# Patient Record
Sex: FEMALE | Race: WHITE | NOT HISPANIC OR LATINO | Employment: OTHER | URBAN - METROPOLITAN AREA
[De-identification: names, ages, dates, MRNs, and addresses within clinical notes are randomized per-mention and may not be internally consistent; named-entity substitution may affect disease eponyms.]

---

## 2017-07-20 ENCOUNTER — APPOINTMENT (OUTPATIENT)
Dept: GENERAL RADIOLOGY | Facility: HOSPITAL | Age: 82
End: 2017-07-20

## 2017-07-20 ENCOUNTER — HOSPITAL ENCOUNTER (EMERGENCY)
Facility: HOSPITAL | Age: 82
Discharge: HOME | End: 2017-07-20
Attending: EMERGENCY MEDICINE | Admitting: EMERGENCY MEDICINE

## 2017-07-20 ENCOUNTER — APPOINTMENT (OUTPATIENT)
Dept: CT IMAGING | Facility: HOSPITAL | Age: 82
End: 2017-07-20

## 2017-07-20 VITALS
SYSTOLIC BLOOD PRESSURE: 152 MMHG | HEIGHT: 65 IN | HEART RATE: 84 BPM | RESPIRATION RATE: 16 BRPM | BODY MASS INDEX: 16.33 KG/M2 | DIASTOLIC BLOOD PRESSURE: 98 MMHG | TEMPERATURE: 98.1 F | OXYGEN SATURATION: 94 % | WEIGHT: 98 LBS

## 2017-07-20 DIAGNOSIS — R07.9 CHEST PAIN, UNSPECIFIED TYPE: Primary | ICD-10-CM

## 2017-07-20 LAB
ALBUMIN SERPL-MCNC: 4.4 G/DL (ref 3.2–4.8)
ALBUMIN/GLOB SERPL: 1.4 G/DL (ref 1.5–2.5)
ALP SERPL-CCNC: 93 U/L (ref 25–100)
ALT SERPL W P-5'-P-CCNC: 10 U/L (ref 7–40)
ANION GAP SERPL CALCULATED.3IONS-SCNC: 7 MMOL/L (ref 3–11)
AST SERPL-CCNC: 19 U/L (ref 0–33)
BASOPHILS # BLD AUTO: 0.02 10*3/MM3 (ref 0–0.2)
BASOPHILS NFR BLD AUTO: 0.3 % (ref 0–1)
BILIRUB SERPL-MCNC: 0.8 MG/DL (ref 0.3–1.2)
BILIRUB UR QL STRIP: NEGATIVE
BUN BLD-MCNC: 16 MG/DL (ref 9–23)
BUN/CREAT SERPL: 22.9 (ref 7–25)
CALCIUM SPEC-SCNC: 10 MG/DL (ref 8.7–10.4)
CHLORIDE SERPL-SCNC: 101 MMOL/L (ref 99–109)
CLARITY UR: CLEAR
CO2 SERPL-SCNC: 29 MMOL/L (ref 20–31)
COLOR UR: YELLOW
CREAT BLD-MCNC: 0.7 MG/DL (ref 0.6–1.3)
D DIMER PPP FEU-MCNC: 1.1 MG/L (FEU) (ref 0–0.5)
DEPRECATED RDW RBC AUTO: 47.7 FL (ref 37–54)
EOSINOPHIL # BLD AUTO: 0.07 10*3/MM3 (ref 0–0.3)
EOSINOPHIL NFR BLD AUTO: 0.9 % (ref 0–3)
ERYTHROCYTE [DISTWIDTH] IN BLOOD BY AUTOMATED COUNT: 13.4 % (ref 11.3–14.5)
GFR SERPL CREATININE-BSD FRML MDRD: 79 ML/MIN/1.73
GLOBULIN UR ELPH-MCNC: 3.1 GM/DL
GLUCOSE BLD-MCNC: 106 MG/DL (ref 70–100)
GLUCOSE UR STRIP-MCNC: NEGATIVE MG/DL
HCT VFR BLD AUTO: 44.4 % (ref 34.5–44)
HGB BLD-MCNC: 14.5 G/DL (ref 11.5–15.5)
HGB UR QL STRIP.AUTO: NEGATIVE
HOLD SPECIMEN: NORMAL
HOLD SPECIMEN: NORMAL
IMM GRANULOCYTES # BLD: 0.02 10*3/MM3 (ref 0–0.03)
IMM GRANULOCYTES NFR BLD: 0.3 % (ref 0–0.6)
INR PPP: 0.96
KETONES UR QL STRIP: ABNORMAL
LEUKOCYTE ESTERASE UR QL STRIP.AUTO: NEGATIVE
LYMPHOCYTES # BLD AUTO: 1.04 10*3/MM3 (ref 0.6–4.8)
LYMPHOCYTES NFR BLD AUTO: 13.3 % (ref 24–44)
MCH RBC QN AUTO: 31.7 PG (ref 27–31)
MCHC RBC AUTO-ENTMCNC: 32.7 G/DL (ref 32–36)
MCV RBC AUTO: 96.9 FL (ref 80–99)
MONOCYTES # BLD AUTO: 0.37 10*3/MM3 (ref 0–1)
MONOCYTES NFR BLD AUTO: 4.7 % (ref 0–12)
NEUTROPHILS # BLD AUTO: 6.29 10*3/MM3 (ref 1.5–8.3)
NEUTROPHILS NFR BLD AUTO: 80.5 % (ref 41–71)
NITRITE UR QL STRIP: NEGATIVE
PH UR STRIP.AUTO: 7 [PH] (ref 5–8)
PLATELET # BLD AUTO: 208 10*3/MM3 (ref 150–450)
PMV BLD AUTO: 9.3 FL (ref 6–12)
POTASSIUM BLD-SCNC: 3.9 MMOL/L (ref 3.5–5.5)
PROT SERPL-MCNC: 7.5 G/DL (ref 5.7–8.2)
PROT UR QL STRIP: ABNORMAL
PROTHROMBIN TIME: 10.4 SECONDS (ref 9.6–11.5)
RBC # BLD AUTO: 4.58 10*6/MM3 (ref 3.89–5.14)
SODIUM BLD-SCNC: 137 MMOL/L (ref 132–146)
SP GR UR STRIP: 1.01 (ref 1–1.03)
TROPONIN I SERPL-MCNC: 0 NG/ML (ref 0–0.07)
TROPONIN I SERPL-MCNC: 0 NG/ML (ref 0–0.07)
UROBILINOGEN UR QL STRIP: ABNORMAL
WBC NRBC COR # BLD: 7.81 10*3/MM3 (ref 3.5–10.8)
WHOLE BLOOD HOLD SPECIMEN: NORMAL
WHOLE BLOOD HOLD SPECIMEN: NORMAL

## 2017-07-20 PROCEDURE — 0 IOPAMIDOL PER 1 ML: Performed by: EMERGENCY MEDICINE

## 2017-07-20 PROCEDURE — 80053 COMPREHEN METABOLIC PANEL: CPT | Performed by: EMERGENCY MEDICINE

## 2017-07-20 PROCEDURE — 81003 URINALYSIS AUTO W/O SCOPE: CPT | Performed by: EMERGENCY MEDICINE

## 2017-07-20 PROCEDURE — 93005 ELECTROCARDIOGRAM TRACING: CPT | Performed by: EMERGENCY MEDICINE

## 2017-07-20 PROCEDURE — 71010 HC CHEST PA OR AP: CPT

## 2017-07-20 PROCEDURE — 25010000002 HYDROMORPHONE PER 4 MG: Performed by: EMERGENCY MEDICINE

## 2017-07-20 PROCEDURE — 96375 TX/PRO/DX INJ NEW DRUG ADDON: CPT

## 2017-07-20 PROCEDURE — 25010000002 ONDANSETRON PER 1 MG: Performed by: EMERGENCY MEDICINE

## 2017-07-20 PROCEDURE — 25010000002 HYDROMORPHONE PER 4 MG

## 2017-07-20 PROCEDURE — 85379 FIBRIN DEGRADATION QUANT: CPT | Performed by: EMERGENCY MEDICINE

## 2017-07-20 PROCEDURE — 85610 PROTHROMBIN TIME: CPT | Performed by: EMERGENCY MEDICINE

## 2017-07-20 PROCEDURE — 96376 TX/PRO/DX INJ SAME DRUG ADON: CPT

## 2017-07-20 PROCEDURE — 71275 CT ANGIOGRAPHY CHEST: CPT

## 2017-07-20 PROCEDURE — 74176 CT ABD & PELVIS W/O CONTRAST: CPT

## 2017-07-20 PROCEDURE — 99285 EMERGENCY DEPT VISIT HI MDM: CPT

## 2017-07-20 PROCEDURE — 96374 THER/PROPH/DIAG INJ IV PUSH: CPT

## 2017-07-20 PROCEDURE — 84484 ASSAY OF TROPONIN QUANT: CPT

## 2017-07-20 PROCEDURE — 85025 COMPLETE CBC W/AUTO DIFF WBC: CPT | Performed by: EMERGENCY MEDICINE

## 2017-07-20 RX ORDER — TRAMADOL HYDROCHLORIDE 50 MG/1
50 TABLET ORAL 2 TIMES DAILY
Status: ON HOLD | COMMUNITY
End: 2019-08-27 | Stop reason: SDUPTHER

## 2017-07-20 RX ORDER — ONDANSETRON 2 MG/ML
4 INJECTION INTRAMUSCULAR; INTRAVENOUS ONCE
Status: DISCONTINUED | OUTPATIENT
Start: 2017-07-20 | End: 2017-07-20

## 2017-07-20 RX ORDER — TEMAZEPAM 15 MG/1
15 CAPSULE ORAL NIGHTLY PRN
Status: ON HOLD | COMMUNITY
End: 2019-08-27 | Stop reason: SDUPTHER

## 2017-07-20 RX ORDER — HYDROMORPHONE HYDROCHLORIDE 1 MG/ML
0.25 INJECTION, SOLUTION INTRAMUSCULAR; INTRAVENOUS; SUBCUTANEOUS ONCE
Status: COMPLETED | OUTPATIENT
Start: 2017-07-20 | End: 2017-07-20

## 2017-07-20 RX ORDER — ONDANSETRON 2 MG/ML
4 INJECTION INTRAMUSCULAR; INTRAVENOUS ONCE
Status: COMPLETED | OUTPATIENT
Start: 2017-07-20 | End: 2017-07-20

## 2017-07-20 RX ORDER — SODIUM CHLORIDE 0.9 % (FLUSH) 0.9 %
10 SYRINGE (ML) INJECTION AS NEEDED
Status: DISCONTINUED | OUTPATIENT
Start: 2017-07-20 | End: 2017-07-20 | Stop reason: HOSPADM

## 2017-07-20 RX ORDER — CYCLOBENZAPRINE HCL 10 MG
10 TABLET ORAL 3 TIMES DAILY PRN
Qty: 15 TABLET | Refills: 0 | Status: SHIPPED | OUTPATIENT
Start: 2017-07-20 | End: 2019-08-23

## 2017-07-20 RX ORDER — ASPIRIN 81 MG/1
324 TABLET, CHEWABLE ORAL ONCE
Status: COMPLETED | OUTPATIENT
Start: 2017-07-20 | End: 2017-07-20

## 2017-07-20 RX ORDER — MELOXICAM 7.5 MG/1
7.5 TABLET ORAL DAILY
COMMUNITY
End: 2019-08-23

## 2017-07-20 RX ORDER — LEVOTHYROXINE SODIUM 0.03 MG/1
25 TABLET ORAL DAILY
COMMUNITY
End: 2019-08-27 | Stop reason: HOSPADM

## 2017-07-20 RX ADMIN — HYDROMORPHONE HYDROCHLORIDE 0.25 MG: 1 INJECTION, SOLUTION INTRAMUSCULAR; INTRAVENOUS; SUBCUTANEOUS at 15:18

## 2017-07-20 RX ADMIN — ONDANSETRON 4 MG: 2 INJECTION INTRAMUSCULAR; INTRAVENOUS at 15:17

## 2017-07-20 RX ADMIN — ONDANSETRON 4 MG: 2 INJECTION INTRAMUSCULAR; INTRAVENOUS at 12:00

## 2017-07-20 RX ADMIN — IOPAMIDOL 65 ML: 755 INJECTION, SOLUTION INTRAVENOUS at 10:28

## 2017-07-20 RX ADMIN — HYDROMORPHONE HYDROCHLORIDE 0.25 MG: 1 INJECTION, SOLUTION INTRAMUSCULAR; INTRAVENOUS; SUBCUTANEOUS at 12:01

## 2017-07-20 RX ADMIN — ASPIRIN 81 MG CHEWABLE TABLET 324 MG: 81 TABLET CHEWABLE at 09:01

## 2017-07-20 NOTE — DISCHARGE INSTRUCTIONS
You may use 500 mg of Tylenol every 8 hours with your Flexeril as needed.  Do not use her temazepam at night for sleep if your drowsy.  Return if shortness of air, pain worsens, or problems sooner.

## 2017-07-20 NOTE — ED PROVIDER NOTES
Subjective   HPI Comments: 87-year-old white female complaining of left lower rib pain for the past 24 hours.  Patient states that it hurts if she takes a deep breath then and to some extent when she moves.  She denies any trauma, shortness of breath, vomiting, abdominal pain, or other complaints.    Patient is a 87 y.o. female presenting with chest pain.   History provided by:  Patient  Chest Pain   Pain location:  L chest  Pain quality: dull    Pain radiates to:  Does not radiate  Onset quality:  Gradual  Duration:  1 day  Timing:  Constant  Chronicity:  New  Context: breathing    Relieved by:  Nothing  Worsened by:  Movement and deep breathing  Ineffective treatments:  None tried  Associated symptoms: no abdominal pain, no back pain, no fever, no numbness and no palpitations        Review of Systems   Constitutional: Negative for fever.   Cardiovascular: Positive for chest pain. Negative for palpitations.   Gastrointestinal: Negative for abdominal pain.   Musculoskeletal: Negative for back pain.   Neurological: Negative for numbness.   All other systems reviewed and are negative.      Past Medical History:   Diagnosis Date   • Disease of thyroid gland    • TIA (transient ischemic attack)        Allergies   Allergen Reactions   • Sulfa Antibiotics Rash       Past Surgical History:   Procedure Laterality Date   • ANKLE SURGERY     • FRACTURE SURGERY     • HERNIA REPAIR         History reviewed. No pertinent family history.    Social History     Social History   • Marital status:      Spouse name: N/A   • Number of children: N/A   • Years of education: N/A     Social History Main Topics   • Smoking status: Never Smoker   • Smokeless tobacco: None   • Alcohol use No   • Drug use: No   • Sexual activity: Defer     Other Topics Concern   • None     Social History Narrative   • None           Objective   Physical Exam   Constitutional: She appears well-developed and well-nourished.   HENT:   Head: Normocephalic  and atraumatic.   Eyes: Conjunctivae are normal. Pupils are equal, round, and reactive to light.   Cardiovascular: Normal rate, regular rhythm and normal heart sounds.    No murmur heard.  Pulmonary/Chest: Effort normal and breath sounds normal. No respiratory distress. She has no wheezes.   Abdominal: Soft. Bowel sounds are normal. She exhibits no distension. There is no tenderness. There is no rebound and no guarding.   Musculoskeletal: Normal range of motion. She exhibits no edema.   Neurological: She is alert.   Skin: Skin is warm and dry. No rash noted.   Psychiatric: Her behavior is normal. Judgment and thought content normal.   Nursing note and vitals reviewed.      Procedures         ED Course  ED Course   Comment By Time   She with no other complaints concerns. JUDIE Levin 07/20 1422          Recent Results (from the past 24 hour(s))   Comprehensive Metabolic Panel    Collection Time: 07/20/17  9:02 AM   Result Value Ref Range    Glucose 106 (H) 70 - 100 mg/dL    BUN 16 9 - 23 mg/dL    Creatinine 0.70 0.60 - 1.30 mg/dL    Sodium 137 132 - 146 mmol/L    Potassium 3.9 3.5 - 5.5 mmol/L    Chloride 101 99 - 109 mmol/L    CO2 29.0 20.0 - 31.0 mmol/L    Calcium 10.0 8.7 - 10.4 mg/dL    Total Protein 7.5 5.7 - 8.2 g/dL    Albumin 4.40 3.20 - 4.80 g/dL    ALT (SGPT) 10 7 - 40 U/L    AST (SGOT) 19 0 - 33 U/L    Alkaline Phosphatase 93 25 - 100 U/L    Total Bilirubin 0.8 0.3 - 1.2 mg/dL    eGFR Non African Amer 79 >60 mL/min/1.73    Globulin 3.1 gm/dL    A/G Ratio 1.4 (L) 1.5 - 2.5 g/dL    BUN/Creatinine Ratio 22.9 7.0 - 25.0    Anion Gap 7.0 3.0 - 11.0 mmol/L   Protime-INR    Collection Time: 07/20/17  9:02 AM   Result Value Ref Range    Protime 10.4 9.6 - 11.5 Seconds    INR 0.96    D-dimer, Quantitative    Collection Time: 07/20/17  9:02 AM   Result Value Ref Range    D-Dimer, Quantitative 1.10 (H) 0.00 - 0.50 mg/L (FEU)   Light Blue Top    Collection Time: 07/20/17  9:02 AM   Result Value Ref Range    Extra  Tube hold for add-on    Green Top (Gel)    Collection Time: 07/20/17  9:02 AM   Result Value Ref Range    Extra Tube Hold for add-ons.    Lavender Top    Collection Time: 07/20/17  9:02 AM   Result Value Ref Range    Extra Tube hold for add-on    Gold Top - SST    Collection Time: 07/20/17  9:02 AM   Result Value Ref Range    Extra Tube Hold for add-ons.    CBC Auto Differential    Collection Time: 07/20/17  9:02 AM   Result Value Ref Range    WBC 7.81 3.50 - 10.80 10*3/mm3    RBC 4.58 3.89 - 5.14 10*6/mm3    Hemoglobin 14.5 11.5 - 15.5 g/dL    Hematocrit 44.4 (H) 34.5 - 44.0 %    MCV 96.9 80.0 - 99.0 fL    MCH 31.7 (H) 27.0 - 31.0 pg    MCHC 32.7 32.0 - 36.0 g/dL    RDW 13.4 11.3 - 14.5 %    RDW-SD 47.7 37.0 - 54.0 fl    MPV 9.3 6.0 - 12.0 fL    Platelets 208 150 - 450 10*3/mm3    Neutrophil % 80.5 (H) 41.0 - 71.0 %    Lymphocyte % 13.3 (L) 24.0 - 44.0 %    Monocyte % 4.7 0.0 - 12.0 %    Eosinophil % 0.9 0.0 - 3.0 %    Basophil % 0.3 0.0 - 1.0 %    Immature Grans % 0.3 0.0 - 0.6 %    Neutrophils, Absolute 6.29 1.50 - 8.30 10*3/mm3    Lymphocytes, Absolute 1.04 0.60 - 4.80 10*3/mm3    Monocytes, Absolute 0.37 0.00 - 1.00 10*3/mm3    Eosinophils, Absolute 0.07 0.00 - 0.30 10*3/mm3    Basophils, Absolute 0.02 0.00 - 0.20 10*3/mm3    Immature Grans, Absolute 0.02 0.00 - 0.03 10*3/mm3   POC Troponin, Rapid    Collection Time: 07/20/17  9:04 AM   Result Value Ref Range    Troponin I 0.00 0.00 - 0.07 ng/mL   Urinalysis With / Culture If Indicated    Collection Time: 07/20/17  9:41 AM   Result Value Ref Range    Color, UA Yellow Yellow, Straw    Appearance, UA Clear Clear    pH, UA 7.0 5.0 - 8.0    Specific Gravity, UA 1.014 1.001 - 1.030    Glucose, UA Negative Negative    Ketones, UA 15 mg/dL (1+) (A) Negative    Bilirubin, UA Negative Negative    Blood, UA Negative Negative    Protein, UA Trace (A) Negative    Leuk Esterase, UA Negative Negative    Nitrite, UA Negative Negative    Urobilinogen, UA 0.2 E.U./dL 0.2 - 1.0  E.U./dL   POC Troponin, Rapid    Collection Time: 07/20/17 12:17 PM   Result Value Ref Range    Troponin I 0.00 0.00 - 0.07 ng/mL     Note: In addition to lab results from this visit, the labs listed above may include labs taken at another facility or during a different encounter within the last 24 hours. Please correlate lab times with ED admission and discharge times for further clarification of the services performed during this visit.    CT Abdomen Pelvis Without Contrast   Preliminary Result   There are no acute findings.       D:  07/20/2017   E:  07/20/2017              XR Chest 1 View   Final Result   Chronic change; no acute disease.       D:  07/20/2017   E:  07/20/2017       This report was finalized on 7/20/2017 11:45 AM by Dr. Lester Patterson MD.          CT Angiogram Chest With & Without Contrast   Final Result   Negative CT angiogram of the chest.       D:  07/20/2017   E:  07/20/2017           This report was finalized on 7/20/2017 11:44 AM by Dr. Lester Patterson MD.            Vitals:    07/20/17 1200 07/20/17 1300 07/20/17 1307 07/20/17 1423   BP: (!) 169/109 165/100  152/98   BP Location:    Right arm   Patient Position:    Lying   Pulse: 103  82 84   Resp:       Temp:       SpO2: 96% 94% 95% 94%   Weight:       Height:         Medications   sodium chloride 0.9 % flush 10 mL (not administered)   aspirin chewable tablet 324 mg (324 mg Oral Given 7/20/17 0901)   iopamidol (ISOVUE-370) 76 % injection 100 mL (65 mL Intravenous Given 7/20/17 1028)   HYDROmorphone (DILAUDID) injection 0.25 mg (0.25 mg Intravenous Given 7/20/17 1201)   ondansetron (ZOFRAN) injection 4 mg (4 mg Intravenous Given 7/20/17 1200)     ECG/EMG Results (last 24 hours)     Procedure Component Value Units Date/Time    ECG 12 Lead [[id number]] Collected:  07/20/17 0839     Updated:  07/20/17 1241    Narrative:       Test Reason : upper abd pain  Blood Pressure : **/** mmHG  Vent. 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

## 2019-08-23 ENCOUNTER — HOSPITAL ENCOUNTER (INPATIENT)
Facility: HOSPITAL | Age: 84
LOS: 4 days | Discharge: NURSING, REHAB OR LONG-TERM CARE | End: 2019-08-27
Attending: EMERGENCY MEDICINE | Admitting: FAMILY MEDICINE

## 2019-08-23 ENCOUNTER — APPOINTMENT (OUTPATIENT)
Dept: GENERAL RADIOLOGY | Facility: HOSPITAL | Age: 84
End: 2019-08-23

## 2019-08-23 ENCOUNTER — APPOINTMENT (OUTPATIENT)
Dept: CARDIOLOGY | Facility: HOSPITAL | Age: 84
End: 2019-08-23

## 2019-08-23 ENCOUNTER — APPOINTMENT (OUTPATIENT)
Dept: CT IMAGING | Facility: HOSPITAL | Age: 84
End: 2019-08-23

## 2019-08-23 DIAGNOSIS — N39.0 URINARY TRACT INFECTION WITHOUT HEMATURIA, SITE UNSPECIFIED: ICD-10-CM

## 2019-08-23 DIAGNOSIS — Z78.9 IMPAIRED MOBILITY AND ADLS: ICD-10-CM

## 2019-08-23 DIAGNOSIS — I48.92 NEW ONSET ATRIAL FLUTTER (HCC): ICD-10-CM

## 2019-08-23 DIAGNOSIS — Z74.09 IMPAIRED MOBILITY AND ADLS: ICD-10-CM
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
## 2019-08-23 DIAGNOSIS — D72.829 LEUKOCYTOSIS, UNSPECIFIED TYPE: ICD-10-CM
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
## 2019-08-23 PROBLEM — M62.82 RHABDOMYOLYSIS: Status: ACTIVE | Noted: 2019-08-23

## 2019-08-23 PROBLEM — E03.9 HYPOTHYROIDISM (ACQUIRED): Status: ACTIVE | Noted: 2019-08-23

## 2019-08-23 LAB
ALBUMIN SERPL-MCNC: 4.7 G/DL (ref 3.5–5.2)
ALBUMIN/GLOB SERPL: 1.6 G/DL
ALP SERPL-CCNC: 82 U/L (ref 39–117)
ALT SERPL W P-5'-P-CCNC: 10 U/L (ref 1–33)
ANION GAP SERPL CALCULATED.3IONS-SCNC: 10 MMOL/L (ref 5–15)
AST SERPL-CCNC: 26 U/L (ref 1–32)
BACTERIA UR QL AUTO: ABNORMAL /HPF
BASOPHILS # BLD AUTO: 0.04 10*3/MM3 (ref 0–0.2)
BASOPHILS NFR BLD AUTO: 0.4 % (ref 0–1.5)
BH CV ECHO MEAS - AO ROOT AREA (BSA CORRECTED): 2.1
BH CV ECHO MEAS - AO ROOT AREA: 8 CM^2
BH CV ECHO MEAS - AO ROOT DIAM: 3.2 CM
BH CV ECHO MEAS - BSA(HAYCOCK): 1.5 M^2
BH CV ECHO MEAS - BSA: 1.5 M^2
BH CV ECHO MEAS - BZI_BMI: 20 KILOGRAMS/M^2
BH CV ECHO MEAS - BZI_METRIC_HEIGHT: 160 CM
BH CV ECHO MEAS - BZI_METRIC_WEIGHT: 51.3 KG
BH CV ECHO MEAS - EDV(CUBED): 38.3 ML
BH CV ECHO MEAS - EDV(TEICH): 46.5 ML
BH CV ECHO MEAS - EF(CUBED): 77.9 %
BH CV ECHO MEAS - EF(TEICH): 71.3 %
BH CV ECHO MEAS - ESV(CUBED): 8.5 ML
BH CV ECHO MEAS - ESV(TEICH): 13.3 ML
BH CV ECHO MEAS - FS: 39.6 %
BH CV ECHO MEAS - IVS/LVPW: 1
BH CV ECHO MEAS - IVSD: 1.1 CM
BH CV ECHO MEAS - LA DIMENSION: 2.6 CM
BH CV ECHO MEAS - LA/AO: 0.83
BH CV ECHO MEAS - LAD MAJOR: 5.2 CM
BH CV ECHO MEAS - LAT PEAK E' VEL: 5.8 CM/SEC
BH CV ECHO MEAS - LATERAL E/E' RATIO: 7.8
BH CV ECHO MEAS - LV MASS(C)D: 109.2 GRAMS
BH CV ECHO MEAS - LV MASS(C)DI: 72 GRAMS/M^2
BH CV ECHO MEAS - LVIDD: 3.4 CM
BH CV ECHO MEAS - LVIDS: 2 CM
BH CV ECHO MEAS - LVPWD: 1.1 CM
BH CV ECHO MEAS - MED PEAK E' VEL: 6.1 CM/SEC
BH CV ECHO MEAS - MEDIAL E/E' RATIO: 7.3
BH CV ECHO MEAS - MV A MAX VEL: 85.4 CM/SEC
BH CV ECHO MEAS - MV DEC SLOPE: 140.6 CM/SEC^2
BH CV ECHO MEAS - MV DEC TIME: 0.37 SEC
BH CV ECHO MEAS - MV E MAX VEL: 46.4 CM/SEC
BH CV ECHO MEAS - MV E/A: 0.54
BH CV ECHO MEAS - MV P1/2T MAX VEL: 57 CM/SEC
BH CV ECHO MEAS - MV P1/2T: 118.8 MSEC
BH CV ECHO MEAS - MVA P1/2T LCG: 3.9 CM^2
BH CV ECHO MEAS - MVA(P1/2T): 1.9 CM^2
BH CV ECHO MEAS - PA ACC SLOPE: 266.1 CM/SEC^2
BH CV ECHO MEAS - PA ACC TIME: 0.17 SEC
BH CV ECHO MEAS - PA PR(ACCEL): 1.4 MMHG
BH CV ECHO MEAS - RAP SYSTOLE: 3 MMHG
BH CV ECHO MEAS - RV MAX PG: 0.26 MMHG
BH CV ECHO MEAS - RV V1 MAX: 25.7 CM/SEC
BH CV ECHO MEAS - RVSP: 15 MMHG
BH CV ECHO MEAS - SI(CUBED): 19.7 ML/M^2
BH CV ECHO MEAS - SI(TEICH): 21.8 ML/M^2
BH CV ECHO MEAS - SV(CUBED): 29.9 ML
BH CV ECHO MEAS - SV(TEICH): 33.1 ML
BH CV ECHO MEAS - TR MAX PG: 12 MMHG
BH CV ECHO MEAS - TR MAX VEL: 175.1 CM/SEC
BH CV ECHO MEASUREMENTS AVERAGE E/E' RATIO: 7.8
BILIRUB SERPL-MCNC: 0.8 MG/DL (ref 0.2–1.2)
BILIRUB UR QL STRIP: NEGATIVE
BUN BLD-MCNC: 17 MG/DL (ref 8–23)
BUN/CREAT SERPL: 22.4 (ref 7–25)
CALCIUM SPEC-SCNC: 9.6 MG/DL (ref 8.6–10.5)
CHLORIDE SERPL-SCNC: 102 MMOL/L (ref 98–107)
CK SERPL-CCNC: 251 U/L (ref 20–180)
CLARITY UR: ABNORMAL
CO2 SERPL-SCNC: 28 MMOL/L (ref 22–29)
COLOR UR: YELLOW
CREAT BLD-MCNC: 0.76 MG/DL (ref 0.57–1)
DEPRECATED RDW RBC AUTO: 47.1 FL (ref 37–54)
EOSINOPHIL # BLD AUTO: 0 10*3/MM3 (ref 0–0.4)
EOSINOPHIL NFR BLD AUTO: 0 % (ref 0.3–6.2)
ERYTHROCYTE [DISTWIDTH] IN BLOOD BY AUTOMATED COUNT: 13.8 % (ref 12.3–15.4)
GFR SERPL CREATININE-BSD FRML MDRD: 72 ML/MIN/1.73
GLOBULIN UR ELPH-MCNC: 2.9 GM/DL
GLUCOSE BLD-MCNC: 122 MG/DL (ref 65–99)
GLUCOSE UR STRIP-MCNC: NEGATIVE MG/DL
HCT VFR BLD AUTO: 46.1 % (ref 34–46.6)
HGB BLD-MCNC: 14.7 G/DL (ref 12–15.9)
HGB UR QL STRIP.AUTO: ABNORMAL
HOLD SPECIMEN: NORMAL
HOLD SPECIMEN: NORMAL
HYALINE CASTS UR QL AUTO: ABNORMAL /LPF
IMM GRANULOCYTES # BLD AUTO: 0.06 10*3/MM3 (ref 0–0.05)
IMM GRANULOCYTES NFR BLD AUTO: 0.5 % (ref 0–0.5)
KETONES UR QL STRIP: NEGATIVE
LEFT ATRIUM VOLUME INDEX: 17.1 ML/M^2
LEFT ATRIUM VOLUME: 26 ML
LEUKOCYTE ESTERASE UR QL STRIP.AUTO: ABNORMAL
LYMPHOCYTES # BLD AUTO: 0.87 10*3/MM3 (ref 0.7–3.1)
LYMPHOCYTES NFR BLD AUTO: 7.9 % (ref 19.6–45.3)
MAGNESIUM SERPL-MCNC: 2.2 MG/DL (ref 1.6–2.4)
MAXIMAL PREDICTED HEART RATE: 131 BPM
MCH RBC QN AUTO: 29.4 PG (ref 26.6–33)
MCHC RBC AUTO-ENTMCNC: 31.9 G/DL (ref 31.5–35.7)
MCV RBC AUTO: 92.2 FL (ref 79–97)
MONOCYTES # BLD AUTO: 0.39 10*3/MM3 (ref 0.1–0.9)
MONOCYTES NFR BLD AUTO: 3.5 % (ref 5–12)
NEUTROPHILS # BLD AUTO: 9.72 10*3/MM3 (ref 1.7–7)
NEUTROPHILS NFR BLD AUTO: 87.7 % (ref 42.7–76)
NITRITE UR QL STRIP: NEGATIVE
NRBC BLD AUTO-RTO: 0 /100 WBC (ref 0–0.2)
NT-PROBNP SERPL-MCNC: 378.7 PG/ML (ref 5–1800)
PH UR STRIP.AUTO: 7.5 [PH] (ref 5–8)
PLATELET # BLD AUTO: 215 10*3/MM3 (ref 140–450)
PMV BLD AUTO: 9 FL (ref 6–12)
POTASSIUM BLD-SCNC: 4.2 MMOL/L (ref 3.5–5.2)
PROT SERPL-MCNC: 7.6 G/DL (ref 6–8.5)
PROT UR QL STRIP: NEGATIVE
RBC # BLD AUTO: 5 10*6/MM3 (ref 3.77–5.28)
RBC # UR: ABNORMAL /HPF
REF LAB TEST METHOD: ABNORMAL
SODIUM BLD-SCNC: 140 MMOL/L (ref 136–145)
SP GR UR STRIP: 1.01 (ref 1–1.03)
SQUAMOUS #/AREA URNS HPF: ABNORMAL /HPF
STRESS TARGET HR: 111 BPM
TROPONIN T SERPL-MCNC: <0.01 NG/ML (ref 0–0.03)
TSH SERPL DL<=0.05 MIU/L-ACNC: 5.11 MIU/ML (ref 0.27–4.2)
UROBILINOGEN UR QL STRIP: ABNORMAL
WBC NRBC COR # BLD: 11.08 10*3/MM3 (ref 3.4–10.8)
WBC UR QL AUTO: ABNORMAL /HPF
WHOLE BLOOD HOLD SPECIMEN: NORMAL
WHOLE BLOOD HOLD SPECIMEN: NORMAL

## 2019-08-23 PROCEDURE — 87077 CULTURE AEROBIC IDENTIFY: CPT | Performed by: EMERGENCY MEDICINE

## 2019-08-23 PROCEDURE — 87186 SC STD MICRODIL/AGAR DIL: CPT | Performed by: EMERGENCY MEDICINE

## 2019-08-23 PROCEDURE — 93306 TTE W/DOPPLER COMPLETE: CPT | Performed by: INTERNAL MEDICINE

## 2019-08-23 PROCEDURE — 70450 CT HEAD/BRAIN W/O DYE: CPT

## 2019-08-23 PROCEDURE — 73522 X-RAY EXAM HIPS BI 3-4 VIEWS: CPT

## 2019-08-23 PROCEDURE — 99285 EMERGENCY DEPT VISIT HI MDM: CPT

## 2019-08-23 PROCEDURE — 81001 URINALYSIS AUTO W/SCOPE: CPT | Performed by: EMERGENCY MEDICINE

## 2019-08-23 PROCEDURE — 87086 URINE CULTURE/COLONY COUNT: CPT | Performed by: EMERGENCY MEDICINE

## 2019-08-23 PROCEDURE — 25010000002 CEFTRIAXONE PER 250 MG: Performed by: EMERGENCY MEDICINE

## 2019-08-23 PROCEDURE — 74176 CT ABD & PELVIS W/O CONTRAST: CPT

## 2019-08-23 PROCEDURE — 72125 CT NECK SPINE W/O DYE: CPT

## 2019-08-23 PROCEDURE — 84443 ASSAY THYROID STIM HORMONE: CPT

## 2019-08-23 PROCEDURE — 99223 1ST HOSP IP/OBS HIGH 75: CPT | Performed by: HOSPITALIST

## 2019-08-23 PROCEDURE — 80053 COMPREHEN METABOLIC PANEL: CPT

## 2019-08-23 PROCEDURE — 93306 TTE W/DOPPLER COMPLETE: CPT

## 2019-08-23 PROCEDURE — 85025 COMPLETE CBC W/AUTO DIFF WBC: CPT

## 2019-08-23 PROCEDURE — 82550 ASSAY OF CK (CPK): CPT | Performed by: EMERGENCY MEDICINE

## 2019-08-23 PROCEDURE — 83735 ASSAY OF MAGNESIUM: CPT

## 2019-08-23 PROCEDURE — 83880 ASSAY OF NATRIURETIC PEPTIDE: CPT

## 2019-08-23 PROCEDURE — 70486 CT MAXILLOFACIAL W/O DYE: CPT

## 2019-08-23 PROCEDURE — 71045 X-RAY EXAM CHEST 1 VIEW: CPT

## 2019-08-23 PROCEDURE — 93005 ELECTROCARDIOGRAM TRACING: CPT | Performed by: EMERGENCY MEDICINE

## 2019-08-23 PROCEDURE — 84484 ASSAY OF TROPONIN QUANT: CPT

## 2019-08-23 RX ORDER — SODIUM CHLORIDE 0.9 % (FLUSH) 0.9 %
10 SYRINGE (ML) INJECTION AS NEEDED
Status: DISCONTINUED | OUTPATIENT
Start: 2019-08-23 | End: 2019-08-27 | Stop reason: HOSPADM

## 2019-08-23 RX ORDER — METOPROLOL TARTRATE 5 MG/5ML
2.5 INJECTION INTRAVENOUS
Status: DISCONTINUED | OUTPATIENT
Start: 2019-08-23 | End: 2019-08-23

## 2019-08-23 RX ORDER — ONDANSETRON 4 MG/1
4 TABLET, FILM COATED ORAL EVERY 6 HOURS PRN
Status: DISCONTINUED | OUTPATIENT
Start: 2019-08-23 | End: 2019-08-27 | Stop reason: HOSPADM

## 2019-08-23 RX ORDER — HYDROMORPHONE HYDROCHLORIDE 1 MG/ML
0.5 INJECTION, SOLUTION INTRAMUSCULAR; INTRAVENOUS; SUBCUTANEOUS
Status: DISCONTINUED | OUTPATIENT
Start: 2019-08-23 | End: 2019-08-27 | Stop reason: HOSPADM

## 2019-08-23 RX ORDER — SODIUM CHLORIDE 9 MG/ML
125 INJECTION, SOLUTION INTRAVENOUS CONTINUOUS
Status: DISCONTINUED | OUTPATIENT
Start: 2019-08-23 | End: 2019-08-23 | Stop reason: SDUPTHER

## 2019-08-23 RX ORDER — METOPROLOL TARTRATE 5 MG/5ML
5 INJECTION INTRAVENOUS EVERY 4 HOURS PRN
Status: DISCONTINUED | OUTPATIENT
Start: 2019-08-23 | End: 2019-08-27 | Stop reason: HOSPADM

## 2019-08-23 RX ORDER — SODIUM CHLORIDE, SODIUM LACTATE, POTASSIUM CHLORIDE, CALCIUM CHLORIDE 600; 310; 30; 20 MG/100ML; MG/100ML; MG/100ML; MG/100ML
100 INJECTION, SOLUTION INTRAVENOUS CONTINUOUS
Status: DISCONTINUED | OUTPATIENT
Start: 2019-08-23 | End: 2019-08-24

## 2019-08-23 RX ORDER — SACCHAROMYCES BOULARDII 250 MG
250 CAPSULE ORAL 2 TIMES DAILY
Status: DISCONTINUED | OUTPATIENT
Start: 2019-08-23 | End: 2019-08-27 | Stop reason: HOSPADM

## 2019-08-23 RX ORDER — TRAMADOL HYDROCHLORIDE 50 MG/1
50 TABLET ORAL EVERY 12 HOURS PRN
Status: DISCONTINUED | OUTPATIENT
Start: 2019-08-23 | End: 2019-08-27 | Stop reason: HOSPADM

## 2019-08-23 RX ORDER — LEVOTHYROXINE SODIUM 0.07 MG/1
37 TABLET ORAL
Status: DISCONTINUED | OUTPATIENT
Start: 2019-08-23 | End: 2019-08-27 | Stop reason: HOSPADM

## 2019-08-23 RX ORDER — ONDANSETRON 2 MG/ML
4 INJECTION INTRAMUSCULAR; INTRAVENOUS EVERY 6 HOURS PRN
Status: DISCONTINUED | OUTPATIENT
Start: 2019-08-23 | End: 2019-08-27 | Stop reason: HOSPADM

## 2019-08-23 RX ORDER — NALOXONE HCL 0.4 MG/ML
0.4 VIAL (ML) INJECTION
Status: DISCONTINUED | OUTPATIENT
Start: 2019-08-23 | End: 2019-08-27 | Stop reason: HOSPADM

## 2019-08-23 RX ORDER — SODIUM CHLORIDE 0.9 % (FLUSH) 0.9 %
3 SYRINGE (ML) INJECTION EVERY 12 HOURS SCHEDULED
Status: DISCONTINUED | OUTPATIENT
Start: 2019-08-23 | End: 2019-08-27 | Stop reason: HOSPADM

## 2019-08-23 RX ORDER — HYDROCODONE BITARTRATE AND ACETAMINOPHEN 5; 325 MG/1; MG/1
1 TABLET ORAL EVERY 4 HOURS PRN
Status: DISCONTINUED | OUTPATIENT
Start: 2019-08-23 | End: 2019-08-27 | Stop reason: HOSPADM

## 2019-08-23 RX ORDER — SODIUM CHLORIDE 0.9 % (FLUSH) 0.9 %
3-10 SYRINGE (ML) INJECTION AS NEEDED
Status: DISCONTINUED | OUTPATIENT
Start: 2019-08-23 | End: 2019-08-27 | Stop reason: HOSPADM

## 2019-08-23 RX ORDER — TEMAZEPAM 15 MG/1
15 CAPSULE ORAL NIGHTLY PRN
Status: DISCONTINUED | OUTPATIENT
Start: 2019-08-23 | End: 2019-08-27 | Stop reason: HOSPADM

## 2019-08-23 RX ADMIN — SODIUM CHLORIDE 125 ML/HR: 9 INJECTION, SOLUTION INTRAVENOUS at 09:49

## 2019-08-23 RX ADMIN — SODIUM CHLORIDE 1 G: 900 INJECTION INTRAVENOUS at 09:48

## 2019-08-23 RX ADMIN — SODIUM CHLORIDE 1000 ML: 9 INJECTION, SOLUTION INTRAVENOUS at 12:30

## 2019-08-23 RX ADMIN — LEVOTHYROXINE SODIUM 37.5 MCG: 75 TABLET ORAL at 14:54

## 2019-08-23 RX ADMIN — SODIUM CHLORIDE 500 ML: 9 INJECTION, SOLUTION INTRAVENOUS at 09:14

## 2019-08-23 RX ADMIN — SODIUM CHLORIDE, PRESERVATIVE FREE 3 ML: 5 INJECTION INTRAVENOUS at 21:09

## 2019-08-23 RX ADMIN — Medication 250 MG: at 21:08

## 2019-08-23 RX ADMIN — METOPROLOL TARTRATE 2.5 MG: 5 INJECTION INTRAVENOUS at 09:12

## 2019-08-23 RX ADMIN — HYDROCODONE BITARTRATE AND ACETAMINOPHEN 1 TABLET: 5; 325 TABLET ORAL at 15:00

## 2019-08-23 RX ADMIN — SODIUM CHLORIDE, POTASSIUM CHLORIDE, SODIUM LACTATE AND CALCIUM CHLORIDE 100 ML/HR: 600; 310; 30; 20 INJECTION, SOLUTION INTRAVENOUS at 17:29

## 2019-08-24 LAB
ALBUMIN SERPL-MCNC: 3.3 G/DL (ref 3.5–5.2)
ALBUMIN/GLOB SERPL: 1.3 G/DL
ALP SERPL-CCNC: 63 U/L (ref 39–117)
ALT SERPL W P-5'-P-CCNC: 8 U/L (ref 1–33)
ANION GAP SERPL CALCULATED.3IONS-SCNC: 9 MMOL/L (ref 5–15)
AST SERPL-CCNC: 20 U/L (ref 1–32)
BILIRUB SERPL-MCNC: 0.8 MG/DL (ref 0.2–1.2)
BUN BLD-MCNC: 13 MG/DL (ref 8–23)
BUN/CREAT SERPL: 19.1 (ref 7–25)
CALCIUM SPEC-SCNC: 8.4 MG/DL (ref 8.6–10.5)
CHLORIDE SERPL-SCNC: 109 MMOL/L (ref 98–107)
CK SERPL-CCNC: 177 U/L (ref 20–180)
CO2 SERPL-SCNC: 22 MMOL/L (ref 22–29)
CREAT BLD-MCNC: 0.68 MG/DL (ref 0.57–1)
DEPRECATED RDW RBC AUTO: 48.8 FL (ref 37–54)
ERYTHROCYTE [DISTWIDTH] IN BLOOD BY AUTOMATED COUNT: 14.3 % (ref 12.3–15.4)
GFR SERPL CREATININE-BSD FRML MDRD: 81 ML/MIN/1.73
GLOBULIN UR ELPH-MCNC: 2.5 GM/DL
GLUCOSE BLD-MCNC: 97 MG/DL (ref 65–99)
HBA1C MFR BLD: 5.2 % (ref 4.8–5.6)
HCT VFR BLD AUTO: 39.1 % (ref 34–46.6)
HGB BLD-MCNC: 12.4 G/DL (ref 12–15.9)
MCH RBC QN AUTO: 29.7 PG (ref 26.6–33)
MCHC RBC AUTO-ENTMCNC: 31.7 G/DL (ref 31.5–35.7)
MCV RBC AUTO: 93.5 FL (ref 79–97)
PLATELET # BLD AUTO: 193 10*3/MM3 (ref 140–450)
PMV BLD AUTO: 9.2 FL (ref 6–12)
POTASSIUM BLD-SCNC: 3.8 MMOL/L (ref 3.5–5.2)
PROT SERPL-MCNC: 5.8 G/DL (ref 6–8.5)
RBC # BLD AUTO: 4.18 10*6/MM3 (ref 3.77–5.28)
SODIUM BLD-SCNC: 140 MMOL/L (ref 136–145)
WBC NRBC COR # BLD: 7.55 10*3/MM3 (ref 3.4–10.8)

## 2019-08-24 PROCEDURE — 25010000002 HEPARIN (PORCINE) PER 1000 UNITS: Performed by: HOSPITALIST

## 2019-08-24 PROCEDURE — 83036 HEMOGLOBIN GLYCOSYLATED A1C: CPT | Performed by: HOSPITALIST

## 2019-08-24 PROCEDURE — 97166 OT EVAL MOD COMPLEX 45 MIN: CPT

## 2019-08-24 PROCEDURE — 82550 ASSAY OF CK (CPK): CPT | Performed by: HOSPITALIST

## 2019-08-24 PROCEDURE — 99232 SBSQ HOSP IP/OBS MODERATE 35: CPT | Performed by: HOSPITALIST

## 2019-08-24 PROCEDURE — 80053 COMPREHEN METABOLIC PANEL: CPT | Performed by: HOSPITALIST

## 2019-08-24 PROCEDURE — 85027 COMPLETE CBC AUTOMATED: CPT | Performed by: HOSPITALIST

## 2019-08-24 PROCEDURE — 25010000002 CEFTRIAXONE PER 250 MG: Performed by: HOSPITALIST

## 2019-08-24 PROCEDURE — 97161 PT EVAL LOW COMPLEX 20 MIN: CPT

## 2019-08-24 RX ORDER — HEPARIN SODIUM 5000 [USP'U]/ML
5000 INJECTION, SOLUTION INTRAVENOUS; SUBCUTANEOUS EVERY 8 HOURS SCHEDULED
Status: DISCONTINUED | OUTPATIENT
Start: 2019-08-24 | End: 2019-08-27 | Stop reason: HOSPADM

## 2019-08-24 RX ORDER — DOCUSATE SODIUM 100 MG/1
100 CAPSULE, LIQUID FILLED ORAL 2 TIMES DAILY
Status: DISCONTINUED | OUTPATIENT
Start: 2019-08-24 | End: 2019-08-27 | Stop reason: HOSPADM

## 2019-08-24 RX ORDER — AMLODIPINE BESYLATE 5 MG/1
5 TABLET ORAL
Status: DISCONTINUED | OUTPATIENT
Start: 2019-08-24 | End: 2019-08-25

## 2019-08-24 RX ADMIN — Medication 250 MG: at 21:37

## 2019-08-24 RX ADMIN — SODIUM CHLORIDE, PRESERVATIVE FREE 3 ML: 5 INJECTION INTRAVENOUS at 08:08

## 2019-08-24 RX ADMIN — SODIUM CHLORIDE, POTASSIUM CHLORIDE, SODIUM LACTATE AND CALCIUM CHLORIDE 100 ML/HR: 600; 310; 30; 20 INJECTION, SOLUTION INTRAVENOUS at 12:58

## 2019-08-24 RX ADMIN — HEPARIN SODIUM 5000 UNITS: 5000 INJECTION INTRAVENOUS; SUBCUTANEOUS at 21:37

## 2019-08-24 RX ADMIN — POLYETHYLENE GLYCOL 3350 17 G: 17 POWDER, FOR SOLUTION ORAL at 21:37

## 2019-08-24 RX ADMIN — DOCUSATE SODIUM 100 MG: 100 CAPSULE, LIQUID FILLED ORAL at 21:37

## 2019-08-24 RX ADMIN — LEVOTHYROXINE SODIUM 37.5 MCG: 75 TABLET ORAL at 05:26

## 2019-08-24 RX ADMIN — HYDROCODONE BITARTRATE AND ACETAMINOPHEN 1 TABLET: 5; 325 TABLET ORAL at 03:20

## 2019-08-24 RX ADMIN — Medication 250 MG: at 08:08

## 2019-08-24 RX ADMIN — HYDROCODONE BITARTRATE AND ACETAMINOPHEN 1 TABLET: 5; 325 TABLET ORAL at 21:37

## 2019-08-24 RX ADMIN — SODIUM CHLORIDE, POTASSIUM CHLORIDE, SODIUM LACTATE AND CALCIUM CHLORIDE 100 ML/HR: 600; 310; 30; 20 INJECTION, SOLUTION INTRAVENOUS at 03:01

## 2019-08-24 RX ADMIN — AMLODIPINE BESYLATE 5 MG: 5 TABLET ORAL at 16:53

## 2019-08-24 RX ADMIN — HYDROCODONE BITARTRATE AND ACETAMINOPHEN 1 TABLET: 5; 325 TABLET ORAL at 09:13

## 2019-08-24 RX ADMIN — CEFTRIAXONE 1 G: 1 INJECTION, POWDER, FOR SOLUTION INTRAMUSCULAR; INTRAVENOUS at 09:13

## 2019-08-25 ENCOUNTER — APPOINTMENT (OUTPATIENT)
Dept: GENERAL RADIOLOGY | Facility: HOSPITAL | Age: 84
End: 2019-08-25

## 2019-08-25 LAB — BACTERIA SPEC AEROBE CULT: ABNORMAL

## 2019-08-25 PROCEDURE — 99233 SBSQ HOSP IP/OBS HIGH 50: CPT | Performed by: FAMILY MEDICINE

## 2019-08-25 PROCEDURE — 25010000002 CEFTRIAXONE PER 250 MG: Performed by: HOSPITALIST

## 2019-08-25 PROCEDURE — 74018 RADEX ABDOMEN 1 VIEW: CPT

## 2019-08-25 PROCEDURE — 25010000002 HEPARIN (PORCINE) PER 1000 UNITS: Performed by: HOSPITALIST

## 2019-08-25 RX ORDER — SIMETHICONE 80 MG
80 TABLET,CHEWABLE ORAL 4 TIMES DAILY PRN
Status: DISCONTINUED | OUTPATIENT
Start: 2019-08-25 | End: 2019-08-27 | Stop reason: HOSPADM

## 2019-08-25 RX ORDER — BISACODYL 10 MG
10 SUPPOSITORY, RECTAL RECTAL DAILY
Status: DISCONTINUED | OUTPATIENT
Start: 2019-08-25 | End: 2019-08-27 | Stop reason: HOSPADM

## 2019-08-25 RX ORDER — AMLODIPINE BESYLATE 10 MG/1
10 TABLET ORAL
Status: DISCONTINUED | OUTPATIENT
Start: 2019-08-26 | End: 2019-08-27 | Stop reason: HOSPADM

## 2019-08-25 RX ORDER — AMLODIPINE BESYLATE 5 MG/1
5 TABLET ORAL ONCE
Status: COMPLETED | OUTPATIENT
Start: 2019-08-25 | End: 2019-08-25

## 2019-08-25 RX ADMIN — Medication 10 MG: at 12:08

## 2019-08-25 RX ADMIN — HEPARIN SODIUM 5000 UNITS: 5000 INJECTION INTRAVENOUS; SUBCUTANEOUS at 13:59

## 2019-08-25 RX ADMIN — SODIUM CHLORIDE, PRESERVATIVE FREE 3 ML: 5 INJECTION INTRAVENOUS at 21:46

## 2019-08-25 RX ADMIN — SODIUM CHLORIDE, PRESERVATIVE FREE 3 ML: 5 INJECTION INTRAVENOUS at 08:31

## 2019-08-25 RX ADMIN — DOCUSATE SODIUM 100 MG: 100 CAPSULE, LIQUID FILLED ORAL at 08:30

## 2019-08-25 RX ADMIN — LEVOTHYROXINE SODIUM 37.5 MCG: 75 TABLET ORAL at 06:23

## 2019-08-25 RX ADMIN — AMLODIPINE BESYLATE 5 MG: 5 TABLET ORAL at 08:30

## 2019-08-25 RX ADMIN — Medication 250 MG: at 21:45

## 2019-08-25 RX ADMIN — POLYETHYLENE GLYCOL 3350 17 G: 17 POWDER, FOR SOLUTION ORAL at 08:30

## 2019-08-25 RX ADMIN — HEPARIN SODIUM 5000 UNITS: 5000 INJECTION INTRAVENOUS; SUBCUTANEOUS at 21:45

## 2019-08-25 RX ADMIN — POLYETHYLENE GLYCOL 3350 17 G: 17 POWDER, FOR SOLUTION ORAL at 21:45

## 2019-08-25 RX ADMIN — DOCUSATE SODIUM 100 MG: 100 CAPSULE, LIQUID FILLED ORAL at 21:45

## 2019-08-25 RX ADMIN — CEFTRIAXONE 1 G: 1 INJECTION, POWDER, FOR SOLUTION INTRAMUSCULAR; INTRAVENOUS at 09:34

## 2019-08-25 RX ADMIN — SIMETHICONE CHEW TAB 80 MG 80 MG: 80 TABLET ORAL at 13:58

## 2019-08-25 RX ADMIN — HYDROCODONE BITARTRATE AND ACETAMINOPHEN 1 TABLET: 5; 325 TABLET ORAL at 14:12

## 2019-08-25 RX ADMIN — HYDROCODONE BITARTRATE AND ACETAMINOPHEN 1 TABLET: 5; 325 TABLET ORAL at 09:34

## 2019-08-25 RX ADMIN — HEPARIN SODIUM 5000 UNITS: 5000 INJECTION INTRAVENOUS; SUBCUTANEOUS at 06:23

## 2019-08-25 RX ADMIN — AMLODIPINE BESYLATE 5 MG: 5 TABLET ORAL at 12:08

## 2019-08-25 RX ADMIN — Medication 250 MG: at 08:30

## 2019-08-26 LAB
ANION GAP SERPL CALCULATED.3IONS-SCNC: 10 MMOL/L (ref 5–15)
BUN BLD-MCNC: 18 MG/DL (ref 8–23)
BUN/CREAT SERPL: 23.4 (ref 7–25)
CALCIUM SPEC-SCNC: 8.8 MG/DL (ref 8.6–10.5)
CHLORIDE SERPL-SCNC: 105 MMOL/L (ref 98–107)
CO2 SERPL-SCNC: 24 MMOL/L (ref 22–29)
CREAT BLD-MCNC: 0.77 MG/DL (ref 0.57–1)
DEPRECATED RDW RBC AUTO: 46.7 FL (ref 37–54)
ERYTHROCYTE [DISTWIDTH] IN BLOOD BY AUTOMATED COUNT: 13.8 % (ref 12.3–15.4)
GFR SERPL CREATININE-BSD FRML MDRD: 71 ML/MIN/1.73
GLUCOSE BLD-MCNC: 103 MG/DL (ref 65–99)
HCT VFR BLD AUTO: 46.7 % (ref 34–46.6)
HGB BLD-MCNC: 14.8 G/DL (ref 12–15.9)
MCH RBC QN AUTO: 29.5 PG (ref 26.6–33)
MCHC RBC AUTO-ENTMCNC: 31.7 G/DL (ref 31.5–35.7)
MCV RBC AUTO: 93 FL (ref 79–97)
PLATELET # BLD AUTO: 213 10*3/MM3 (ref 140–450)
PMV BLD AUTO: 9.3 FL (ref 6–12)
POTASSIUM BLD-SCNC: 3.8 MMOL/L (ref 3.5–5.2)
RBC # BLD AUTO: 5.02 10*6/MM3 (ref 3.77–5.28)
SODIUM BLD-SCNC: 139 MMOL/L (ref 136–145)
WBC NRBC COR # BLD: 7.54 10*3/MM3 (ref 3.4–10.8)

## 2019-08-26 PROCEDURE — 25010000002 HEPARIN (PORCINE) PER 1000 UNITS: Performed by: HOSPITALIST

## 2019-08-26 PROCEDURE — 97116 GAIT TRAINING THERAPY: CPT

## 2019-08-26 PROCEDURE — 25010000002 CEFTRIAXONE PER 250 MG: Performed by: HOSPITALIST

## 2019-08-26 PROCEDURE — 97535 SELF CARE MNGMENT TRAINING: CPT

## 2019-08-26 PROCEDURE — 85027 COMPLETE CBC AUTOMATED: CPT | Performed by: FAMILY MEDICINE

## 2019-08-26 PROCEDURE — 80048 BASIC METABOLIC PNL TOTAL CA: CPT | Performed by: FAMILY MEDICINE

## 2019-08-26 PROCEDURE — 97110 THERAPEUTIC EXERCISES: CPT

## 2019-08-26 PROCEDURE — 99231 SBSQ HOSP IP/OBS SF/LOW 25: CPT | Performed by: FAMILY MEDICINE

## 2019-08-26 RX ORDER — LISINOPRIL 10 MG/1
10 TABLET ORAL
Status: DISCONTINUED | OUTPATIENT
Start: 2019-08-26 | End: 2019-08-27 | Stop reason: HOSPADM

## 2019-08-26 RX ADMIN — LISINOPRIL 10 MG: 10 TABLET ORAL at 14:36

## 2019-08-26 RX ADMIN — HYDROCODONE BITARTRATE AND ACETAMINOPHEN 1 TABLET: 5; 325 TABLET ORAL at 18:16

## 2019-08-26 RX ADMIN — Medication 250 MG: at 21:59

## 2019-08-26 RX ADMIN — HEPARIN SODIUM 5000 UNITS: 5000 INJECTION INTRAVENOUS; SUBCUTANEOUS at 21:59

## 2019-08-26 RX ADMIN — AMLODIPINE BESYLATE 10 MG: 10 TABLET ORAL at 09:11

## 2019-08-26 RX ADMIN — HEPARIN SODIUM 5000 UNITS: 5000 INJECTION INTRAVENOUS; SUBCUTANEOUS at 14:36

## 2019-08-26 RX ADMIN — HYDROCODONE BITARTRATE AND ACETAMINOPHEN 1 TABLET: 5; 325 TABLET ORAL at 21:59

## 2019-08-26 RX ADMIN — POLYETHYLENE GLYCOL 3350 17 G: 17 POWDER, FOR SOLUTION ORAL at 09:11

## 2019-08-26 RX ADMIN — SODIUM CHLORIDE, PRESERVATIVE FREE 3 ML: 5 INJECTION INTRAVENOUS at 09:12

## 2019-08-26 RX ADMIN — Medication 10 MG: at 09:11

## 2019-08-26 RX ADMIN — HEPARIN SODIUM 5000 UNITS: 5000 INJECTION INTRAVENOUS; SUBCUTANEOUS at 04:01

## 2019-08-26 RX ADMIN — LEVOTHYROXINE SODIUM 37.5 MCG: 75 TABLET ORAL at 03:59

## 2019-08-26 RX ADMIN — Medication 250 MG: at 09:10

## 2019-08-26 RX ADMIN — HYDROCODONE BITARTRATE AND ACETAMINOPHEN 1 TABLET: 5; 325 TABLET ORAL at 03:59

## 2019-08-26 RX ADMIN — DOCUSATE SODIUM 100 MG: 100 CAPSULE, LIQUID FILLED ORAL at 21:59

## 2019-08-26 RX ADMIN — DOCUSATE SODIUM 100 MG: 100 CAPSULE, LIQUID FILLED ORAL at 09:10

## 2019-08-26 RX ADMIN — HYDROCODONE BITARTRATE AND ACETAMINOPHEN 1 TABLET: 5; 325 TABLET ORAL at 09:11

## 2019-08-26 RX ADMIN — CEFTRIAXONE 1 G: 1 INJECTION, POWDER, FOR SOLUTION INTRAMUSCULAR; INTRAVENOUS at 09:11

## 2019-08-26 RX ADMIN — POLYETHYLENE GLYCOL 3350 17 G: 17 POWDER, FOR SOLUTION ORAL at 21:59

## 2019-08-27 VITALS
RESPIRATION RATE: 16 BRPM | HEIGHT: 63 IN | BODY MASS INDEX: 20.02 KG/M2 | TEMPERATURE: 97.6 F | SYSTOLIC BLOOD PRESSURE: 137 MMHG | HEART RATE: 77 BPM | WEIGHT: 113 LBS | DIASTOLIC BLOOD PRESSURE: 85 MMHG | OXYGEN SATURATION: 90 %

## 2019-08-27 PROCEDURE — 25010000002 HEPARIN (PORCINE) PER 1000 UNITS: Performed by: HOSPITALIST

## 2019-08-27 PROCEDURE — 25010000002 CEFTRIAXONE PER 250 MG: Performed by: HOSPITALIST

## 2019-08-27 PROCEDURE — 97535 SELF CARE MNGMENT TRAINING: CPT | Performed by: OCCUPATIONAL THERAPIST

## 2019-08-27 PROCEDURE — 99239 HOSP IP/OBS DSCHRG MGMT >30: CPT | Performed by: NURSE PRACTITIONER

## 2019-08-27 RX ORDER — TRAMADOL HYDROCHLORIDE 50 MG/1
50 TABLET ORAL EVERY 8 HOURS PRN
Qty: 9 TABLET | Refills: 0 | Status: SHIPPED | OUTPATIENT
Start: 2019-08-27 | End: 2020-02-11 | Stop reason: HOSPADM

## 2019-08-27 RX ORDER — PSEUDOEPHEDRINE HCL 30 MG
100 TABLET ORAL 2 TIMES DAILY
Qty: 60 EACH | Refills: 0
Start: 2019-08-27 | End: 2020-02-05

## 2019-08-27 RX ORDER — HYDROCODONE BITARTRATE AND ACETAMINOPHEN 5; 325 MG/1; MG/1
1 TABLET ORAL EVERY 4 HOURS PRN
Qty: 18 TABLET | Refills: 0 | Status: SHIPPED | OUTPATIENT
Start: 2019-08-27 | End: 2019-09-02

## 2019-08-27 RX ORDER — CEFUROXIME AXETIL 500 MG/1
500 TABLET ORAL 2 TIMES DAILY
Qty: 4 TABLET | Refills: 0 | Status: SHIPPED | OUTPATIENT
Start: 2019-08-27 | End: 2019-08-29

## 2019-08-27 RX ORDER — LISINOPRIL 10 MG/1
10 TABLET ORAL
Qty: 30 TABLET | Refills: 0 | Status: SHIPPED | OUTPATIENT
Start: 2019-08-27 | End: 2020-02-05

## 2019-08-27 RX ORDER — AMLODIPINE BESYLATE 10 MG/1
10 TABLET ORAL
Qty: 30 TABLET | Refills: 0
Start: 2019-08-27 | End: 2020-02-05

## 2019-08-27 RX ORDER — SIMETHICONE 80 MG
80 TABLET,CHEWABLE ORAL 4 TIMES DAILY PRN
Qty: 120 TABLET | Refills: 0
Start: 2019-08-27 | End: 2020-02-05

## 2019-08-27 RX ORDER — LEVOTHYROXINE SODIUM 0.07 MG/1
37 TABLET ORAL DAILY
Qty: 30 TABLET | Refills: 0
Start: 2019-08-27 | End: 2020-02-11 | Stop reason: HOSPADM

## 2019-08-27 RX ORDER — SACCHAROMYCES BOULARDII 250 MG
250 CAPSULE ORAL 2 TIMES DAILY
Qty: 42 CAPSULE | Refills: 0
Start: 2019-08-27 | End: 2019-09-06

## 2019-08-27 RX ORDER — TEMAZEPAM 15 MG/1
15 CAPSULE ORAL NIGHTLY PRN
Qty: 3 CAPSULE | Refills: 0 | Status: SHIPPED | OUTPATIENT
Start: 2019-08-27 | End: 2020-02-11 | Stop reason: HOSPADM

## 2019-08-27 RX ORDER — BISACODYL 10 MG
10 SUPPOSITORY, RECTAL RECTAL DAILY
Qty: 30 SUPPOSITORY | Refills: 0
Start: 2019-08-27 | End: 2020-02-05

## 2019-08-27 RX ADMIN — LEVOTHYROXINE SODIUM 37.5 MCG: 75 TABLET ORAL at 04:37

## 2019-08-27 RX ADMIN — LISINOPRIL 10 MG: 10 TABLET ORAL at 10:36

## 2019-08-27 RX ADMIN — AMLODIPINE BESYLATE 10 MG: 10 TABLET ORAL at 10:35

## 2019-08-27 RX ADMIN — CEFTRIAXONE 1 G: 1 INJECTION, POWDER, FOR SOLUTION INTRAMUSCULAR; INTRAVENOUS at 10:35

## 2019-08-27 RX ADMIN — POLYETHYLENE GLYCOL 3350 17 G: 17 POWDER, FOR SOLUTION ORAL at 10:35

## 2019-08-27 RX ADMIN — HYDROCODONE BITARTRATE AND ACETAMINOPHEN 1 TABLET: 5; 325 TABLET ORAL at 10:34

## 2019-08-27 RX ADMIN — DOCUSATE SODIUM 100 MG: 100 CAPSULE, LIQUID FILLED ORAL at 10:36

## 2019-08-27 RX ADMIN — Medication 250 MG: at 10:35

## 2019-08-27 RX ADMIN — HEPARIN SODIUM 5000 UNITS: 5000 INJECTION INTRAVENOUS; SUBCUTANEOUS at 04:38

## 2019-08-27 RX ADMIN — TEMAZEPAM 15 MG: 15 CAPSULE ORAL at 00:23

## 2019-08-28 ENCOUNTER — READMISSION MANAGEMENT (OUTPATIENT)
Dept: CALL CENTER | Facility: HOSPITAL | Age: 84
End: 2019-08-28

## 2019-08-28 NOTE — OUTREACH NOTE
Prep Survey      Responses   Facility patient discharged from?  [facility]   Is patient eligible?  No   What are the reasons patient is not eligible?  [facility]   Does the patient have one of the following disease processes/diagnoses(primary or secondary)?  Other   Prep survey completed?  Yes          Cami Heller RN

## 2020-02-05 ENCOUNTER — APPOINTMENT (OUTPATIENT)
Dept: GENERAL RADIOLOGY | Facility: HOSPITAL | Age: 85
End: 2020-02-05

## 2020-02-05 ENCOUNTER — APPOINTMENT (OUTPATIENT)
Dept: CT IMAGING | Facility: HOSPITAL | Age: 85
End: 2020-02-05

## 2020-02-05 ENCOUNTER — HOSPITAL ENCOUNTER (INPATIENT)
Facility: HOSPITAL | Age: 85
LOS: 5 days | Discharge: NURSING, REHAB OR LONG-TERM CARE | End: 2020-02-11
Attending: EMERGENCY MEDICINE | Admitting: INTERNAL MEDICINE

## 2020-02-05 ENCOUNTER — APPOINTMENT (OUTPATIENT)
Dept: MRI IMAGING | Facility: HOSPITAL | Age: 85
End: 2020-02-05

## 2020-02-05 DIAGNOSIS — R53.1 GENERALIZED WEAKNESS: Primary | ICD-10-CM

## 2020-02-05 DIAGNOSIS — Z91.81 AT RISK FOR FALLS: ICD-10-CM

## 2020-02-05 DIAGNOSIS — R41.841 COGNITIVE COMMUNICATION DEFICIT: ICD-10-CM

## 2020-02-05 DIAGNOSIS — Z74.09 IMPAIRED MOBILITY AND ADLS: ICD-10-CM

## 2020-02-05 DIAGNOSIS — Z78.9 IMPAIRED MOBILITY AND ADLS: ICD-10-CM
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
## 2020-02-05 PROBLEM — N39.0 ACUTE UTI (URINARY TRACT INFECTION): Status: RESOLVED | Noted: 2019-08-23 | Resolved: 2020-02-05

## 2020-02-05 PROBLEM — M62.82 RHABDOMYOLYSIS: Status: RESOLVED | Noted: 2019-08-23 | Resolved: 2020-02-05
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
## 2020-02-05 PROBLEM — I63.9 CVA (CEREBRAL VASCULAR ACCIDENT) (HCC): Status: ACTIVE | Noted: 2020-02-05

## 2020-02-05 LAB
ALBUMIN SERPL-MCNC: 3.7 G/DL (ref 3.5–5.2)
ALBUMIN/GLOB SERPL: 1.2 G/DL
ALP SERPL-CCNC: 72 U/L (ref 39–117)
ALT SERPL W P-5'-P-CCNC: 8 U/L (ref 1–33)
ANION GAP SERPL CALCULATED.3IONS-SCNC: 10 MMOL/L (ref 5–15)
AST SERPL-CCNC: 17 U/L (ref 1–32)
BASOPHILS # BLD AUTO: 0.03 10*3/MM3 (ref 0–0.2)
BASOPHILS NFR BLD AUTO: 0.3 % (ref 0–1.5)
BILIRUB SERPL-MCNC: 0.6 MG/DL (ref 0.2–1.2)
BILIRUB UR QL STRIP: NEGATIVE
BUN BLD-MCNC: 17 MG/DL (ref 8–23)
BUN/CREAT SERPL: 20 (ref 7–25)
CALCIUM SPEC-SCNC: 9 MG/DL (ref 8.6–10.5)
CHLORIDE SERPL-SCNC: 104 MMOL/L (ref 98–107)
CLARITY UR: CLEAR
CO2 SERPL-SCNC: 26 MMOL/L (ref 22–29)
COLOR UR: YELLOW
CREAT BLD-MCNC: 0.85 MG/DL (ref 0.57–1)
D-LACTATE SERPL-SCNC: 0.9 MMOL/L (ref 0.5–2)
DEPRECATED RDW RBC AUTO: 44.9 FL (ref 37–54)
EOSINOPHIL # BLD AUTO: 0.23 10*3/MM3 (ref 0–0.4)
EOSINOPHIL NFR BLD AUTO: 2.5 % (ref 0.3–6.2)
ERYTHROCYTE [DISTWIDTH] IN BLOOD BY AUTOMATED COUNT: 12.9 % (ref 12.3–15.4)
GFR SERPL CREATININE-BSD FRML MDRD: 63 ML/MIN/1.73
GLOBULIN UR ELPH-MCNC: 3.2 GM/DL
GLUCOSE BLD-MCNC: 90 MG/DL (ref 65–99)
GLUCOSE BLDC GLUCOMTR-MCNC: 81 MG/DL (ref 70–130)
GLUCOSE UR STRIP-MCNC: NEGATIVE MG/DL
HCT VFR BLD AUTO: 42.6 % (ref 34–46.6)
HGB BLD-MCNC: 13.7 G/DL (ref 12–15.9)
HGB UR QL STRIP.AUTO: NEGATIVE
HOLD SPECIMEN: NORMAL
HOLD SPECIMEN: NORMAL
IMM GRANULOCYTES # BLD AUTO: 0.03 10*3/MM3 (ref 0–0.05)
IMM GRANULOCYTES NFR BLD AUTO: 0.3 % (ref 0–0.5)
KETONES UR QL STRIP: ABNORMAL
LEUKOCYTE ESTERASE UR QL STRIP.AUTO: NEGATIVE
LYMPHOCYTES # BLD AUTO: 1.76 10*3/MM3 (ref 0.7–3.1)
LYMPHOCYTES NFR BLD AUTO: 19 % (ref 19.6–45.3)
MAGNESIUM SERPL-MCNC: 2.3 MG/DL (ref 1.6–2.4)
MCH RBC QN AUTO: 30.4 PG (ref 26.6–33)
MCHC RBC AUTO-ENTMCNC: 32.2 G/DL (ref 31.5–35.7)
MCV RBC AUTO: 94.5 FL (ref 79–97)
MONOCYTES # BLD AUTO: 0.58 10*3/MM3 (ref 0.1–0.9)
MONOCYTES NFR BLD AUTO: 6.3 % (ref 5–12)
NEUTROPHILS # BLD AUTO: 6.63 10*3/MM3 (ref 1.7–7)
NEUTROPHILS NFR BLD AUTO: 71.6 % (ref 42.7–76)
NITRITE UR QL STRIP: NEGATIVE
NRBC BLD AUTO-RTO: 0 /100 WBC (ref 0–0.2)
NT-PROBNP SERPL-MCNC: 201.2 PG/ML (ref 5–1800)
PH UR STRIP.AUTO: 6 [PH] (ref 5–8)
PLAT MORPH BLD: NORMAL
PLATELET # BLD AUTO: 225 10*3/MM3 (ref 140–450)
PMV BLD AUTO: 9.2 FL (ref 6–12)
POTASSIUM BLD-SCNC: 4 MMOL/L (ref 3.5–5.2)
PROT SERPL-MCNC: 6.9 G/DL (ref 6–8.5)
PROT UR QL STRIP: NEGATIVE
RBC # BLD AUTO: 4.51 10*6/MM3 (ref 3.77–5.28)
RBC MORPH BLD: NORMAL
SODIUM BLD-SCNC: 140 MMOL/L (ref 136–145)
SP GR UR STRIP: 1.01 (ref 1–1.03)
TROPONIN T SERPL-MCNC: 0.02 NG/ML (ref 0–0.03)
UROBILINOGEN UR QL STRIP: ABNORMAL
WBC MORPH BLD: NORMAL
WBC NRBC COR # BLD: 9.26 10*3/MM3 (ref 3.4–10.8)
WHOLE BLOOD HOLD SPECIMEN: NORMAL
WHOLE BLOOD HOLD SPECIMEN: NORMAL

## 2020-02-05 PROCEDURE — G0378 HOSPITAL OBSERVATION PER HR: HCPCS

## 2020-02-05 PROCEDURE — 83605 ASSAY OF LACTIC ACID: CPT | Performed by: NURSE PRACTITIONER

## 2020-02-05 PROCEDURE — 70551 MRI BRAIN STEM W/O DYE: CPT

## 2020-02-05 PROCEDURE — 85025 COMPLETE CBC W/AUTO DIFF WBC: CPT | Performed by: EMERGENCY MEDICINE

## 2020-02-05 PROCEDURE — 93005 ELECTROCARDIOGRAM TRACING: CPT | Performed by: EMERGENCY MEDICINE

## 2020-02-05 PROCEDURE — 71045 X-RAY EXAM CHEST 1 VIEW: CPT

## 2020-02-05 PROCEDURE — 87040 BLOOD CULTURE FOR BACTERIA: CPT | Performed by: NURSE PRACTITIONER

## 2020-02-05 PROCEDURE — 99284 EMERGENCY DEPT VISIT MOD MDM: CPT

## 2020-02-05 PROCEDURE — 83880 ASSAY OF NATRIURETIC PEPTIDE: CPT | Performed by: NURSE PRACTITIONER

## 2020-02-05 PROCEDURE — 84484 ASSAY OF TROPONIN QUANT: CPT | Performed by: EMERGENCY MEDICINE

## 2020-02-05 PROCEDURE — 82962 GLUCOSE BLOOD TEST: CPT

## 2020-02-05 PROCEDURE — 80053 COMPREHEN METABOLIC PANEL: CPT | Performed by: EMERGENCY MEDICINE

## 2020-02-05 PROCEDURE — 85007 BL SMEAR W/DIFF WBC COUNT: CPT | Performed by: EMERGENCY MEDICINE

## 2020-02-05 PROCEDURE — 99223 1ST HOSP IP/OBS HIGH 75: CPT | Performed by: FAMILY MEDICINE

## 2020-02-05 PROCEDURE — 93005 ELECTROCARDIOGRAM TRACING: CPT

## 2020-02-05 PROCEDURE — 83735 ASSAY OF MAGNESIUM: CPT | Performed by: EMERGENCY MEDICINE

## 2020-02-05 PROCEDURE — 70450 CT HEAD/BRAIN W/O DYE: CPT

## 2020-02-05 PROCEDURE — 81003 URINALYSIS AUTO W/O SCOPE: CPT | Performed by: NURSE PRACTITIONER

## 2020-02-05 RX ORDER — ONDANSETRON 4 MG/1
4 TABLET, FILM COATED ORAL EVERY 6 HOURS PRN
Status: DISCONTINUED | OUTPATIENT
Start: 2020-02-05 | End: 2020-02-11 | Stop reason: HOSPADM

## 2020-02-05 RX ORDER — ACETAMINOPHEN 325 MG/1
650 TABLET ORAL EVERY 4 HOURS PRN
Status: DISCONTINUED | OUTPATIENT
Start: 2020-02-05 | End: 2020-02-11 | Stop reason: HOSPADM

## 2020-02-05 RX ORDER — ONDANSETRON 2 MG/ML
4 INJECTION INTRAMUSCULAR; INTRAVENOUS EVERY 6 HOURS PRN
Status: DISCONTINUED | OUTPATIENT
Start: 2020-02-05 | End: 2020-02-11 | Stop reason: HOSPADM

## 2020-02-05 RX ORDER — SODIUM CHLORIDE 0.9 % (FLUSH) 0.9 %
10 SYRINGE (ML) INJECTION AS NEEDED
Status: DISCONTINUED | OUTPATIENT
Start: 2020-02-05 | End: 2020-02-05

## 2020-02-05 RX ORDER — SODIUM CHLORIDE 9 MG/ML
100 INJECTION, SOLUTION INTRAVENOUS CONTINUOUS
Status: DISCONTINUED | OUTPATIENT
Start: 2020-02-05 | End: 2020-02-05

## 2020-02-05 RX ORDER — ATORVASTATIN CALCIUM 40 MG/1
80 TABLET, FILM COATED ORAL NIGHTLY
Status: DISCONTINUED | OUTPATIENT
Start: 2020-02-05 | End: 2020-02-11 | Stop reason: HOSPADM

## 2020-02-05 RX ORDER — SODIUM CHLORIDE 0.9 % (FLUSH) 0.9 %
10 SYRINGE (ML) INJECTION AS NEEDED
Status: DISCONTINUED | OUTPATIENT
Start: 2020-02-05 | End: 2020-02-11 | Stop reason: HOSPADM

## 2020-02-05 RX ORDER — LEVOTHYROXINE SODIUM 0.07 MG/1
37 TABLET ORAL
Status: DISCONTINUED | OUTPATIENT
Start: 2020-02-06 | End: 2020-02-11 | Stop reason: HOSPADM

## 2020-02-05 RX ORDER — SODIUM CHLORIDE 0.9 % (FLUSH) 0.9 %
10 SYRINGE (ML) INJECTION EVERY 12 HOURS SCHEDULED
Status: DISCONTINUED | OUTPATIENT
Start: 2020-02-05 | End: 2020-02-11 | Stop reason: HOSPADM

## 2020-02-05 RX ORDER — ACETAMINOPHEN 160 MG/5ML
650 SOLUTION ORAL EVERY 4 HOURS PRN
Status: DISCONTINUED | OUTPATIENT
Start: 2020-02-05 | End: 2020-02-11 | Stop reason: HOSPADM

## 2020-02-05 RX ORDER — ACETAMINOPHEN 650 MG/1
650 SUPPOSITORY RECTAL EVERY 4 HOURS PRN
Status: DISCONTINUED | OUTPATIENT
Start: 2020-02-05 | End: 2020-02-11 | Stop reason: HOSPADM

## 2020-02-05 RX ORDER — SODIUM CHLORIDE 0.9 % (FLUSH) 0.9 %
10 SYRINGE (ML) INJECTION EVERY 12 HOURS SCHEDULED
Status: DISCONTINUED | OUTPATIENT
Start: 2020-02-05 | End: 2020-02-05

## 2020-02-05 RX ORDER — CHOLECALCIFEROL (VITAMIN D3) 125 MCG
5 CAPSULE ORAL NIGHTLY PRN
Status: DISCONTINUED | OUTPATIENT
Start: 2020-02-05 | End: 2020-02-11 | Stop reason: HOSPADM

## 2020-02-05 RX ADMIN — ATORVASTATIN CALCIUM 80 MG: 40 TABLET, FILM COATED ORAL at 22:06

## 2020-02-05 RX ADMIN — SODIUM CHLORIDE 500 ML: 9 INJECTION, SOLUTION INTRAVENOUS at 15:28

## 2020-02-05 RX ADMIN — SODIUM CHLORIDE, PRESERVATIVE FREE 10 ML: 5 INJECTION INTRAVENOUS at 22:06

## 2020-02-05 NOTE — ED PROVIDER NOTES
Santa Thurman is a 89 year old female presenting to the ED today with complaints of weakness. Her daughter states 3 days ago the patient's grandson was visiting the patient and noticed slurred speech and increased weakness. Later that day, when the daughter arrived to the patient's house, she did not notice any slurred speech, however did notice the patient could not go from sitting to standing without assistance. At baseline the patient ambulates with a walker, however has no difficultly standing to get to her walker. Additionally, at baseline the patient resides independently and has no difficultly with ADLs. The daughter states she resides next door to the patient so she has been closely monitoring the patient since 3 days ago. She states the patient's weakness has persisted and continues to worsen. Additional she notes the patient has had labored breathing during the night. The patient has a history of TIA and the daughter states she is concerned the patient may have had another. Currently the patient denies any pain or recent falls. There are no other acute complaints at this time.      History provided by:  Patient and relative  Weakness - Generalized   Severity:  Moderate  Onset quality:  Sudden  Duration:  3 days  Timing:  Constant  Progression:  Worsening  Chronicity:  New  Associated symptoms: shortness of breath (labored breathing during at night)    Associated symptoms: no abdominal pain and no chest pain        Review of Systems   Respiratory: Positive for shortness of breath (labored breathing during at night).    Cardiovascular: Negative for chest pain.   Gastrointestinal: Negative for abdominal pain.   Musculoskeletal: Positive for gait problem (secondary to weakness). Negative for back pain and neck pain.   Neurological: Positive for speech difficulty (resolved) and weakness.   All other systems reviewed and are negative.      Past Medical History:   Diagnosis Date   • Acute UTI  (urinary tract infection) 8/23/2019   • Disease of thyroid gland    • Rhabdomyolysis 8/23/2019   • TIA (transient ischemic attack)        Allergies   Allergen Reactions   • Aspirin Rash   • Sulfa Antibiotics Rash       Past Surgical History:   Procedure Laterality Date   • ANKLE SURGERY     • BACK SURGERY     • FRACTURE SURGERY     • HERNIA REPAIR         Family History   Problem Relation Age of Onset   • No Known Problems Mother    • No Known Problems Father        Social History     Socioeconomic History   • Marital status:      Spouse name: Not on file   • Number of children: Not on file   • Years of education: Not on file   • Highest education level: Not on file   Tobacco Use   • Smoking status: Never Smoker   • Smokeless tobacco: Never Used   Substance and Sexual Activity   • Alcohol use: No   • Drug use: No   • Sexual activity: Defer   Social History Narrative    Pt lives alone. Uses a walker to ambulate. Requires assistance w/ showering and bathing. Otherwise, pt independent w/ ADLs. Pt's son and daughter-in-law live next door and are very involved in her care.          Objective   Physical Exam   Constitutional: She is oriented to person, place, and time. She appears well-developed and well-nourished. No distress.   Elderly patient who appears thin and lightweight. Normal vital signs with exception of mild hypoxia, 92% on room air. She is a good historian, however her daughter-in-law is a better historian.   HENT:   Head: Normocephalic and atraumatic.   Mouth/Throat: Mucous membranes are dry.   Eyes: Conjunctivae are normal. No scleral icterus.   Neck: Normal range of motion. Neck supple. No JVD present.   Cardiovascular: Normal rate, regular rhythm and normal heart sounds.   No murmur heard.  Pulmonary/Chest: Effort normal. No respiratory distress. She has rales.   Rales in the right base.   Abdominal: Soft. There is no tenderness.   Musculoskeletal: Normal range of motion.   Limbs are atraumatic and  unremarkable. No peripheral edema.    Neurological: She is alert and oriented to person, place, and time.   No neurosensory deficits or focal weakness.   Skin: Skin is warm and dry. No rash noted. There is pallor.   Psychiatric: She has a normal mood and affect. Her behavior is normal.   Nursing note and vitals reviewed.      Procedures         ED Course  ED Course as of Feb 05 2038   Wed Feb 05, 2020   1631 ZEFERINO Toussaint, is at bedside updating patient and her daughter on results and plan.     [RP]   1642 Paged hospitalist. -MBS    [RP]   1649 Spoke to Dr. Walker, hospitalist, who will admit the patient to telemetry. -KARISHMA    [RP]   1651 Patient's work-up is reassuring.  Patient has had stable vital signs throughout her ED course.  Specific focus for the patient's acute change in strength is not recognized on her work-up today.  CT of the head is negative.  I spoken to Dr. aWlker who agrees to admit the patient for observation and generalized weakness.  MRI of the brain is now pending.    [MS]   1653 The daughter-in-law represents the patient's family expressing their sincere concern that the patient is at risk for falls at her current level of care and supervision and, without rehabilitation to potentially help her restore her baseline level of functioning, a new level of care will be required.    [MS]      ED Course User Index  [MS] Coty Smith APRN  [RP] Cara Zamudio     Recent Results (from the past 24 hour(s))   Comprehensive Metabolic Panel    Collection Time: 02/05/20  2:03 PM   Result Value Ref Range    Glucose 90 65 - 99 mg/dL    BUN 17 8 - 23 mg/dL    Creatinine 0.85 0.57 - 1.00 mg/dL    Sodium 140 136 - 145 mmol/L    Potassium 4.0 3.5 - 5.2 mmol/L    Chloride 104 98 - 107 mmol/L    CO2 26.0 22.0 - 29.0 mmol/L    Calcium 9.0 8.6 - 10.5 mg/dL    Total Protein 6.9 6.0 - 8.5 g/dL    Albumin 3.70 3.50 - 5.20 g/dL    ALT (SGPT) 8 1 - 33 U/L    AST (SGOT) 17 1 - 32 U/L    Alkaline Phosphatase  72 39 - 117 U/L    Total Bilirubin 0.6 0.2 - 1.2 mg/dL    eGFR Non African Amer 63 >60 mL/min/1.73    Globulin 3.2 gm/dL    A/G Ratio 1.2 g/dL    BUN/Creatinine Ratio 20.0 7.0 - 25.0    Anion Gap 10.0 5.0 - 15.0 mmol/L   Troponin    Collection Time: 02/05/20  2:03 PM   Result Value Ref Range    Troponin T 0.020 0.000 - 0.030 ng/mL   Magnesium    Collection Time: 02/05/20  2:03 PM   Result Value Ref Range    Magnesium 2.3 1.6 - 2.4 mg/dL   Light Blue Top    Collection Time: 02/05/20  2:03 PM   Result Value Ref Range    Extra Tube hold for add-on    Green Top (Gel)    Collection Time: 02/05/20  2:03 PM   Result Value Ref Range    Extra Tube Hold for add-ons.    Lavender Top    Collection Time: 02/05/20  2:03 PM   Result Value Ref Range    Extra Tube hold for add-on    Gold Top - SST    Collection Time: 02/05/20  2:03 PM   Result Value Ref Range    Extra Tube Hold for add-ons.    CBC Auto Differential    Collection Time: 02/05/20  2:03 PM   Result Value Ref Range    WBC 9.26 3.40 - 10.80 10*3/mm3    RBC 4.51 3.77 - 5.28 10*6/mm3    Hemoglobin 13.7 12.0 - 15.9 g/dL    Hematocrit 42.6 34.0 - 46.6 %    MCV 94.5 79.0 - 97.0 fL    MCH 30.4 26.6 - 33.0 pg    MCHC 32.2 31.5 - 35.7 g/dL    RDW 12.9 12.3 - 15.4 %    RDW-SD 44.9 37.0 - 54.0 fl    MPV 9.2 6.0 - 12.0 fL    Platelets 225 140 - 450 10*3/mm3    Neutrophil % 71.6 42.7 - 76.0 %    Lymphocyte % 19.0 (L) 19.6 - 45.3 %    Monocyte % 6.3 5.0 - 12.0 %    Eosinophil % 2.5 0.3 - 6.2 %    Basophil % 0.3 0.0 - 1.5 %    Immature Grans % 0.3 0.0 - 0.5 %    Neutrophils, Absolute 6.63 1.70 - 7.00 10*3/mm3    Lymphocytes, Absolute 1.76 0.70 - 3.10 10*3/mm3    Monocytes, Absolute 0.58 0.10 - 0.90 10*3/mm3    Eosinophils, Absolute 0.23 0.00 - 0.40 10*3/mm3    Basophils, Absolute 0.03 0.00 - 0.20 10*3/mm3    Immature Grans, Absolute 0.03 0.00 - 0.05 10*3/mm3    nRBC 0.0 0.0 - 0.2 /100 WBC   BNP    Collection Time: 02/05/20  2:03 PM   Result Value Ref Range    proBNP 201.2 5.0-1,800.0  pg/mL   Scan Slide    Collection Time: 02/05/20  2:03 PM   Result Value Ref Range    RBC Morphology Normal Normal    WBC Morphology Normal Normal    Platelet Morphology Normal Normal   Lactic Acid, Plasma    Collection Time: 02/05/20  3:12 PM   Result Value Ref Range    Lactate 0.9 0.5 - 2.0 mmol/L   Urinalysis With Microscopic If Indicated (No Culture) - Urine, Catheter    Collection Time: 02/05/20  3:29 PM   Result Value Ref Range    Color, UA Yellow Yellow, Straw    Appearance, UA Clear Clear    pH, UA 6.0 5.0 - 8.0    Specific Gravity, UA 1.015 1.001 - 1.030    Glucose, UA Negative Negative    Ketones, UA Trace (A) Negative    Bilirubin, UA Negative Negative    Blood, UA Negative Negative    Protein, UA Negative Negative    Leuk Esterase, UA Negative Negative    Nitrite, UA Negative Negative    Urobilinogen, UA 1.0 E.U./dL 0.2 - 1.0 E.U./dL   POC Glucose Once    Collection Time: 02/05/20  6:12 PM   Result Value Ref Range    Glucose 81 70 - 130 mg/dL     Note: In addition to lab results from this visit, the labs listed above may include labs taken at another facility or during a different encounter within the last 24 hours. Please correlate lab times with ED admission and discharge times for further clarification of the services performed during this visit.    MRI Brain Without Contrast   Final Result      1. There are 2 very small areas of recent infarct involving the bilateral centrum semiovale. These are likely tiny recent small vessel insults superimposed upon a background of extensive pre-existing small vessel disease. There is no evidence for mass   effect or hemorrhagic transformation.      Signer Name: Aliza Zarate MD    Signed: 2/5/2020 7:39 PM    Workstation Name: [email or web address]     [facility]      CT Head Without Contrast   Preliminary Result   Chronic age-related changes of the brain without evidence   for acute abnormality appreciated.       DICTATED:   02/05/2020   EDITED/ls :    "02/05/2020           XR Chest 1 View   Preliminary Result   Chronic appearing changes of the chest without convincing   evidence for active airspace disease.       D:  02/05/2020   E:  02/05/2020                Vitals:    02/05/20 1700 02/05/20 1746 02/05/20 1747 02/05/20 1956   BP: (!) 151/106 (!) 175/112 (!) 162/104 149/93   BP Location:   Right arm Right arm   Patient Position:   Lying Lying   Pulse: 86 91 90 74   Resp:   18 16   Temp:   97.9 °F (36.6 °C) 98 °F (36.7 °C)   TempSrc:   Oral Oral   SpO2: 91% 90% 90% 95%   Weight:   44.9 kg (98 lb 14.4 oz)    Height:   160 cm (63\")      Medications   acetaminophen (TYLENOL) tablet 650 mg (has no administration in time range)     Or   acetaminophen (TYLENOL) 160 MG/5ML solution 650 mg (has no administration in time range)     Or   acetaminophen (TYLENOL) suppository 650 mg (has no administration in time range)   ondansetron (ZOFRAN) tablet 4 mg (has no administration in time range)     Or   ondansetron (ZOFRAN) injection 4 mg (has no administration in time range)   levothyroxine (SYNTHROID, LEVOTHROID) tablet 37.5 mcg (has no administration in time range)   sodium chloride 0.9 % flush 10 mL (has no administration in time range)   sodium chloride 0.9 % flush 10 mL (has no administration in time range)   atorvastatin (LIPITOR) tablet 80 mg (has no administration in time range)   sodium chloride 0.9 % bolus 500 mL (0 mL Intravenous Stopped 2/5/20 1600)     ECG/EMG Results (last 24 hours)     Procedure Component Value Units Date/Time    ECG 12 Lead [[id number]] Collected:  02/05/20 1341     Updated:  02/05/20 1513        ECG 12 Lead                                                        MDM    Final diagnoses:   Generalized weakness   At risk for falls       Documentation assistance provided by leah Zamudio.  Information recorded by the scribe was done at my direction and has been verified and validated by me.     Cara Zamudio  02/05/20 1548       Coty Smith " Barbara, ZEFERINO  02/05/20 1657       Coty Smith, ZEFERINO  02/05/20 9850

## 2020-02-05 NOTE — PROGRESS NOTES
Discharge Planning Assessment  [facility]     Patient Name: Amber Thurman  MRN: [id number]  Today's Date: 2/5/2020    Admit Date: 2/5/2020    Discharge Needs Assessment     Row Name 02/05/20 1741       Living Environment    Lives With  alone    Current Living Arrangements  home/apartment/condo    Primary Care Provided by  self;child(patricia)    Provides Primary Care For  no one    Family Caregiver if Needed  child(patricia), adult    Family Caregiver Names  son    Quality of Family Relationships  helpful;involved;supportive    Able to Return to Prior Arrangements  yes       Resource/Environmental Concerns    Resource/Environmental Concerns  none    Transportation Concerns  car, none       Transition Planning    Patient/Family Anticipates Transition to  home with family    Patient/Family Anticipated Services at Transition  none    Transportation Anticipated  family or friend will provide       Discharge Needs Assessment    Readmission Within the Last 30 Days  no previous admission in last 30 days    Concerns to be Addressed  denies needs/concerns at this time    Equipment Currently Used at Home  none    Anticipated Changes Related to Illness  none    Provided post acute provider list?  N/A    N/A Provider List Comment  plan is home        Discharge Plan     Row Name 02/05/20 2007       Plan    Plan  initial    Plan Comments  Pt lives in [facility] in her own home. She cares for herself but her son and daughter-in-law assist with needs she doesn't perform herself. She wants to return home with her son at d/c. PCP is Dinh Alan    Final Discharge Disposition Code  01 - home or self-care        Destination      Coordination has not been started for this encounter.      Durable Medical Equipment      Coordination has not been started for this encounter.      Dialysis/Infusion      Coordination has not been started for this encounter.      Home Medical Care      Coordination has not been started for this encounter.       Therapy      Coordination has not been started for this encounter.      Community Resources      Coordination has not been started for this encounter.          Demographic Summary    No documentation.       Functional Status     Row Name 02/05/20 1744       Functional Status    Usual Activity Tolerance  moderate    Current Activity Tolerance  moderate       Functional Status, IADL    Medications  assistive person    Meal Preparation  assistive person    Housekeeping  assistive person    Laundry  assistive person    Shopping  assistive person    IADL Comments  Son and daughter-in-law provide assistance for all her needs       Mental Status    General Appearance WDL  WDL       Mental Status Summary    Recent Changes in Mental Status/Cognitive Functioning  no changes       Employment/    Employment Status  retired        Psychosocial    No documentation.       Abuse/Neglect    No documentation.       Legal    No documentation.       Substance Abuse    No documentation.       Patient Forms    No documentation.           Raquel Webster RN

## 2020-02-06 ENCOUNTER — APPOINTMENT (OUTPATIENT)
Dept: CARDIOLOGY | Facility: HOSPITAL | Age: 85
End: 2020-02-06

## 2020-02-06 LAB
ANION GAP SERPL CALCULATED.3IONS-SCNC: 10 MMOL/L (ref 5–15)
BH CV XLRA MEAS LEFT CCA RATIO VEL: 54.8 CM/SEC
BH CV XLRA MEAS LEFT DIST CCA EDV: 10.5 CM/SEC
BH CV XLRA MEAS LEFT DIST CCA PSV: 58 CM/SEC
BH CV XLRA MEAS LEFT DIST ICA EDV: 12 CM/SEC
BH CV XLRA MEAS LEFT DIST ICA PSV: 40.4 CM/SEC
BH CV XLRA MEAS LEFT ICA RATIO VEL: 51.5 CM/SEC
BH CV XLRA MEAS LEFT ICA/CCA RATIO: 0.94
BH CV XLRA MEAS LEFT MID CCA EDV: 10.8 CM/SEC
BH CV XLRA MEAS LEFT MID CCA PSV: 55.2 CM/SEC
BH CV XLRA MEAS LEFT MID ICA EDV: 15.1 CM/SEC
BH CV XLRA MEAS LEFT MID ICA PSV: 51.8 CM/SEC
BH CV XLRA MEAS LEFT PROX CCA EDV: 11.5 CM/SEC
BH CV XLRA MEAS LEFT PROX CCA PSV: 55.2 CM/SEC
BH CV XLRA MEAS LEFT PROX ECA EDV: 4.6 CM/SEC
BH CV XLRA MEAS LEFT PROX ECA PSV: 40.8 CM/SEC
BH CV XLRA MEAS LEFT PROX ICA EDV: 7.1 CM/SEC
BH CV XLRA MEAS LEFT PROX ICA PSV: 24.2 CM/SEC
BH CV XLRA MEAS LEFT PROX SCLA PSV: 68.1 CM/SEC
BH CV XLRA MEAS LEFT VERTEBRAL A EDV: 10.8 CM/SEC
BH CV XLRA MEAS LEFT VERTEBRAL A PSV: 40 CM/SEC
BH CV XLRA MEAS RIGHT CCA RATIO VEL: 61.1 CM/SEC
BH CV XLRA MEAS RIGHT DIST CCA EDV: 8.8 CM/SEC
BH CV XLRA MEAS RIGHT DIST CCA PSV: 35.7 CM/SEC
BH CV XLRA MEAS RIGHT DIST ICA EDV: 13.4 CM/SEC
BH CV XLRA MEAS RIGHT DIST ICA PSV: 43.6 CM/SEC
BH CV XLRA MEAS RIGHT ICA RATIO VEL: 40.7 CM/SEC
BH CV XLRA MEAS RIGHT ICA/CCA RATIO: 0.67
BH CV XLRA MEAS RIGHT MID CCA EDV: 10.8 CM/SEC
BH CV XLRA MEAS RIGHT MID CCA PSV: 61.5 CM/SEC
BH CV XLRA MEAS RIGHT MID ICA EDV: 13.6 CM/SEC
BH CV XLRA MEAS RIGHT MID ICA PSV: 40.9 CM/SEC
BH CV XLRA MEAS RIGHT PROX CCA EDV: 10.8 CM/SEC
BH CV XLRA MEAS RIGHT PROX CCA PSV: 67.4 CM/SEC
BH CV XLRA MEAS RIGHT PROX ECA EDV: 11.2 CM/SEC
BH CV XLRA MEAS RIGHT PROX ECA PSV: 44.3 CM/SEC
BH CV XLRA MEAS RIGHT PROX ICA EDV: 8.1 CM/SEC
BH CV XLRA MEAS RIGHT PROX ICA PSV: 30.1 CM/SEC
BH CV XLRA MEAS RIGHT PROX SCLA PSV: 59.8 CM/SEC
BH CV XLRA MEAS RIGHT VERTEBRAL A EDV: 10.6 CM/SEC
BH CV XLRA MEAS RIGHT VERTEBRAL A PSV: 30.1 CM/SEC
BUN BLD-MCNC: 18 MG/DL (ref 8–23)
BUN/CREAT SERPL: 20.2 (ref 7–25)
CALCIUM SPEC-SCNC: 8.7 MG/DL (ref 8.6–10.5)
CHLORIDE SERPL-SCNC: 108 MMOL/L (ref 98–107)
CHOLEST SERPL-MCNC: 123 MG/DL (ref 0–200)
CO2 SERPL-SCNC: 22 MMOL/L (ref 22–29)
CREAT BLD-MCNC: 0.89 MG/DL (ref 0.57–1)
DEPRECATED RDW RBC AUTO: 45.2 FL (ref 37–54)
ERYTHROCYTE [DISTWIDTH] IN BLOOD BY AUTOMATED COUNT: 12.9 % (ref 12.3–15.4)
GFR SERPL CREATININE-BSD FRML MDRD: 60 ML/MIN/1.73
GLUCOSE BLD-MCNC: 112 MG/DL (ref 65–99)
GLUCOSE BLDC GLUCOMTR-MCNC: 78 MG/DL (ref 70–130)
HBA1C MFR BLD: 5.2 % (ref 4.8–5.6)
HCT VFR BLD AUTO: 39.8 % (ref 34–46.6)
HDLC SERPL-MCNC: 46 MG/DL (ref 40–60)
HGB BLD-MCNC: 12.9 G/DL (ref 12–15.9)
LDLC SERPL CALC-MCNC: 62 MG/DL (ref 0–100)
LDLC/HDLC SERPL: 1.36 {RATIO}
MAGNESIUM SERPL-MCNC: 2.2 MG/DL (ref 1.6–2.4)
MCH RBC QN AUTO: 31 PG (ref 26.6–33)
MCHC RBC AUTO-ENTMCNC: 32.4 G/DL (ref 31.5–35.7)
MCV RBC AUTO: 95.7 FL (ref 79–97)
PLATELET # BLD AUTO: 198 10*3/MM3 (ref 140–450)
PMV BLD AUTO: 9.1 FL (ref 6–12)
POTASSIUM BLD-SCNC: 3.9 MMOL/L (ref 3.5–5.2)
RBC # BLD AUTO: 4.16 10*6/MM3 (ref 3.77–5.28)
RIGHT ARM BP: NORMAL MMHG
SODIUM BLD-SCNC: 140 MMOL/L (ref 136–145)
TRIGL SERPL-MCNC: 73 MG/DL (ref 0–150)
TSH SERPL DL<=0.05 MIU/L-ACNC: 3.92 UIU/ML (ref 0.27–4.2)
VLDLC SERPL-MCNC: 14.6 MG/DL
WBC NRBC COR # BLD: 7.63 10*3/MM3 (ref 3.4–10.8)

## 2020-02-06 PROCEDURE — 82962 GLUCOSE BLOOD TEST: CPT

## 2020-02-06 PROCEDURE — 99223 1ST HOSP IP/OBS HIGH 75: CPT | Performed by: PSYCHIATRY & NEUROLOGY

## 2020-02-06 PROCEDURE — 93880 EXTRACRANIAL BILAT STUDY: CPT | Performed by: INTERNAL MEDICINE

## 2020-02-06 PROCEDURE — 97165 OT EVAL LOW COMPLEX 30 MIN: CPT

## 2020-02-06 PROCEDURE — 97535 SELF CARE MNGMENT TRAINING: CPT

## 2020-02-06 PROCEDURE — 97161 PT EVAL LOW COMPLEX 20 MIN: CPT

## 2020-02-06 PROCEDURE — 83735 ASSAY OF MAGNESIUM: CPT | Performed by: FAMILY MEDICINE

## 2020-02-06 PROCEDURE — 85027 COMPLETE CBC AUTOMATED: CPT | Performed by: NURSE PRACTITIONER

## 2020-02-06 PROCEDURE — 92610 EVALUATE SWALLOWING FUNCTION: CPT

## 2020-02-06 PROCEDURE — 80048 BASIC METABOLIC PNL TOTAL CA: CPT | Performed by: NURSE PRACTITIONER

## 2020-02-06 PROCEDURE — 83036 HEMOGLOBIN GLYCOSYLATED A1C: CPT | Performed by: NURSE PRACTITIONER

## 2020-02-06 PROCEDURE — 99233 SBSQ HOSP IP/OBS HIGH 50: CPT | Performed by: INTERNAL MEDICINE

## 2020-02-06 PROCEDURE — 80061 LIPID PANEL: CPT | Performed by: NURSE PRACTITIONER

## 2020-02-06 PROCEDURE — 92523 SPEECH SOUND LANG COMPREHEN: CPT

## 2020-02-06 PROCEDURE — 93880 EXTRACRANIAL BILAT STUDY: CPT

## 2020-02-06 PROCEDURE — 84443 ASSAY THYROID STIM HORMONE: CPT | Performed by: NURSE PRACTITIONER

## 2020-02-06 RX ORDER — SODIUM CHLORIDE 9 MG/ML
100 INJECTION, SOLUTION INTRAVENOUS CONTINUOUS
Status: ACTIVE | OUTPATIENT
Start: 2020-02-06 | End: 2020-02-07

## 2020-02-06 RX ORDER — AMLODIPINE BESYLATE 10 MG/1
10 TABLET ORAL
Status: DISCONTINUED | OUTPATIENT
Start: 2020-02-06 | End: 2020-02-11 | Stop reason: HOSPADM

## 2020-02-06 RX ADMIN — MELATONIN TAB 5 MG 5 MG: 5 TAB at 22:04

## 2020-02-06 RX ADMIN — ATORVASTATIN CALCIUM 80 MG: 40 TABLET, FILM COATED ORAL at 22:04

## 2020-02-06 RX ADMIN — SODIUM CHLORIDE, PRESERVATIVE FREE 10 ML: 5 INJECTION INTRAVENOUS at 16:09

## 2020-02-06 RX ADMIN — SODIUM CHLORIDE, PRESERVATIVE FREE 10 ML: 5 INJECTION INTRAVENOUS at 22:04

## 2020-02-06 RX ADMIN — SODIUM CHLORIDE, PRESERVATIVE FREE 10 ML: 5 INJECTION INTRAVENOUS at 16:46

## 2020-02-06 RX ADMIN — LEVOTHYROXINE SODIUM 37.5 MCG: 0.07 TABLET ORAL at 09:06

## 2020-02-06 RX ADMIN — AMLODIPINE BESYLATE 10 MG: 10 TABLET ORAL at 09:06

## 2020-02-06 RX ADMIN — SODIUM CHLORIDE 100 ML/HR: 9 INJECTION, SOLUTION INTRAVENOUS at 16:10

## 2020-02-06 NOTE — PROGRESS NOTES
Continued Stay Note  [facility]     Patient Name: Amber Thurman  MRN: [id number]  Today's Date: 2/6/2020    Admit Date: 2/5/2020    Discharge Plan     Row Name 02/06/20 1534       Plan    Plan  Home with [facility]    Patient/Family in Agreement with Plan  yes    Plan Comments  Spoke with patient at bedside. Patient is refusing inpatient rehab at this time. Patient wants to go home with [facility]. Patient's family lives next door and behind her and grandson is there all day with the patient. CM will continue to follow.    Final Discharge Disposition Code  06 - home with home health care        Discharge Codes    No documentation.             Atul Campbell RN

## 2020-02-06 NOTE — PLAN OF CARE
"  Problem: Patient Care Overview  Goal: Plan of Care Review  Outcome: Ongoing (interventions implemented as appropriate)  Flowsheets (Taken 2/6/2020 0810)  Outcome Summary: OT eval complete. Pt with frequent complaints of being \"too cold\". Pt completes bed mobility with Juanita and transfers with modAx1 +1 for equipment management. Per pt she requires modA for all dressing and bathing at baseline. Pt completed toileting with setup for froilan care and maxA for clothing management. Pt required Juanita for tray set up but able to self feed independently. Recommend d/c to IPR when medically ready. If pt returns home recommend 24/7 assist for safety and HH OT.    "

## 2020-02-06 NOTE — CONSULTS
Chart review for diabetes educator consult.    At the time of this review patient A1c is 5.2 , they have no noted history of diabetes and no home medications noted for treatment of diabetes. At this time we do not feel the patient would benefit from diabetes education. Thank you for this consult, should patient needs change please re consult us.

## 2020-02-06 NOTE — PLAN OF CARE
Problem: Patient Care Overview  Goal: Plan of Care Review  Outcome: Ongoing (interventions implemented as appropriate)  Flowsheets (Taken 2/6/2020 0346)  Plan of Care Reviewed With: patient  Outcome Summary: PT PRESENTS WITH EVOLVING SYMPTOMS TO INCLUDE GENERALIZED WEAKNESS, R>LLE, IMPAIRED BALANCE AND DECLINE IN FUNCTIONAL MOBILITY. PT REQUIRED MIN ASSIST FOR STS AND GAIT X 80 FEET WITH R WALKER. PT IS FEARFUL OF FALLING. RECOMMEND SHORT IRF STAY OR HOME WITH 24/7 ASSIST WITH MOBILITY UNTIL DEEMED SAFE BY HHPT.

## 2020-02-06 NOTE — PROGRESS NOTES
[facility] Medicine Services  PROGRESS NOTE    Patient Name: Amber Thurman  : 3/13/1930  MRN: [id number]    Date of Admission: 2020  Primary Care Physician: Dinh Alan MD    Subjective   Subjective     CC:  Follow up weakness    HPI:  Biggest complaint this AM is needing to use the restroom. Says she can't walk at home because of her neuropathy. Denies acute complaint. No family at bedside.     Review of Systems  Gen- No fevers, chills  CV- No chest pain, palpitations  Resp- No cough, dyspnea  GI- No N/V/D, abd pain    Objective   Objective     Vital Signs:   Temp:  [97.9 °F (36.6 °C)-98.1 °F (36.7 °C)] 98.1 °F (36.7 °C)  Heart Rate:  [] 90  Resp:  [16-18] 16  BP: (128-175)/() 128/91  Total (NIH Stroke Scale): 1     Physical Exam:  Constitutional: No acute distress, awake, alert, frail appearing older woman  HENT: NCAT, mucous membranes moist  Respiratory: Clear to auscultation bilaterally, respiratory effort normal   Cardiovascular: Slightly tachycardic, RR, no murmurs, rubs, or gallops, palpable radial pulses bilaterally  Gastrointestinal: Positive bowel sounds, soft, nontender, nondistended  Musculoskeletal: No bilateral ankle edema  Psychiatric: Pleasantly demented  Neurologic: Strength is symmetrical, no slurring of speech    Results Reviewed:  Results from last 7 days   Lab Units 20  0419 20  1403   WBC 10*3/mm3 7.63 9.26   HEMOGLOBIN g/dL 12.9 13.7   HEMATOCRIT % 39.8 42.6   PLATELETS 10*3/mm3 198 225     Results from last 7 days   Lab Units 20  0419 20  1403   SODIUM mmol/L 140 140   POTASSIUM mmol/L 3.9 4.0   CHLORIDE mmol/L 108* 104   CO2 mmol/L 22.0 26.0   BUN mg/dL 18 17   CREATININE mg/dL 0.89 0.85   GLUCOSE mg/dL 112* 90   CALCIUM mg/dL 8.7 9.0   ALT (SGPT) U/L  --  8   AST (SGOT) U/L  --  17   TROPONIN T ng/mL  --  0.020   PROBNP pg/mL  --  201.2     Estimated Creatinine Clearance: 32.2 mL/min (by C-G formula based on SCr  of 0.89 mg/dL).    Microbiology Results Abnormal     Procedure Component Value - Date/Time    Blood Culture - Blood, Arm, Right [[id number]] Collected:  02/05/20 1530    Lab Status:  Preliminary result Specimen:  Blood from Arm, Right Updated:  02/06/20 0415     Blood Culture No growth at less than 24 hours    Blood Culture - Blood, Arm, Left [[id number]] Collected:  02/05/20 1540    Lab Status:  Preliminary result Specimen:  Blood from Arm, Left Updated:  02/06/20 0415     Blood Culture No growth at less than 24 hours          Imaging Results (Last 24 Hours)     Procedure Component Value Units Date/Time    CT Head Without Contrast [[id number]] Collected:  02/05/20 1553     Updated:  02/06/20 0848    Narrative:       EXAMINATION: CT HEAD WO CONTRAST - 02/05/2020     INDICATION: Weakness. Unable to walk.     TECHNIQUE: Unenhanced CT imaging of the head was performed with  additional coronal reformatted imaging provided for review.     The radiation dose reduction device was turned on for each scan per the  ALARA (As Low as Reasonably Achievable) protocol.     COMPARISON: CT of the head 8/23/2019     FINDINGS: Unenhanced CT imaging of the head demonstrates no evidence for  midline shift or mass effect. No hydrocephalus. No extra-axial fluid  collection or hemorrhage is identified. Chronic microvascular ischemic  changes identified similar to prior. There is no convincing evidence for  acute transcortical infarct identified as visualized. The visualized  calvarium appears intact. No evidence of depressed skull fracture.  Paranasal sinuses appear clear. Evaluation of the coronal images  demonstrate no convincing evidence for extra-axial fluid collection.  Calvarium is intact. The surrounding soft tissues are unrevealing.  Postsurgical changes to the globes are identified. Visualized upper  oropharynx is not reveal acute abnormality or lesion.       Impression:       Chronic age-related changes of the brain without  evidence  for acute abnormality appreciated.     DICTATED:   02/05/2020  EDITED/ls :   02/05/2020      This report was finalized on 2/6/2020 8:45 AM by Dr. Senthil Campbell MD.       XR Chest 1 View [[id number]] Collected:  02/05/20 1356     Updated:  02/06/20 0847    Narrative:       EXAMINATION: XR CHEST 1 VW- 02/05/2020     INDICATION: Weak/Dizzy/AMS triage protocol      COMPARISON: Chest radiograph 08/23/2019     FINDINGS: Single portable chest radiograph was submitted for review. The  patient is markedly side bent rotated, similar to prior exam. Heart is  not enlarged.  Atherosclerotic calcification of the aorta identified.  Chronic appearing changes in the lungs are identified without convincing  evidence of active airspace disease. No pleural effusion or  pneumothorax. Visualized upper abdomen is unrevealing. Thoracic spinal  curvature is again noted similar to prior. Vertebroplasty cement is  noted. Diffuse osteopenia and degenerative changes of the shoulders  identified.           Impression:       Chronic appearing changes of the chest without convincing  evidence for active airspace disease.     D:  02/05/2020  E:  02/05/2020     This report was finalized on 2/6/2020 8:43 AM by Dr. Senthil Campbell MD.       MRI Brain Without Contrast [[id number]] Collected:  02/05/20 1939     Updated:  02/05/20 1941    Narrative:       INDICATION:    Weakness, slurred speech that started 3 days ago. Patient feels weak with standing. History of TIA.    TECHNIQUE:   MRI of the brain without contrast.    COMPARISON:    Head CT from 8/23/2019 is available for review. Head CT from earlier this evening is not currently available for review.    FINDINGS:  There are punctate areas of restricted diffusion involving the bilateral centrum semiovale. This includes a 5 mm lesion in the left superior parietal white matter and a 4 mm lesion in the right posterior frontal white matter. These tiny recent infarcts  could be due to small  vessel disease or could be due to a subtle watershed insult. There is generalized atrophy. There is extensive pre-existing white matter disease as well as pre-existing bilateral basal ganglionic and thalamic lacunar insults. These  findings favor recent small vessel insults to the bilateral centrum semiovale. Additionally there is moderate signal abnormality in the bilateral kobi which is likely due to small vessel disease. There is no extra-axial fluid collection or intracranial  mass effect. There is punctate susceptibility in the left posterior superior insula consistent with remote blood product or mineral deposition. No recent hemorrhage is suspected. No intracranial mass effect. No hydrocephalus. The major arterial  intracranial flow voids are maintained. The mastoid air cells and visualized paranasal sinuses are clear.      Impression:         1. There are 2 very small areas of recent infarct involving the bilateral centrum semiovale. These are likely tiny recent small vessel insults superimposed upon a background of extensive pre-existing small vessel disease. There is no evidence for mass  effect or hemorrhagic transformation.    Signer Name: Aliza Zarate MD   Signed: 2/5/2020 7:39 PM   Workstation Name: [id number]    [facility]          Results for orders placed during the hospital encounter of 08/23/19   Adult Transthoracic Echo Complete W/ Cont if Necessary Per Protocol    Narrative · Left ventricular systolic function is normal. Estimated EF appears to be   in the range of 66 - 70%.  · Sigmoid-shaped ventricular septum is present.  · There is moderate calcification of the aortic valve.Trace-to-mild aortic   valve regurgitation is present. No aortic valve stenosis is present  · Mild tricuspid valve regurgitation is present. Estimated right   ventricular systolic pressure from tricuspid regurgitation is normal (<35   mmHg).          I have reviewed the medications:  Scheduled  "Meds:  amLODIPine 10 mg Oral Q24H   atorvastatin 80 mg Oral Nightly   levothyroxine 37.5 mcg Oral Q AM   sodium chloride 10 mL Intravenous Q12H     Continuous Infusions:   PRN Meds:.•  acetaminophen **OR** acetaminophen **OR** acetaminophen  •  melatonin  •  ondansetron **OR** ondansetron  •  sodium chloride    Assessment/Plan   Assessment & Plan     Active Hospital Problems    Diagnosis  POA   • **CVA (cerebral vascular accident) (CMS/[facility]) [I63.9]  Yes   • Generalized weakness [R53.1]  Yes   • Hypothyroidism (acquired) [E03.9]  Yes      Resolved Hospital Problems   No resolved problems to display.        Brief Hospital Course to date:  Amber Thurman is a 89 y.o. female with Hx remote TIA, hypothyroidism, peripheral neuropathy, Aflutter not on anticoagulation due to falls at home who was brought in by family for overall decline over several days found to have had 2 recent strokes    The following problems are new to me today    Assessment/Plan    Generalized weakness  - PT/OT  - volume expansion with IVF's  - unclear, possibly age related decline, related to her reported neuropathy, dehydration; UA ordered by neurology and not suggestive of infection  - rehab/skilled placement    Bilateral strokes  - MRI with small bilateral parietal insults, significant ischemic small vessel disease  - \"allergy\" to ASA so not given  - cont statin  -neurology following, cardiology consulted for recommendations on AC proceeding forward  - echo pending    Hypothyroidism  - cont home synthroid    Hx Aflutter  - likely in and out of aflutter at home  - cardiology to see regarding AC recommendations  - currently rate controlled    DVT Prophylaxis:  mechanical    Disposition: I expect the patient to be discharged pending subspecialist evaluation, return of stroke workup, would benefit from rehad.snf placement.    CODE STATUS:   Code Status and Medical Interventions:   Ordered at: 02/05/20 2029     Code Status:    No CPR     Medical " Interventions (Level of Support Prior to Arrest):    Full     Comments:    DNR/ DNI         Electronically signed by J Luis Morfin DO, 02/06/20, 1:58 PM.

## 2020-02-06 NOTE — THERAPY EVALUATION
Acute Care - Speech Language Pathology Initial Evaluation  [facility]   Cognitive-Communication Evaluation  Clinical Swallow Evaluation     Patient Name: Amber Thurman  : 3/13/1930  MRN: [id number]  Today's Date: 2020  Onset of Illness/Injury or Date of Surgery: 20     Referring Physician: DO Aaron       Admit Date: 2020     Visit Dx:    ICD-10-CM ICD-9-CM   1. Generalized weakness R53.1 780.79   2. At risk for falls Z91.81 V15.88   3. Impaired mobility and ADLs Z74.09 799.89   4. Cognitive communication deficit R41.841 799.52     Patient Active Problem List   Diagnosis   • Atrial flutter (CMS/HCC)   • Hypothyroidism (acquired)   • Generalized weakness   • CVA (cerebral vascular accident) (CMS/HCC)     Past Medical History:   Diagnosis Date   • Acute UTI (urinary tract infection) 2019   • Disease of thyroid gland    • Rhabdomyolysis 2019   • TIA (transient ischemic attack)      Past Surgical History:   Procedure Laterality Date   • ANKLE SURGERY     • BACK SURGERY     • FRACTURE SURGERY     • HERNIA REPAIR          SLP EVALUATION (last 72 hours)      SLP SLC Evaluation     Row Name 20 0930                   Comprehension Assessment/Intervention    Comprehension Assessment/Intervention  Auditory Comprehension  -SM           Auditory Comprehension Assessment/Intervention    Answers Questions (Communication)  yes/no;wh questions;personal;simple;WFL;mild impairment  -SM        Able to Follow Commands (Communication)  1-step;WFL;mild impairment  -SM        Narrative Discourse  conversational level;WFL;mild impairment  -SM        Auditory Comprehension Communication, Comment  Slow to respond at times. Occassional reps required  -SM           Expression Assessment/Intervention    Expression Assessment/Intervention  verbal expression  -SM           Verbal Expression Assessment/Intervention    Conversational Discourse/Fluency  WFL  -SM           Oral Musculature and Cranial Nerve  Assessment    Oral Motor General Assessment  WFL  -SM           Motor Speech Assessment/Intervention    Motor Speech Function  WFL  -SM           Cognitive Assessment Intervention- SLP    Orientation Status (Cognition)  person;WFL;other (see comments) disoriented to place, time, and specifics to situation  -        Memory (Cognitive)  moderate impairment;short-term;mental manipulation;new learning  -        Attention (Cognitive)  sustained;mild impairment  -        Thought Organization (Cognitive)  mild impairment;moderate impairment  -        Reasoning (Cognitive)  mild impairment;moderate impairment  -SM        Problem Solving (Cognitive)  mild impairment;other (see comments) ? judgement. Aware of call bell presence and use  -           SLP Clinical Impressions    SLP Diagnosis  Mild-moderate cog-comm deficits. No family present to discuss ? baseline status. Will f/u for tx and adjust tx plan accordingly.   -        Rehab Potential/Prognosis  good  -        SLC Criteria for Skilled Therapy Interventions Met  yes  -        Functional Impact  need frequent supervision;difficulty completing home management task  -           Recommendations    Therapy Frequency (SLP SLC)  5 days per week  -        Predicted Duration Therapy Intervention (Days)  until discharge  -        Anticipated Dischage Disposition  inpatient rehabilitation facility;anticipate therapy at next level of care  -          User Key  (r) = Recorded By, (t) = Taken By, (c) = Cosigned By    Initials Name Effective Dates     Nina Ponce MS CCC-SLP 06/22/15 -              EDUCATION  The patient has been educated in the following areas:     Cognitive Impairment Communication Impairment.    SLP Recommendation and Plan  SLP Diagnosis: Mild-moderate cog-comm deficits. No family present to discuss ? baseline status. Will f/u for tx and adjust tx plan accordingly.      Swallow Criteria for Skilled Therapeutic Interventions Met:  "no problems identified which require skilled intervention  SLC Criteria for Skilled Therapy Interventions Met: yes  Anticipated Dischage Disposition: inpatient rehabilitation facility, anticipate therapy at next level of care  Therapy Frequency (Swallow): evaluation only  Predicted Duration Therapy Intervention (Days): until discharge    Plan of Care Reviewed With: patient      SLP GOALS     Row Name 02/06/20 0945             Orientation Goal 1 (SLP)    Improve Orientation Through Goal 1 (SLP)  demonstrating orientation to month;demonstrating orientation to year;demonstrating orientation to place;demonstrating orientation to disease/impairment;100%;with minimal cues (75-90%)  -SM      Time Frame (Orientation Goal 1, SLP)  short term goal (STG)  -SM         Memory Skills Goal 1 (SLP)    Improve Memory Skills Through Goal 1 (SLP)  recalling related word lists with an imposed delay;select a word from a list by exclusion;repeat list in sequential order;80%;with moderate cues (50-74%)  -SM      Time Frame (Memory Skills Goal 1, SLP)  short term goal (STG)  -SM         Functional Problem Solving Skills Goal 1 (SLP)    Improve Problem Solving Through Goal 1 (SLP)  determine solutions to simple ADL/safety problems;answer \"what if\" questions;sequence steps in a task;100%;with minimal cues (75-90%)  -SM      Time Frame (Problem Solving Goal 1, SLP)  short term goal (STG)  -SM         Executive Functional Skills Goal 1 (SLP)    Improve Executive Function Skills Goal 1 (SLP)  demonstrate awareness of deficit;identify strategies, strengths, limitations;perform self-evaluation;70%;with moderate cues (50-74%)  -SM      Time Frame (Executive Function Skills Goal 1, SLP)  short term goal (STG)  -SM         Additional Goal 1 (SLP)    Additional Goal 1, SLP  LTG: Improve cog-comm skills in order to participate in care while in hospital setting with 100% accuracy and cues  -SM      Time Frame (Additional Goal 1, SLP)  short term goal " (STG)  -        User Key  (r) = Recorded By, (t) = Taken By, (c) = Cosigned By    Initials Name Provider Type    Nina Pineda MS CCC-SLP Speech and Language Pathologist                  Time Calculation:     Time Calculation- SLP     Row Name 20 1336             Time Calculation- SLP    SLP Start Time  0930  -        User Key  (r) = Recorded By, (t) = Taken By, (c) = Cosigned By    Initials Name Provider Type    Nina Pineda MS CCC-SLP Speech and Language Pathologist          Therapy Charges for Today     Code Description Service Date Service Provider Modifiers Qty    83747768957 HC ST EVAL ORAL PHARYNG SWALLOW 2 2020 Nina Ponce MS CCC-SLP GN 1    91159322696 HC ST EVAL SPEECH AND PROD W LANG  2 2020 Nina Ponce MS CCC-SLP GN 1                     Nina SINGH. MS GEOVANNA Ponce-SLP  2020 and Acute Care - Speech Language Pathology   Swallow Initial Evaluation [facility]     Patient Name: Amber Thurman  : 3/13/1930  MRN: [id number]  Today's Date: 2020  Onset of Illness/Injury or Date of Surgery: 20     Referring Physician: DO Aaron       Admit Date: 2020    Visit Dx:     ICD-10-CM ICD-9-CM   1. Generalized weakness R53.1 780.79   2. At risk for falls Z91.81 V15.88   3. Impaired mobility and ADLs Z74.09 799.89   4. Cognitive communication deficit R41.841 799.52     Patient Active Problem List   Diagnosis   • Atrial flutter (CMS/HCC)   • Hypothyroidism (acquired)   • Generalized weakness   • CVA (cerebral vascular accident) (CMS/HCC)     Past Medical History:   Diagnosis Date   • Acute UTI (urinary tract infection) 2019   • Disease of thyroid gland    • Rhabdomyolysis 2019   • TIA (transient ischemic attack)      Past Surgical History:   Procedure Laterality Date   • ANKLE SURGERY     • BACK SURGERY     • FRACTURE SURGERY     • HERNIA REPAIR          SWALLOW EVALUATION (last 72 hours)      SLP Adult Swallow Evaluation      Row Name 02/06/20 0930                   Rehab Evaluation    Document Type  evaluation  -        Subjective Information  no complaints  -        Patient Observations  alert;cooperative  -           General Information    Patient Profile Reviewed  yes  -        Pertinent History Of Current Problem  B CVA. Failed initial nursing dysphagia screen. Passed subsequent screens though RN requested to check to ensure no issues given generalized weakness  -        Prior Level of Function-Swallowing  safe, efficient swallowing in all situations  -        Plans/Goals Discussed with  patient;agreed upon  -        Barriers to Rehab  none identified  -        Patient's Goals for Discharge  patient did not state  -           Pain Scale: Numbers Pre/Post-Treatment    Pain Scale: Numbers, Pretreatment  4/10  -        Pain Scale: Numbers, Post-Treatment  4/10  -        Pain Location  abdomen  -        Pain Intervention(s)  -- RN notified  -           Clinical Swallow Eval    Pharyngeal Phase  no overt signs/symptoms of pharyngeal impairment  -           Clinical Impression    SLP Swallowing Diagnosis  other (see comments) Swallow WFL  -        Swallow Criteria for Skilled Therapeutic Interventions Met  no problems identified which require skilled intervention  -           Recommendations    Therapy Frequency (Swallow)  evaluation only  -        SLP Diet Recommendation  regular textures;thin liquids  -        Recommended Precautions and Strategies  upright posture during/after eating  -        SLP Rec. for Method of Medication Administration  as tolerated  -          User Key  (r) = Recorded By, (t) = Taken By, (c) = Cosigned By    Initials Name Effective Dates     Nina Ponce MS CCC-SLP 06/22/15 -           EDUCATION  The patient has been educated in the following areas:   Dysphagia (Swallowing Impairment).    SLP Recommendation and Plan  SLP Swallowing Diagnosis: other (see  "comments)(Swallow WFL)  SLP Diet Recommendation: regular textures, thin liquids  Recommended Precautions and Strategies: upright posture during/after eating  SLP Rec. for Method of Medication Administration: as tolerated           Swallow Criteria for Skilled Therapeutic Interventions Met: no problems identified which require skilled intervention  Anticipated Dischage Disposition: inpatient rehabilitation facility, anticipate therapy at next level of care     Therapy Frequency (Swallow): evaluation only  Predicted Duration Therapy Intervention (Days): until discharge       Plan of Care Reviewed With: patient    SLP GOALS     Row Name 02/06/20 2301             Orientation Goal 1 (SLP)    Improve Orientation Through Goal 1 (SLP)  demonstrating orientation to month;demonstrating orientation to year;demonstrating orientation to place;demonstrating orientation to disease/impairment;100%;with minimal cues (75-90%)  -SM      Time Frame (Orientation Goal 1, SLP)  short term goal (STG)  -SM         Memory Skills Goal 1 (SLP)    Improve Memory Skills Through Goal 1 (SLP)  recalling related word lists with an imposed delay;select a word from a list by exclusion;repeat list in sequential order;80%;with moderate cues (50-74%)  -SM      Time Frame (Memory Skills Goal 1, SLP)  short term goal (STG)  -SM         Functional Problem Solving Skills Goal 1 (SLP)    Improve Problem Solving Through Goal 1 (SLP)  determine solutions to simple ADL/safety problems;answer \"what if\" questions;sequence steps in a task;100%;with minimal cues (75-90%)  -SM      Time Frame (Problem Solving Goal 1, SLP)  short term goal (STG)  -SM         Executive Functional Skills Goal 1 (SLP)    Improve Executive Function Skills Goal 1 (SLP)  demonstrate awareness of deficit;identify strategies, strengths, limitations;perform self-evaluation;70%;with moderate cues (50-74%)  -SM      Time Frame (Executive Function Skills Goal 1, SLP)  short term goal (STG)  -SM  "        Additional Goal 1 (SLP)    Additional Goal 1, SLP  LTG: Improve cog-comm skills in order to participate in care while in hospital setting with 100% accuracy and cues  -      Time Frame (Additional Goal 1, SLP)  short term goal (STG)  -SM        User Key  (r) = Recorded By, (t) = Taken By, (c) = Cosigned By    Initials Name Provider Type    Nina Pineda MS CCC-SLP Speech and Language Pathologist             Time Calculation:   Time Calculation- SLP     Row Name 02/06/20 1336             Time Calculation- SLP    SLP Start Time  0930  -        User Key  (r) = Recorded By, (t) = Taken By, (c) = Cosigned By    Initials Name Provider Type    Nina Pineda MS CCC-SLP Speech and Language Pathologist          Therapy Charges for Today     Code Description Service Date Service Provider Modifiers Qty    06123089379 HC ST EVAL ORAL PHARYNG SWALLOW 2 2/6/2020 Nina Ponce MS CCC-SLP GN 1    92083037824 HC ST EVAL SPEECH AND PROD W LANG  2 2/6/2020 Nina Ponce MS CCC-SLP GN 1               Nina Ponce MS CCC-SLP  2/6/2020

## 2020-02-06 NOTE — THERAPY EVALUATION
Acute Care - Occupational Therapy Initial Evaluation  [facility]     Patient Name: Amber Thurman  : 3/13/1930  MRN: [id number]  Today's Date: 2020  Onset of Illness/Injury or Date of Surgery: 20  Date of Referral to OT: 20  Referring Physician: DO Aaron     Admit Date: 2020       ICD-10-CM ICD-9-CM   1. Generalized weakness R53.1 780.79   2. At risk for falls Z91.81 V15.88   3. Impaired mobility and ADLs Z74.09 799.89     Patient Active Problem List   Diagnosis   • Atrial flutter (CMS/HCC)   • Hypothyroidism (acquired)   • Generalized weakness   • CVA (cerebral vascular accident) (CMS/HCC)     Past Medical History:   Diagnosis Date   • Acute UTI (urinary tract infection) 2019   • Disease of thyroid gland    • Rhabdomyolysis 2019   • TIA (transient ischemic attack)      Past Surgical History:   Procedure Laterality Date   • ANKLE SURGERY     • BACK SURGERY     • FRACTURE SURGERY     • HERNIA REPAIR            OT ASSESSMENT FLOWSHEET (last 12 hours)      Occupational Therapy Evaluation     Row Name 20 0810                   OT Evaluation Time/Intention    Subjective Information  no complaints  -HK        Document Type  evaluation  -HK        Mode of Treatment  occupational therapy  -HK        Patient Effort  good  -HK        Symptoms Noted During/After Treatment  none  -HK           General Information    Patient Profile Reviewed?  yes  -HK        Onset of Illness/Injury or Date of Surgery  20  -HK        Referring Physician  DO Aaron   -        Patient Observations  alert;cooperative;agree to therapy  -HK        Patient/Family Observations  No family at bedside.   -HK        General Observations of Patient  Pt recieved supine in bed with IV heplocked.   -HK        Prior Level of Function  independent:;all household mobility;community mobility;transfer;gait;bed mobility;mod assist:;ADL's per pt; however poor historian  -HK        Equipment Currently Used at  Home  walker, rolling  -HK        Existing Precautions/Restrictions  fall  -HK        Risks Reviewed  patient:;dizziness;nausea/vomiting;LOB;increased discomfort;change in vital signs;increased drainage;lines disloged  -HK        Benefits Reviewed  patient:;improve function;increase independence;increase strength;increase balance;decrease pain;decrease risk of DVT;improve skin integrity;increase knowledge  -HK        Barriers to Rehab  medically complex;previous functional deficit  -HK           Relationship/Environment    Primary Source of Support/Comfort  child(patricia);other (see comments) grand children   -HK        Name of Support/Comfort Primary Source  family lives near by   -HK        Lives With  alone  -HK           Resource/Environmental Concerns    Current Living Arrangements  home/apartment/condo  -HK           Home Main Entrance    Number of Stairs, Main Entrance  two  -HK           Cognitive Assessment/Interventions    Additional Documentation  Cognitive Assessment/Intervention (Group)  -HK           Cognitive Assessment/Intervention- PT/OT    Affect/Mental Status (Cognitive)  anxious  -HK        Orientation Status (Cognition)  oriented x 4  -HK        Follows Commands (Cognition)  follows one step commands;over 90% accuracy  -HK        Cognitive Function (Cognitive)  safety deficit;memory deficit  -HK        Memory Deficit (Cognitive)  moderate deficit  -HK        Safety Deficit (Cognitive)  moderate deficit  -HK           Safety Issues, Functional Mobility    Safety Issues Affecting Function (Mobility)  sequencing abilities;safety precautions follow-through/compliance;safety precaution awareness;judgment;insight into deficits/self awareness;awareness of need for assistance  -HK        Impairments Affecting Function (Mobility)  balance;pain;strength  -HK           Bed Mobility Assessment/Treatment    Bed Mobility Assessment/Treatment  supine-sit;scooting/bridging  -HK        Scooting/Bridging [place]  (Bed Mobility)  supervision;verbal cues  -HK        Supine-Sit [place] (Bed Mobility)  minimum assist (75% patient effort);verbal cues  -HK        Bed Mobility, Safety Issues  decreased use of arms for pushing/pulling;impaired trunk control for bed mobility  -HK        Assistive Device (Bed Mobility)  head of bed elevated;bed rails  -HK        Comment (Bed Mobility)  Pt advanced to EOB with Juanita from OT.   -HK           Functional Mobility    Functional Mobility- Ind. Level  moderate assist (50% patient effort);1 person + 1 person to manage equipment;other (see comments)  -HK        Functional Mobility- Device  -- BUE support   -HK        Functional Mobility-Distance (Feet)  3  -HK        Functional Mobility- Safety Issues  weight-shifting ability decreased;step length decreased  -HK        Functional Mobility- Comment  Pt took steps from bed to BSC.   -HK           Transfer Assessment/Treatment    Transfer Assessment/Treatment  sit-stand transfer;stand-sit transfer;stand pivot/stand step transfer  -HK        Comment (Transfers)  Verbal cues for safe hand placement, sequencing, and to maintain upright posture.   -HK           Sit-Stand Transfer    Sit-Stand [place] (Transfers)  moderate assist (50% patient effort);1 person to manage equipment;1 person assist;verbal cues  -HK        Assistive Device (Sit-Stand Transfers)  other (see comments) BUE support   -HK           Stand-Sit Transfer    Stand-Sit [place] (Transfers)  moderate assist (50% patient effort);1 person assist;1 person to manage equipment  -HK        Assistive Device (Stand-Sit Transfers)  other (see comments) BUE support   -HK           Stand Pivot/Stand Step Transfer    Stand Pivot/Stand Step [place]  moderate assist (50% patient effort);1 person assist;1 person to manage equipment;verbal cues  -HK        Assistive Device (Stand Pivot Stand Step Transfer)  -- BUE support   -HK           ADL Assessment/Intervention    BADL  Assessment/Intervention  toileting;feeding;grooming  -HK           Grooming Assessment/Training    [place] Level (Grooming)  wash face, hands;set up  -HK        Grooming Position  supported sitting  -HK           Self-Feeding Assessment/Training    [place] Level (Feeding)  scoop food and bring to mouth;liquids to mouth;independent;prepare tray/open items;minimum assist (75% patient effort)  -HK        Self-Feeding Assess/Train, Spillage Amount  minimal  -HK        Position (Self-Feeding)  supported sitting  -HK        Comment (Feeding)  Pt required Juanita for all set up.   -HK           Toileting Assessment/Training    [place] Level (Toileting)  perform perineal hygiene;set up;adjust/manage clothing;maximum assist (25% patient effort)  -HK        Toileting Position  unsupported sitting  -HK        Comment (Toileting)  Pt completed froilan care with setup and required maxA for clothing management.   -HK           BADL Safety/Performance    Impairments, BADL Safety/Performance  balance;strength;range of motion;coordination;cognition  -HK        Cognitive Impairments, BADL Safety/Performance  sequencing abilities;safety precaution follow-through;safety precaution awareness;judgment;insight into deficits/self awareness;awareness, need for assistance  -HK           General ROM    RT Upper Ext  Rt Shoulder Flexion  -HK        LT Upper Ext  Lt Shoulder Flexion  -HK           Right Upper Ext    Rt Shoulder Flexion AROM  R shoulder/elbow crepitus noted with movement. AROM grossly 90 degrees.   -HK        Rt Shoulder Flexion PROM  PROM 120   -HK           Left Upper Ext    Lt Shoulder Flexion AROM  AROM 120  -HK           MMT (Manual Muscle Testing)    Rt Upper Ext  Rt Shoulder Flexion  -HK        Lt Upper Ext  Lt Shoulder Flexion  -HK           MMT Right Upper Ext    Rt Shoulder Flexion MMT, Gross Movement  (3/5) fair  -HK           MMT Left Upper Ext    Lt Shoulder Flexion MMT, Gross Movement  (3+/5) fair plus   -HK           Motor Assessment/Interventions    Additional Documentation  Balance (Group);Fine Motor Testing & Training (Group);Gross Motor Coordination (Group)  -HK           Gross Motor Coordination    Gross Motor Skill, Impairments Detail  intact   -HK           Balance    Balance  static sitting balance;static standing balance;dynamic sitting balance  -HK           Static Sitting Balance    Level of [place] (Unsupported Sitting, Static Balance)  contact guard assist  -HK        Sitting Position (Unsupported Sitting, Static Balance)  sitting on edge of bed  -HK        Time Able to Maintain Position (Unsupported Sitting, Static Balance)  more than 5 minutes  -HK           Dynamic Sitting Balance    Level of [place], Reaches Outside Midline (Sitting, Dynamic Balance)  contact guard assist  -HK        Sitting Position, Reaches Outside Midline (Sitting, Dynamic Balance)  other (see comments) BSC  -HK        Comment, Reaches Outside Midline (Sitting, Dynamic Balance)  completing froilan care   -HK           Static Standing Balance    Level of [place] (Supported Standing, Static Balance)  moderate assist, 50 to 74% patient effort  -HK        Time Able to Maintain Position (Supported Standing, Static Balance)  30 to 45 seconds  -HK        Assistive Device Utilized (Supported Standing, Static Balance)  -- BUE support   -HK        Comment (Supported Standing, Static Balance)  Pt with posterior lean   -HK           Fine Motor Testing & Training    Fine Motor Tests  other (see comments) tying gown together   -HK        Comment, Fine Motor Coordination  intact   -HK           Sensory Assessment/Intervention    Sensory General Assessment  no sensation deficits identified  -HK        Additional Documentation  Vision Assessment/Intervention (Group)  -HK           Vision Assessment/Intervention    Visual Impairment/Limitations  corrective lenses full time pt does not have glasses; difficult to assess  -HK            Positioning and Restraints    Pre-Treatment Position  in bed  -HK        Post Treatment Position  chair  -HK        In Chair  notified nsg;reclined;call light within reach;encouraged to call for assist;exit alarm on  -HK           Pain Assessment    Additional Documentation  Pain Scale: Numbers Pre/Post-Treatment (Group)  -HK           Pain Scale: Numbers Pre/Post-Treatment    Pain Scale: Numbers, Pretreatment  0/10 - no pain  -HK        Pain Scale: Numbers, Post-Treatment  0/10 - no pain  -HK           Coping    Observed Emotional State  accepting;calm;cooperative  -HK           Plan of Care Review    Plan of Care Reviewed With  patient  -HK        Progress  improving  -HK           Clinical Impression (OT)    Date of Referral to OT  02/05/20  -HK        OT Diagnosis  Decreased independence with ADLS and Mobility   -HK        Patient/Family Goals Statement (OT Eval)  Pt would like to improve and return home.   -HK        Criteria for Skilled Therapeutic Interventions Met (OT Eval)  yes;treatment indicated  -HK        Rehab Potential (OT Eval)  good, to achieve stated therapy goals  -HK        Therapy Frequency (OT Eval)  daily  -HK        Care Plan Review (OT)  evaluation/treatment results reviewed;care plan/treatment goals reviewed;risks/benefits reviewed;patient/other agree to care plan  -HK        Anticipated Discharge Disposition (OT)  inpatient rehabilitation facility  -HK           Vital Signs    Pre Systolic BP Rehab  158  -HK        Pre Treatment Diastolic BP  113  -HK        Post Systolic BP Rehab  139  -HK        Post Treatment Diastolic BP  108  -HK        Pre Patient Position  Supine  -HK        Intra Patient Position  Standing  -HK        Post Patient Position  Sitting  -HK           OT Goals    Bed Mobility Goal Selection (OT)  bed mobility, OT goal 1  -HK        Transfer Goal Selection (OT)  transfer, OT goal 1  -HK        Toileting Goal Selection (OT)  toileting, OT goal 1  -HK        Grooming Goal  Selection (OT)  grooming, OT goal 1  -HK        Additional Documentation  Grooming Goal Selection (OT) (Row)  -HK           Bed Mobility Goal 1 (OT)    Activity/Assistive Device (Bed Mobility Goal 1, OT)  sit to supine/supine to sit;scooting  -HK        [place] Level/Cues Needed (Bed Mobility Goal 1, OT)  standby assist  -HK        Time Frame (Bed Mobility Goal 1, OT)  5 days  -HK        Progress/Outcomes (Bed Mobility Goal 1, OT)  goal ongoing  -HK           Transfer Goal 1 (OT)    Activity/Assistive Device (Transfer Goal 1, OT)  sit-to-stand/stand-to-sit;toilet  -HK        [place] Level/Cues Needed (Transfer Goal 1, OT)  contact guard assist  -HK        Time Frame (Transfer Goal 1, OT)  5 days  -HK        Progress/Outcome (Transfer Goal 1, OT)  goal ongoing  -HK           Toileting Goal 1 (OT)    Activity/Device (Toileting Goal 1, OT)  adjust/manage clothing;perform perineal hygiene  -HK        [place] Level/Cues Needed (Toileting Goal 1, OT)  standby assist  -HK        Time Frame (Toileting Goal 1, OT)  5 days  -HK        Progress/Outcome (Toileting Goal 1, OT)  goal ongoing  -HK           Grooming Goal 1 (OT)    Activity/Device (Grooming Goal 1, OT)  hair care;oral care;wash face, hands  -HK        [place] (Grooming Goal 1, OT)  verbal cues required;minimum assist (75% or more patient effort)  -HK        Time Frame (Grooming Goal 1, OT)  5 days  -HK        Progress/Outcome (Grooming Goal 1, OT)  goal ongoing  -HK           Living Environment    Home Accessibility  stairs to enter home;tub/shower is not walk in  -HK          User Key  (r) = Recorded By, (t) = Taken By, (c) = Cosigned By    Initials Name Effective Dates    HK Kathy Miller, OT 03/07/18 -                OT Recommendation and Plan  Outcome Summary/Treatment Plan (OT)  Anticipated Discharge Disposition (OT): inpatient rehabilitation facility  Therapy Frequency (OT Eval): daily  Plan of Care Review  Plan of Care Reviewed With:  "patient  Plan of Care Reviewed With: patient  Outcome Summary: OT eval complete. Pt with frequent complaints of being \"too cold\". Pt completes bed mobility with Juanita and transfers with modAx1 +1 for equipment management. Per pt she requires modA for all dressing and bathing at baseline. Pt completed toileting with setup for froilan care and maxA for clothing management. Pt required Juanita for tray set up but able to self feed independently. Recommend d/c to IPR when medically ready. If pt returns home recommend 24/7 assist for safety and HH OT.    Outcome Measures     Row Name 02/06/20 0810             How much help from another is currently needed...    Putting on and taking off regular lower body clothing?  2  -HK      Bathing (including washing, rinsing, and drying)  2  -HK      Toileting (which includes using toilet bed pan or urinal)  2  -HK      Putting on and taking off regular upper body clothing  3  -HK      Taking care of personal grooming (such as brushing teeth)  3  -HK      Eating meals  3  -HK      AM-PAC 6 Clicks Score (OT)  15  -HK         Modified [place] Scale    Modified [place] Scale  3 - Moderate disability.  Requiring some help, but able to walk without assistance.  -HK         Functional Assessment    Outcome Measure Options  AM-PAC 6 Clicks Daily Activity (OT)  -HK        User Key  (r) = Recorded By, (t) = Taken By, (c) = Cosigned By    Initials Name Provider Type    Kathy Vance OT Occupational Therapist          Time Calculation:   Time Calculation- OT     Row Name 02/06/20 0810             Time Calculation- OT    OT Start Time  0810  -HK      OT Received On  02/06/20  -      OT Goal Re-Cert Due Date  02/16/20  -         Timed Charges    03245 - OT Self Care/Mgmt Minutes  10  -HK        User Key  (r) = Recorded By, (t) = Taken By, (c) = Cosigned By    Initials Name Provider Type    Kathy Vance OT Occupational Therapist        Therapy Charges for Today     Code Description Service " Date Service Provider Modifiers Qty    18130017292 HC OT SELF CARE/MGMT/TRAIN EA 15 MIN 2/6/2020 Kathy Miller, OT GO 1    26387398903 HC OT EVAL LOW COMPLEXITY 3 2/6/2020 Kathy Miller, OT GO 1               Kathy Miller OT  2/6/2020

## 2020-02-06 NOTE — PROGRESS NOTES
Continued Stay Note  [facility]     Patient Name: Amber Thurman  MRN: [id number]  Today's Date: 2/6/2020    Admit Date: 2/5/2020    Discharge Plan     Row Name 02/06/20 1129       Plan    Plan  [facility]    Provided post acute provider list?  Yes    Post Acute Provider List  Inpatient Rehab    Post Acute Provider Quality & Resource List  Yes    Delivered To  Patient    Method of Delivery  In person    N/A Provider List Comment  [facility]    Patient/Family in Agreement with Plan  yes    Plan Comments  Spoke with patient at bedside. CM discussed rehab with the patient and she is agreeable to go to [facility]. Called Mikey at [facility] and made the referral. CM will continue to follow.    Final Discharge Disposition Code  62 - inpatient rehab facility        Discharge Codes    No documentation.             Atul Campbell RN

## 2020-02-06 NOTE — CONSULTS
Neurology    Referring provider:   No referring provider defined for this encounter.    Reason for Consultation: Weakness    Chief complaint: Peripheral neuropathy    History of present illness: 89-year-old woman seen for Dr. Morfin for evaluation of weakness.    She apparently has had several days of acting strangely with some slurred speech.    She apparently was waxing and waning with no focal symptoms.    On Sunday she was unable to stand.    Since then she has been having generalized weakness is been unable to ambulate.    Been unable to stand.    She has been on temazepam.    Said no recent medication changes.    She denies taking medicine for her blood pressure.    She does admit to having some short-term memory issues.    .  History of atrial flutter and hypertension on recent admission here in the summertime.    She was put on amlodipine 10 mg lisinopril 10 mg neither of which are on her med list coming in.    She also had a urinary tract infection at that time.    She has hypothyroidism and takes levothyroxine as well.          Review of Systems: Patient is unable to remember    Home meds:   Medications Prior to Admission   Medication Sig Dispense Refill Last Dose   • levothyroxine (SYNTHROID, LEVOTHROID) 75 MCG tablet Take 0.5 tablets by mouth Daily. 30 tablet 0 Past Month at Unknown time   • temazepam (RESTORIL) 15 MG capsule Take 1 capsule by mouth At Night As Needed for Sleep. 3 capsule 0 Past Month at Unknown time   • traMADol (ULTRAM) 50 MG tablet Take 1 tablet by mouth Every 8 (Eight) Hours As Needed for Moderate Pain . 9 tablet 0 Past Month at Unknown time       History  Past Medical History:   Diagnosis Date   • Acute UTI (urinary tract infection) 8/23/2019   • Disease of thyroid gland    • Rhabdomyolysis 8/23/2019   • TIA (transient ischemic attack)    ,   Past Surgical History:   Procedure Laterality Date   • ANKLE SURGERY     • BACK SURGERY     • FRACTURE SURGERY     • HERNIA REPAIR     ,    "  Family History   Problem Relation Age of Onset   • No Known Problems Mother    • No Known Problems Father    ,   Social History     Tobacco Use   • Smoking status: Never Smoker   • Smokeless tobacco: Never Used   Substance Use Topics   • Alcohol use: No   • Drug use: No    and Allergies:  Aspirin and Sulfa antibiotics,    Vital Signs   Blood pressure (!) 160/104, pulse 95, temperature 98.1 °F (36.7 °C), temperature source Oral, resp. rate 16, height 160 cm (63\"), weight 47.6 kg (105 lb), SpO2 94 %.  Body mass index is 18.6 kg/m².    Physical Exam:   General: Elderly white female in no distress    Blood pressure has ranged from 175/112 to a low of 144/99.  Most of his been in the 160/110 range.              Head: No evident trauma              Neck: No bruit              Resp: Normal breath sounds              Cor: Regular rhythm              Extremities: No edema              Skin: Warm and dry              Neuro: Mentally she demonstrates a significant short-term memory issues and tends to repeat herself again and again.    Speech is soft and articulate with no word finding problems.    Coordination is normal on finger-nose testing and fine finger movements.    Cranial nerves show a dilated left pupil which appears to be related to cataract surgery.  Eye movements are full visual fields appear full.    Facial movement and sensation are normal.    Palate elevates normally tongue protrudes normally.    Reflexes are plus minus bilaterally.    Motor testing shows symmetrical strength and tone in all muscle groups.    Sensory testing shows absence of vibration sense from the knees down.  Is present in the hand.        Results Review: MRI shows small recent infarcts in both the right and left hemispheres.        Labs:  Lab Results (last 72 hours)     Procedure Component Value Units Date/Time    TSH [[id number]]  (Normal) Collected:  02/06/20 0419    Specimen:  Blood Updated:  02/06/20 0510     TSH 3.920 uIU/mL     " Magnesium [[id number]]  (Normal) Collected:  02/06/20 0419    Specimen:  Blood Updated:  02/06/20 0459     Magnesium 2.2 mg/dL     Lipid Panel [[id number]] Collected:  02/06/20 0419    Specimen:  Blood Updated:  02/06/20 0459     Total Cholesterol 123 mg/dL      Triglycerides 73 mg/dL      HDL Cholesterol 46 mg/dL      LDL Cholesterol  62 mg/dL      VLDL Cholesterol 14.6 mg/dL      LDL/HDL Ratio 1.36    Narrative:       Cholesterol Reference Ranges  (U.S. Department of Health and Human Services ATP III Classifications)    Desirable          <200 mg/dL  Borderline High    200-239 mg/dL  High Risk          >240 mg/dL      Triglyceride Reference Ranges  (U.S. Department of Health and Human Services ATP III Classifications)    Normal           <150 mg/dL  Borderline High  150-199 mg/dL  High             200-499 mg/dL  Very High        >500 mg/dL    HDL Reference Ranges  (U.S. Department of Health and Human Services ATP III Classifcations)    Low     <40 mg/dl (major risk factor for CHD)  High    >60 mg/dl ('negative' risk factor for CHD)        LDL Reference Ranges  (U.S. Department of Health and Human Services ATP III Classifcations)    Optimal          <100 mg/dL  Near Optimal     100-129 mg/dL  Borderline High  130-159 mg/dL  High             160-189 mg/dL  Very High        >189 mg/dL    Basic Metabolic Panel [[id number]]  (Abnormal) Collected:  02/06/20 0419    Specimen:  Blood Updated:  02/06/20 0459     Glucose 112 mg/dL      BUN 18 mg/dL      Creatinine 0.89 mg/dL      Sodium 140 mmol/L      Potassium 3.9 mmol/L      Chloride 108 mmol/L      CO2 22.0 mmol/L      Calcium 8.7 mg/dL      eGFR Non African Amer 60 mL/min/1.73      BUN/Creatinine Ratio 20.2     Anion Gap 10.0 mmol/L     Narrative:       GFR Normal >60  Chronic Kidney Disease <60  Kidney Failure <15      Hemoglobin A1c [[id number]]  (Normal) Collected:  02/06/20 0419    Specimen:  Blood Updated:  02/06/20 0454     Hemoglobin A1C 5.20 %     Narrative:        Hemoglobin A1C Ranges:    Increased Risk for Diabetes  5.7% to 6.4%  Diabetes                     >= 6.5%  Diabetic Goal                < 7.0%    CBC (No Diff) [[id number]]  (Normal) Collected:  02/06/20 0419    Specimen:  Blood Updated:  02/06/20 0443     WBC 7.63 10*3/mm3      RBC 4.16 10*6/mm3      Hemoglobin 12.9 g/dL      Hematocrit 39.8 %      MCV 95.7 fL      MCH 31.0 pg      MCHC 32.4 g/dL      RDW 12.9 %      RDW-SD 45.2 fl      MPV 9.1 fL      Platelets 198 10*3/mm3     Blood Culture - Blood, Arm, Right [[id number]] Collected:  02/05/20 1530    Specimen:  Blood from Arm, Right Updated:  02/06/20 0415     Blood Culture No growth at less than 24 hours    Blood Culture - Blood, Arm, Left [[id number]] Collected:  02/05/20 1540    Specimen:  Blood from Arm, Left Updated:  02/06/20 0415     Blood Culture No growth at less than 24 hours    POC Glucose Once [[id number]]  (Normal) Collected:  02/06/20 0018    Specimen:  Blood Updated:  02/06/20 0019     Glucose 78 mg/dL     POC Glucose Once [[id number]]  (Normal) Collected:  02/05/20 1812    Specimen:  Blood Updated:  02/05/20 1813     Glucose 81 mg/dL     Lactic Acid, Plasma [[id number]]  (Normal) Collected:  02/05/20 1512    Specimen:  Blood Updated:  02/05/20 1545     Lactate 0.9 mmol/L      Comment: Falsely depressed results may occur on samples drawn from patients receiving N-Acetylcysteine (NAC) or Metamizole.       Urinalysis With Microscopic If Indicated (No Culture) - Urine, Catheter [[id number]]  (Abnormal) Collected:  02/05/20 1529    Specimen:  Urine, Catheter Updated:  02/05/20 1543     Color, UA Yellow     Appearance, UA Clear     pH, UA 6.0     Specific Gravity, UA 1.015     Glucose, UA Negative     Ketones, UA Trace     Bilirubin, UA Negative     Blood, UA Negative     Protein, UA Negative     Leuk Esterase, UA Negative     Nitrite, UA Negative     Urobilinogen, UA 1.0 E.U./dL    Narrative:       Urine microscopic not indicated.    [place] Draw [[id number]]  Collected:  02/05/20 1403    Specimen:  Blood Updated:  02/05/20 1516    Narrative:       The following orders were created for panel order [place] Draw.  Procedure                               Abnormality         Status                     ---------                               -----------         ------                     Light Blue Top[[id number]]                                   Final result               Green Top (Gel)[[id number]]                                  Final result               Lavender Top[[id number]]                                     Final result               Gold Top - SST[[id number]]                                   Final result                 Please view results for these tests on the individual orders.    Light Blue Top [[id number]] Collected:  02/05/20 1403    Specimen:  Blood Updated:  02/05/20 1516     Extra Tube hold for add-on     Comment: Auto resulted       Green Top (Gel) [[id number]] Collected:  02/05/20 1403    Specimen:  Blood Updated:  02/05/20 1516     Extra Tube Hold for add-ons.     Comment: Auto resulted.       Lavender Top [[id number]] Collected:  02/05/20 1403    Specimen:  Blood Updated:  02/05/20 1516     Extra Tube hold for add-on     Comment: Auto resulted       Gold Top - SST [[id number]] Collected:  02/05/20 1403    Specimen:  Blood Updated:  02/05/20 1516     Extra Tube Hold for add-ons.     Comment: Auto resulted.       CBC & Differential [[id number]] Collected:  02/05/20 1403    Specimen:  Blood Updated:  02/05/20 1500    Narrative:       The following orders were created for panel order CBC & Differential.  Procedure                               Abnormality         Status                     ---------                               -----------         ------                     CBC Auto Differential[[id number]]        Abnormal            Final result                 Please view results for these tests on the individual orders.    CBC Auto Differential [[id number]]   (Abnormal) Collected:  02/05/20 1403    Specimen:  Blood Updated:  02/05/20 1500     WBC 9.26 10*3/mm3      RBC 4.51 10*6/mm3      Hemoglobin 13.7 g/dL      Hematocrit 42.6 %      MCV 94.5 fL      MCH 30.4 pg      MCHC 32.2 g/dL      RDW 12.9 %      RDW-SD 44.9 fl      MPV 9.2 fL      Platelets 225 10*3/mm3      Neutrophil % 71.6 %      Lymphocyte % 19.0 %      Monocyte % 6.3 %      Eosinophil % 2.5 %      Basophil % 0.3 %      Immature Grans % 0.3 %      Neutrophils, Absolute 6.63 10*3/mm3      Lymphocytes, Absolute 1.76 10*3/mm3      Monocytes, Absolute 0.58 10*3/mm3      Eosinophils, Absolute 0.23 10*3/mm3      Basophils, Absolute 0.03 10*3/mm3      Immature Grans, Absolute 0.03 10*3/mm3      nRBC 0.0 /100 WBC     Scan Slide [[id number]]  (Normal) Collected:  02/05/20 1403    Specimen:  Blood Updated:  02/05/20 1500     RBC Morphology Normal     WBC Morphology Normal     Platelet Morphology Normal    BNP [[id number]]  (Normal) Collected:  02/05/20 1403    Specimen:  Blood Updated:  02/05/20 1458     proBNP 201.2 pg/mL     Narrative:       Among patients with dyspnea, NT-proBNP is highly sensitive for the detection of acute congestive heart failure. In addition NT-proBNP of <300 pg/ml effectively rules out acute congestive heart failure with 99% negative predictive value.    Results may be falsely decreased if patient taking Biotin.      Comprehensive Metabolic Panel [[id number]] Collected:  02/05/20 1403    Specimen:  Blood Updated:  02/05/20 1445     Glucose 90 mg/dL      BUN 17 mg/dL      Creatinine 0.85 mg/dL      Sodium 140 mmol/L      Potassium 4.0 mmol/L      Chloride 104 mmol/L      CO2 26.0 mmol/L      Calcium 9.0 mg/dL      Total Protein 6.9 g/dL      Albumin 3.70 g/dL      ALT (SGPT) 8 U/L      AST (SGOT) 17 U/L      Alkaline Phosphatase 72 U/L      Total Bilirubin 0.6 mg/dL      eGFR Non African Amer 63 mL/min/1.73      Globulin 3.2 gm/dL      A/G Ratio 1.2 g/dL      BUN/Creatinine Ratio 20.0     Anion  Gap 10.0 mmol/L     Narrative:       GFR Normal >60  Chronic Kidney Disease <60  Kidney Failure <15      Magnesium [[id number]]  (Normal) Collected:  02/05/20 1403    Specimen:  Blood Updated:  02/05/20 1445     Magnesium 2.3 mg/dL     Troponin [[id number]]  (Normal) Collected:  02/05/20 1403    Specimen:  Blood Updated:  02/05/20 1445     Troponin T 0.020 ng/mL     Narrative:       Troponin T Reference Range:  <= 0.03 ng/mL-   Negative for AMI  >0.03 ng/mL-     Abnormal for myocardial necrosis.  Clinicians would have to utilize clinical acumen, EKG, Troponin and serial changes to determine if it is an Acute Myocardial Infarction or myocardial injury due to an underlying chronic condition.       Results may be falsely decreased if patient taking Biotin.            Rads:  Imaging Results (Last 72 Hours)     Procedure Component Value Units Date/Time    CT Head Without Contrast [[id number]] Collected:  02/05/20 1553     Updated:  02/06/20 0848    Narrative:       EXAMINATION: CT HEAD WO CONTRAST - 02/05/2020     INDICATION: Weakness. Unable to walk.     TECHNIQUE: Unenhanced CT imaging of the head was performed with  additional coronal reformatted imaging provided for review.     The radiation dose reduction device was turned on for each scan per the  ALARA (As Low as Reasonably Achievable) protocol.     COMPARISON: CT of the head 8/23/2019     FINDINGS: Unenhanced CT imaging of the head demonstrates no evidence for  midline shift or mass effect. No hydrocephalus. No extra-axial fluid  collection or hemorrhage is identified. Chronic microvascular ischemic  changes identified similar to prior. There is no convincing evidence for  acute transcortical infarct identified as visualized. The visualized  calvarium appears intact. No evidence of depressed skull fracture.  Paranasal sinuses appear clear. Evaluation of the coronal images  demonstrate no convincing evidence for extra-axial fluid collection.  Calvarium is intact. The  surrounding soft tissues are unrevealing.  Postsurgical changes to the globes are identified. Visualized upper  oropharynx is not reveal acute abnormality or lesion.       Impression:       Chronic age-related changes of the brain without evidence  for acute abnormality appreciated.     DICTATED:   02/05/2020  EDITED/ls :   02/05/2020      This report was finalized on 2/6/2020 8:45 AM by Dr. Senthil Campbell MD.       XR Chest 1 View [[id number]] Collected:  02/05/20 1356     Updated:  02/06/20 0847    Narrative:       EXAMINATION: XR CHEST 1 VW- 02/05/2020     INDICATION: Weak/Dizzy/AMS triage protocol      COMPARISON: Chest radiograph 08/23/2019     FINDINGS: Single portable chest radiograph was submitted for review. The  patient is markedly side bent rotated, similar to prior exam. Heart is  not enlarged.  Atherosclerotic calcification of the aorta identified.  Chronic appearing changes in the lungs are identified without convincing  evidence of active airspace disease. No pleural effusion or  pneumothorax. Visualized upper abdomen is unrevealing. Thoracic spinal  curvature is again noted similar to prior. Vertebroplasty cement is  noted. Diffuse osteopenia and degenerative changes of the shoulders  identified.           Impression:       Chronic appearing changes of the chest without convincing  evidence for active airspace disease.     D:  02/05/2020  E:  02/05/2020     This report was finalized on 2/6/2020 8:43 AM by Dr. Senthil Campbell MD.       MRI Brain Without Contrast [[id number]] Collected:  02/05/20 1939     Updated:  02/05/20 1941    Narrative:       INDICATION:    Weakness, slurred speech that started 3 days ago. Patient feels weak with standing. History of TIA.    TECHNIQUE:   MRI of the brain without contrast.    COMPARISON:    Head CT from 8/23/2019 is available for review. Head CT from earlier this evening is not currently available for review.    FINDINGS:  There are punctate areas of restricted  diffusion involving the bilateral centrum semiovale. This includes a 5 mm lesion in the left superior parietal white matter and a 4 mm lesion in the right posterior frontal white matter. These tiny recent infarcts  could be due to small vessel disease or could be due to a subtle watershed insult. There is generalized atrophy. There is extensive pre-existing white matter disease as well as pre-existing bilateral basal ganglionic and thalamic lacunar insults. These  findings favor recent small vessel insults to the bilateral centrum semiovale. Additionally there is moderate signal abnormality in the bilateral kobi which is likely due to small vessel disease. There is no extra-axial fluid collection or intracranial  mass effect. There is punctate susceptibility in the left posterior superior insula consistent with remote blood product or mineral deposition. No recent hemorrhage is suspected. No intracranial mass effect. No hydrocephalus. The major arterial  intracranial flow voids are maintained. The mastoid air cells and visualized paranasal sinuses are clear.      Impression:         1. There are 2 very small areas of recent infarct involving the bilateral centrum semiovale. These are likely tiny recent small vessel insults superimposed upon a background of extensive pre-existing small vessel disease. There is no evidence for mass  effect or hemorrhagic transformation.    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    Poorly controlled high blood pressure.    Idiopathic peripheral neuropathy    Mild cognitive impairment versus early Alzheimer's disease     Plan:    Cardiology reevaluation.    Blood pressure control.        Comment:   Differential diagnosis in this case is extensive.    Certainly this could be manifestations of  accelerated hypertension.  Also conceivable that she had an emboli shower although the MRI scan does not suggest sufficient damage to cause the problems she complains of.    She needs to be checked for urinary tract infection as well.    Prognosis is guarded given her age..    Decision on her previous exam was made to not put her hands on anticoagulation in view of her age and frequent falling, this complicates treatment issues.    I discussed the patients findings and my recommendations with patient and nursing staff      Dion Stauffer MD  02/06/20  10:37 AM

## 2020-02-06 NOTE — THERAPY EVALUATION
Patient Name: Amber Thurman  : 3/13/1930    MRN: [id number]                              Today's Date: 2020       Admit Date: 2020    Visit Dx:     ICD-10-CM ICD-9-CM   1. Generalized weakness R53.1 780.79   2. At risk for falls Z91.81 V15.88   3. Impaired mobility and ADLs Z74.09 799.89     Patient Active Problem List   Diagnosis   • Atrial flutter (CMS/HCC)   • Hypothyroidism (acquired)   • Generalized weakness   • CVA (cerebral vascular accident) (CMS/HCC)     Past Medical History:   Diagnosis Date   • Acute UTI (urinary tract infection) 2019   • Disease of thyroid gland    • Rhabdomyolysis 2019   • TIA (transient ischemic attack)      Past Surgical History:   Procedure Laterality Date   • ANKLE SURGERY     • BACK SURGERY     • FRACTURE SURGERY     • HERNIA REPAIR       General Information     Row Name 20          PT Evaluation Time/Intention    Document Type  evaluation  -CD     Mode of Treatment  physical therapy  -CD     Row Name 20          General Information    Patient Profile Reviewed?  yes  -CD     Prior Level of Function  independent:;all household mobility;gait;transfer;bed mobility;mod assist:;dressing;bathing PT LIVES ALONE BUT HAS FAMILY CLOSEBY. PT USES A REG ROLLING WALKER AT ALL TIMES.   -CD     Existing Precautions/Restrictions  fall  -CD     Barriers to Rehab  none identified  -CD     Row Name 20          Relationship/Environment    Lives With  alone  -CD     Row Name 20          Resource/Environmental Concerns    Current Living Arrangements  home/apartment/condo  -CD     Row Name 20          Home Main Entrance    Number of Stairs, Main Entrance  two  -CD     Row Name 20          Cognitive Assessment/Intervention- PT/OT    Orientation Status (Cognition)  oriented to;person;place  -CD     Row Name 20          Safety Issues, Functional Mobility    Safety Issues Affecting Function (Mobility)  safety  precaution awareness;safety precautions follow-through/compliance  -CD     Impairments Affecting Function (Mobility)  balance;strength;endurance/activity tolerance  -CD     Comment, Safety Issues/Impairments (Mobility)  PT IS FEARFUL OF FALLING.   -CD       User Key  (r) = Recorded By, (t) = Taken By, (c) = Cosigned By    Initials Name Provider Type    Brittni Abraham PT Physical Therapist        Mobility     Row Name 02/06/20 0940          Bed Mobility Assessment/Treatment    Comment (Bed Mobility)  PT UIC.   -CD     Row Name 02/06/20 0940          Transfer Assessment/Treatment    Comment (Transfers)  CUES FOR HAND PLACEMENT WITH R WALKER.   -CD     Row Name 02/06/20 0940          Sit-Stand Transfer    Sit-Stand [place] (Transfers)  minimum assist (75% patient effort)  -CD     Assistive Device (Sit-Stand Transfers)  walker, front-wheeled  -CD     Row Name 02/06/20 0940          Gait/Stairs Assessment/Training    Gait/Stairs Assessment/Training  gait/ambulation independence  -CD     [place] Level (Gait)  minimum assist (75% patient effort)  -CD     Assistive Device (Gait)  walker, front-wheeled  -CD     Distance in Feet (Gait)  80 FEET.   -CD     Deviations/Abnormal Patterns (Gait)  gait speed decreased;stride length decreased;dwayne decreased  -CD     Bilateral Gait Deviations  forward flexed posture  -CD     Left Sided Gait Deviations  heel strike decreased  -CD     Comment (Gait/Stairs)  PT IS SIGNIFICANTLY FORWARD FLEXED WITH KYPHOTIC POSTURE. NEEDS SHOES FROM HOME FOR INCREASED STABILITY WITH GAIT.   -CD       User Key  (r) = Recorded By, (t) = Taken By, (c) = Cosigned By    Initials Name Provider Type    Brittni Abraham PT Physical Therapist        Obj/Interventions     Row Name 02/06/20 0942          General ROM    GENERAL ROM COMMENTS  B LE AROM WFL'S   -CD     Row Name 02/06/20 0942          MMT (Manual Muscle Testing)    General MMT Comments  B HIP FLEX 4/5, R KNEE EXT 3/5, L KNEE EXT 4/5,  R DF 3/5, L DF 4/5.   -CD     Row Name 02/06/20 0942          Therapeutic Exercise    Lower Extremity (Therapeutic Exercise)  marching while seated;LAQ (long arc quad), bilateral  -CD     Exercise Type (Therapeutic Exercise)  AROM (active range of motion)  -CD     Position (Therapeutic Exercise)  seated  -CD     Sets/Reps (Therapeutic Exercise)  1 SET OF 10 REPS.   -CD     Row Name 02/06/20 0942          Static Sitting Balance    Level of [place] (Unsupported Sitting, Static Balance)  supervision  -CD     Sitting Position (Unsupported Sitting, Static Balance)  sitting in chair EDGE  -CD     Row Name 02/06/20 0942          Dynamic Sitting Balance    Level of [place], Reaches Outside Midline (Sitting, Dynamic Balance)  contact guard assist  -CD     Sitting Position, Reaches Outside Midline (Sitting, Dynamic Balance)  sitting in chair EDGE  -CD     Row Name 02/06/20 0942          Static Standing Balance    Level of [place] (Supported Standing, Static Balance)  contact guard assist  -CD     Assistive Device Utilized (Supported Standing, Static Balance)  walker, rolling  -CD     Row Name 02/06/20 0942          Dynamic Standing Balance    Level of [place], Reaches Outside Midline (Standing, Dynamic Balance)  minimal assist, 75% patient effort  -CD     Assistive Device Utilized (Supported Standing, Dynamic Balance)  walker, rolling  -CD     Row Name 02/06/20 0942          Sensory Assessment/Intervention    Sensory General Assessment  -- PERIPHERAL NEUROPATHY B LE'S AT BASELINE.   -CD       User Key  (r) = Recorded By, (t) = Taken By, (c) = Cosigned By    Initials Name Provider Type    CD Brittni Dykes, PT Physical Therapist        Goals/Plan     Row Name 02/06/20 0952          Bed Mobility Goal 1 (PT)    Activity/Assistive Device (Bed Mobility Goal 1, PT)  bed mobility activities, all  -CD     [place] Level/Cues Needed (Bed Mobility Goal 1, PT)  independent  -CD     Time Frame (Bed Mobility Goal  1, PT)  long term goal (LTG);2 weeks  -CD     Row Name 02/06/20 0952          Transfer Goal 1 (PT)    Activity/Assistive Device (Transfer Goal 1, PT)  sit-to-stand/stand-to-sit;bed-to-chair/chair-to-bed;walker, rolling  -CD     [place] Level/Cues Needed (Transfer Goal 1, PT)  supervision required  -CD     Time Frame (Transfer Goal 1, PT)  long term goal (LTG);2 weeks  -CD     Row Name 02/06/20 0952          Gait Training Goal 1 (PT)    Activity/Assistive Device (Gait Training Goal 1, PT)  gait (walking locomotion);walker, rolling  -CD     [place] Level (Gait Training Goal 1, PT)  supervision required  -CD     Time Frame (Gait Training Goal 1, PT)  long term goal (LTG);2 weeks  -CD       User Key  (r) = Recorded By, (t) = Taken By, (c) = Cosigned By    Initials Name Provider Type    CD Brittni Dykes, PT Physical Therapist        Clinical Impression     Row Name 02/06/20 0944          Pain Assessment    Additional Documentation  Pain Scale: FACES Pre/Post-Treatment (Group)  -CD     Row Name 02/06/20 0944          Pain Scale: Numbers Pre/Post-Treatment    Pain Location - Side  Right  -CD     Pain Location - Orientation  lower  -CD     Pain Location  back  -CD     Pain Intervention(s)  Ambulation/increased activity;Repositioned  -CD     Row Name 02/06/20 0944          Pain Scale: FACES Pre/Post-Treatment    Pain: FACES Scale, Pretreatment  0-->no hurt  -CD     Pain: FACES Scale, Post-Treatment  0-->no hurt INTRA 4/10 WITH SCOOTING IN RECLINER.   -CD     Pre/Post Treatment Pain Comment  TOLERATED GAIT IN [place].   -CD     Row Name 02/06/20 0944          Plan of Care Review    Plan of Care Reviewed With  patient  -CD     Outcome Summary  PT PRESENTS WITH EVOLVING SYMPTOMS TO INCLUDE GENERALIZED WEAKNESS, R>LLE, IMPAIRED BALANCE AND DECLINE IN FUNCTIONAL MOBILITY. PT REQUIRED MIN ASSIST FOR STS AND GAIT X 80 FEET WITH R WALKER. PT IS FEARFUL OF FALLING. RECOMMEND SHORT IRF STAY OR HOME WITH 24/7 ASSIST WITH  MOBILITY UNTIL DEEMED SAFE BY HHPT.   -CD     Row Name 02/06/20 0944          Physical Therapy Clinical Impression    Patient/Family Goals Statement (PT Clinical Impression)  WANTS TO GET STRONGER. PT IS CONSIDERING IRF AT D/C. TO DISCUSS WITH FAMILY.   -CD     Criteria for Skilled Interventions Met (PT Clinical Impression)  yes  -CD     Rehab Potential (PT Clinical Summary)  good, to achieve stated therapy goals  -CD     Predicted Duration of Therapy (PT)  2 WEEKS   -CD     Row Name 02/06/20 0944          Vital Signs    Pre Systolic BP Rehab  139  -CD     Pre Treatment Diastolic BP  108  -CD     Post Systolic BP Rehab  136  -CD     Post Treatment Diastolic BP  71  -CD     Pretreatment Heart Rate (beats/min)  108  -CD     Posttreatment Heart Rate (beats/min)  112  -CD     Pre SpO2 (%)  95  -CD     O2 Delivery Pre Treatment  room air  -CD     Post SpO2 (%)  95  -CD     O2 Delivery Post Treatment  room air  -CD     Pre Patient Position  Sitting  -CD     Intra Patient Position  Standing  -CD     Post Patient Position  Sitting  -CD     Row Name 02/06/20 0944          Positioning and Restraints    Pre-Treatment Position  sitting in chair/recliner  -CD     Post Treatment Position  chair  -CD     In Chair  reclined;call light within reach;encouraged to call for assist;exit alarm on;RUE elevated;LUE elevated;legs elevated;notified nsg  -CD       User Key  (r) = Recorded By, (t) = Taken By, (c) = Cosigned By    Initials Name Provider Type    CD Brittni Dykes, PT Physical Therapist        Outcome Measures     Row Name 02/06/20 0953          How much help from another person do you currently need...    Turning from your back to your side while in flat bed without using bedrails?  3  -CD     Moving from lying on back to sitting on the side of a flat bed without bedrails?  3  -CD     Moving to and from a bed to a chair (including a wheelchair)?  3  -CD     Standing up from a chair using your arms (e.g., wheelchair, bedside  chair)?  3  -CD     Climbing 3-5 steps with a railing?  2  -CD     To walk in hospital room?  3  -CD     AM-PAC 6 Clicks Score (PT)  17  -CD     Row Name 02/06/20 0953          Modified [place] Scale    Modified [place] Scale  3 - Moderate disability.  Requiring some help, but able to walk without assistance. WITH R WALKER  -CD     Row Name 02/06/20 0953          Functional Assessment    Outcome Measure Options  AM-PAC 6 Clicks Basic Mobility (PT);Modified [place]  -CD       User Key  (r) = Recorded By, (t) = Taken By, (c) = Cosigned By    Initials Name Provider Type    Brittni Abraham, PT Physical Therapist          PT Recommendation and Plan  Planned Therapy Interventions (PT Eval): balance training, bed mobility training, gait training, home exercise program, transfer training, strengthening  Outcome Summary/Treatment Plan (PT)  Anticipated Discharge Disposition (PT): inpatient rehabilitation facility  Plan of Care Reviewed With: patient  Outcome Summary: PT PRESENTS WITH EVOLVING SYMPTOMS TO INCLUDE GENERALIZED WEAKNESS, R>LLE, IMPAIRED BALANCE AND DECLINE IN FUNCTIONAL MOBILITY. PT REQUIRED MIN ASSIST FOR STS AND GAIT X 80 FEET WITH R WALKER. PT IS FEARFUL OF FALLING. RECOMMEND SHORT IRF STAY OR HOME WITH 24/7 ASSIST WITH MOBILITY UNTIL DEEMED SAFE BY HHPT.      Time Calculation:   PT Charges     Row Name 02/06/20 0955             Time Calculation    Start Time  0850  -CD      PT Received On  02/06/20  -      PT Goal Re-Cert Due Date  02/16/20  -CD        User Key  (r) = Recorded By, (t) = Taken By, (c) = Cosigned By    Initials Name Provider Type    Brittni Abraham PT Physical Therapist        Therapy Charges for Today     Code Description Service Date Service Provider Modifiers Qty    [id number]  PT EVAL LOW COMPLEXITY 4 2/6/2020 Brittni Dykes, PT GP 1          PT G-Codes  Outcome Measure Options: AM-PAC 6 Clicks Basic Mobility (PT), Modified [place]  AM-PAC 6 Clicks Score (PT): 17  Modified [place]  Scale: 3 - Moderate disability.  Requiring some help, but able to walk without assistance.(WITH R ISAURA)    Brittni Dykes, PT  2/6/2020

## 2020-02-06 NOTE — H&P
"    [facility] Medicine Services  HISTORY AND PHYSICAL    Patient Name: Amber Thurman  : 3/13/1930  MRN: [id number]  Primary Care Physician: Dinh Alan MD  Date of admission: 2020      Subjective   Subjective     Chief Complaint:  Weakness     HPI:  Amber Thurman is a 89 y.o. female w/ a hx of remote TIA, hypothyroidism, peripheral neuropathy and previous atrial flutter who presented to the ED w/ c/o generalized weakness.   Per pt's daughter-in-law, pt has had a significant physical decline since . Pt lives alone and is mostly independent at baseline. Pt does require assistance w/ bathing/showering which is provided by family. Pt's son and daughter in law live next door. The pt's grandson works from within the pt's home. On , the grandson noted that the patent was \"acting strangely\" and he reported possible slurred speech. The daughter in law stopped by pt's home on  evening and noted that pt was \"@ baseline\" w/ no slurred speech, unilateral weakness, etc. However, when pt went to stand up she was unable to 2/2 significant generalized weakness. Since  pt has had ongoing generalized weakness and at times is unable to ambulate due to this. She has called out several times during the night c/o needing to use the restroom but is unable to stand or ambulate to get to the bathroom. The family describes her as \"dead weight\" in these instances. The daughter in law held pt's nightly temazepam last night in hopes that it was the cause of her weakness but today pt had no improvement. Per family, pt has been at baseline mentally w/ no confusion noted.   Pt denies recent new medications, illness or hospitalizations. Pt denies fever/chills, chest pain, dyspnea, cough, N/V/D, abdominal pain, dysuria, edema, syncope, confusion, unilateral weakness, dysphagia, coughing w/ eating/drinking, visual changes, headache.   Pt evaluated in the ED. Labs and CT head " "unremarkable; MRI brain revealed \"2 very small areas of recent infarct involving the bilateral centrum semiovale\" . Pt admitted to the hospital medicine service for further evaluation.     **Pt w/ hx of admission to [facility] in 8/2019 2/2 fall. Pt was found to be in atrial flutter. Pt was given Lopressor and converted to NSR. Pt had no repeat episodes of atrial fib/flutter during her hospitalization. Pt not started on anticoagulation 2/2 hx of frequent falls.       Review of Systems   Constitutional: Negative.    HENT: Negative.    Eyes: Negative.    Respiratory: Negative.    Cardiovascular: Negative.    Gastrointestinal: Negative.    Endocrine: Negative.    Musculoskeletal: Positive for gait problem. Negative for arthralgias, back pain, joint swelling, myalgias, neck pain and neck stiffness.        Generalized weakness; acute onset Sunday afternoon. Unable to ambulate 2/2 generalized weakness.    Skin: Negative.    Allergic/Immunologic: Negative.    Hematological: Negative.    Psychiatric/Behavioral: Negative.    All other systems reviewed and are negative.       Personal History     Past Medical History:   Diagnosis Date   • Acute UTI (urinary tract infection) 8/23/2019   • Disease of thyroid gland    • Rhabdomyolysis 8/23/2019   • TIA (transient ischemic attack)        Past Surgical History:   Procedure Laterality Date   • ANKLE SURGERY     • BACK SURGERY     • FRACTURE SURGERY     • HERNIA REPAIR         Family History: family history includes No Known Problems in her father and mother. Otherwise pertinent FHx was reviewed and unremarkable.     Social History:  reports that she has never smoked. She has never used smokeless tobacco. She reports that she does not drink alcohol or use drugs.  Social History     Social History Narrative    Pt lives alone. Uses a walker to ambulate. Requires assistance w/ showering and bathing. Otherwise, pt independent w/ ADLs. Pt's son and daughter-in-law live next door and are very " involved in her care.        Medications:  Available home medication information reviewed.  Medications Prior to Admission   Medication Sig Dispense Refill Last Dose   • levothyroxine (SYNTHROID, LEVOTHROID) 75 MCG tablet Take 0.5 tablets by mouth Daily. 30 tablet 0 Past Month at Unknown time   • temazepam (RESTORIL) 15 MG capsule Take 1 capsule by mouth At Night As Needed for Sleep. 3 capsule 0 Past Month at Unknown time   • traMADol (ULTRAM) 50 MG tablet Take 1 tablet by mouth Every 8 (Eight) Hours As Needed for Moderate Pain . 9 tablet 0 Past Month at Unknown time       Allergies   Allergen Reactions   • Aspirin Rash   • Sulfa Antibiotics Rash       Objective   Objective     Vital Signs:   Temp:  [97.9 °F (36.6 °C)-98.3 °F (36.8 °C)] 98 °F (36.7 °C)  Heart Rate:  [74-99] 74  Resp:  [16-18] 16  BP: (145-175)/() 149/93   Total (NIH Stroke Scale): 2    Physical Exam   Constitutional: She is oriented to person, place, and time. She appears well-developed and well-nourished. No distress.   HENT:   Head: Normocephalic and atraumatic.   Eyes: Pupils are equal, round, and reactive to light. EOM are normal.   Neck: Normal range of motion. Neck supple.   Cardiovascular: Normal rate, regular rhythm, normal heart sounds and intact distal pulses. Exam reveals no gallop and no friction rub.   No murmur heard.  Pulmonary/Chest: Effort normal and breath sounds normal. No stridor. No respiratory distress. She has no wheezes. She has no rales. She exhibits no tenderness.   Abdominal: Soft. Bowel sounds are normal. She exhibits no distension. There is no tenderness. There is no guarding.   Musculoskeletal: Normal range of motion. She exhibits no edema, tenderness or deformity.   Neurological: She is alert and oriented to person, place, and time. No cranial nerve deficit.   Pt oriented and alert. Mild confusion about current year/month (baseline per family). No focal deficits noted. No unilateral weakness, facial droop or  slurred speech.    Skin: Skin is warm and dry. Capillary refill takes more than 3 seconds. No rash noted. She is not diaphoretic. No erythema. No pallor.   Psychiatric: She has a normal mood and affect. Her behavior is normal. Judgment and thought content normal.   Nursing note and vitals reviewed.       Results Reviewed:  I have personally reviewed current lab and radiology data.    Results from last 7 days   Lab Units 02/05/20  1403   WBC 10*3/mm3 9.26   HEMOGLOBIN g/dL 13.7   HEMATOCRIT % 42.6   PLATELETS 10*3/mm3 225     Results from last 7 days   Lab Units 02/05/20  1512 02/05/20  1403   SODIUM mmol/L  --  140   POTASSIUM mmol/L  --  4.0   CHLORIDE mmol/L  --  104   CO2 mmol/L  --  26.0   BUN mg/dL  --  17   CREATININE mg/dL  --  0.85   GLUCOSE mg/dL  --  90   CALCIUM mg/dL  --  9.0   ALT (SGPT) U/L  --  8   AST (SGOT) U/L  --  17   TROPONIN T ng/mL  --  0.020   PROBNP pg/mL  --  201.2   LACTATE mmol/L 0.9  --      Estimated Creatinine Clearance: 31.8 mL/min (by C-G formula based on SCr of 0.85 mg/dL).  Brief Urine Lab Results  (Last result in the past 365 days)      Color   Clarity   Blood   Leuk Est   Nitrite   Protein   CREAT   Urine HCG        02/05/20 1529 Yellow Clear Negative Negative Negative Negative             Imaging Results (Last 24 Hours)     Procedure Component Value Units Date/Time    MRI Brain Without Contrast [[id number]] Collected:  02/05/20 1939     Updated:  02/05/20 1941    Narrative:       INDICATION:    Weakness, slurred speech that started 3 days ago. Patient feels weak with standing. History of TIA.    TECHNIQUE:   MRI of the brain without contrast.    COMPARISON:    Head CT from 8/23/2019 is available for review. Head CT from earlier this evening is not currently available for review.    FINDINGS:  There are punctate areas of restricted diffusion involving the bilateral centrum semiovale. This includes a 5 mm lesion in the left superior parietal white matter and a 4 mm lesion in the  right posterior frontal white matter. These tiny recent infarcts  could be due to small vessel disease or could be due to a subtle watershed insult. There is generalized atrophy. There is extensive pre-existing white matter disease as well as pre-existing bilateral basal ganglionic and thalamic lacunar insults. These  findings favor recent small vessel insults to the bilateral centrum semiovale. Additionally there is moderate signal abnormality in the bilateral kobi which is likely due to small vessel disease. There is no extra-axial fluid collection or intracranial  mass effect. There is punctate susceptibility in the left posterior superior insula consistent with remote blood product or mineral deposition. No recent hemorrhage is suspected. No intracranial mass effect. No hydrocephalus. The major arterial  intracranial flow voids are maintained. The mastoid air cells and visualized paranasal sinuses are clear.      Impression:         1. There are 2 very small areas of recent infarct involving the bilateral centrum semiovale. These are likely tiny recent small vessel insults superimposed upon a background of extensive pre-existing small vessel disease. There is no evidence for mass  effect or hemorrhagic transformation.    Signer Name: Aliza Zarate MD   Signed: 2/5/2020 7:39 PM   Workstation Name: [id number]    [facility]    CT Head Without Contrast [[id number]] Collected:  02/05/20 1553     Updated:  02/05/20 1611    Narrative:       EXAMINATION: CT HEAD WO CONTRAST - 02/05/2020     INDICATION: Weakness. Unable to walk.     TECHNIQUE: Unenhanced CT imaging of the head was performed with  additional coronal reformatted imaging provided for review.     The radiation dose reduction device was turned on for each scan per the  ALARA (As Low as Reasonably Achievable) protocol.     COMPARISON: CT of the head 8/23/2019     FINDINGS: Unenhanced CT imaging of the head demonstrates no evidence  for  midline shift or mass effect. No hydrocephalus. No extra-axial fluid  collection or hemorrhage is identified. Chronic microvascular ischemic  changes identified similar to prior. There is no convincing evidence for  acute transcortical infarct identified as visualized. The visualized  calvarium appears intact. No evidence of depressed skull fracture.  Paranasal sinuses appear clear. Evaluation of the coronal images  demonstrate no convincing evidence for extra-axial fluid collection.  Calvarium is intact. The surrounding soft tissues are unrevealing.  Postsurgical changes to the globes are identified. Visualized upper  oropharynx is not reveal acute abnormality or lesion.       Impression:       Chronic age-related changes of the brain without evidence  for acute abnormality appreciated.     DICTATED:   02/05/2020  EDITED/ls :   02/05/2020        XR Chest 1 View [[id number]] Collected:  02/05/20 1356     Updated:  02/05/20 1404    Narrative:       EXAMINATION: XR CHEST 1 VW- 02/05/2020     INDICATION: Weak/Dizzy/AMS triage protocol      COMPARISON: Chest radiograph 08/23/2019     FINDINGS: Single portable chest radiograph was submitted for review. The  patient is markedly side bent rotated, similar to prior exam. Heart is  not enlarged.  Atherosclerotic calcification of the aorta identified.  Chronic appearing changes in the lungs are identified without convincing  evidence of active airspace disease. No pleural effusion or  pneumothorax. Visualized upper abdomen is unrevealing. Thoracic spinal  curvature is again noted similar to prior. Vertebroplasty cement is  noted. Diffuse osteopenia and degenerative changes of the shoulders  identified.           Impression:       Chronic appearing changes of the chest without convincing  evidence for active airspace disease.     D:  02/05/2020  E:  02/05/2020              Results for orders placed during the hospital encounter of 08/23/19   Adult Transthoracic Echo  "Complete W/ Cont if Necessary Per Protocol    Narrative · Left ventricular systolic function is normal. Estimated EF appears to be   in the range of 66 - 70%.  · Sigmoid-shaped ventricular septum is present.  · There is moderate calcification of the aortic valve.Trace-to-mild aortic   valve regurgitation is present. No aortic valve stenosis is present  · Mild tricuspid valve regurgitation is present. Estimated right   ventricular systolic pressure from tricuspid regurgitation is normal (<35   mmHg).          Assessment/Plan   Assessment & Plan     Active Hospital Problems    Diagnosis POA   • **CVA (cerebral vascular accident) (CMS/HCC) [I63.9] Yes   • Generalized weakness [R53.1] Yes   • Hypothyroidism (acquired) [E03.9] Yes     Assessment & Plan    **CVA  -CT head negative, MRI brain obtained/reviewed  -baseline EKG reviewed; trend  -pt w/ ASA allergy- \"rash\"; not given  -lipid panel, tsh, hem a1c w/ am labs  -high dose statin to be given tonight  -neuro checks q 4 hrs, stroke scale q shift  -bilateral carotid duplex ordered  -ECHO ordered  -pt/ot/speech, case mgt consult in am  -bedside dysphagia evaluation  -s/p NS 500ml bolus given in ED; hold on further IVF and monitor I/Os  -Neurology consult in the am     **Generalized weakness  -plan per above  -fall precautions  -pt/ot and case mgt consult    **Hypothyroidism  -tsh w/ am labs  -continue routine synthroid     **Hx of atrial flutter  -per d/c summary- pt admitted to [facility] in 8/2019 2/2 fall; pt noted to be in aflutter per EKG in ED; pt given IV Lopressor and converted to NSR- remained in NSR throughout hospitalization; was not started on anticoagulation 2/2 fall risk; per pt and family- \"no hx of HTN, CAD, a.Fib or A.Flutter\"  -EKG reviewed; NSR  -repeat EKG in am  -continuous telemetry      DVT prophylaxis:  Mechanical     CODE STATUS:    Code Status and Medical Interventions:   Ordered at: 02/05/20 2123     Level Of Support Discussed With:    Patient     " Code Status:    CPR     Medical Interventions (Level of Support Prior to Arrest):    Full       Admission Status:  I believe this patient meets OBSERVATION status, however if further evaluation or treatment plans warrant, status may change.  Based upon current information, I predict patient's care encounter to be less than or equal to 2 midnights.      Electronically signed by ZEFERINO Queen, 02/05/20, 7:19 PM.      Attending   Admission Attestation       I have seen and examined the patient, performing an independent face-to-face diagnostic evaluation with plan of care reviewed and developed with the advanced practice clinician (APC).      Brief Summary Statement:   Amber Thurman is a 89 y.o. female w/ a hx of remote TIA, hypothyroidism, peripheral neuropathy and previous atrial flutter who presented to the ED w/ c/o generalized weakness. Initial CT head showed no acute changes. MRI shows 2 small areas of acute infarct. Pt out of window for tPA as she has had generalized weakness for several days.     Remainder of detailed HPI is as noted by APC and has been reviewed and/or edited by me for completeness.    Attending Physical Exam:  Constitutional: Awake, alert, NAD  Eyes: PERRLA, sclerae anicteric, no conjunctival injection  HENT: NCAT, mucous membranes moist  Neck: Supple, no thyromegaly, no lymphadenopathy, trachea midline  Respiratory: Clear to auscultation bilaterally, nonlabored respirations   Cardiovascular: RRR, +murmur, No rubs or gallops, palpable pedal pulses bilaterally  Gastrointestinal: Positive bowel sounds, soft, nontender, nondistended  Musculoskeletal: No bilateral ankle edema, no clubbing or cyanosis to extremities  Psychiatric: Appropriate affect, cooperative  Neurologic: Mentation at baseline, strength symmetric in all extremities, Cranial Nerves grossly intact to confrontation, speech clear  Skin: No rashes    Brief Assessment/Plan :  Will admit patient for further stroke work up. Echo  and carotid duplex. Has reported ASA allergy so not given. Will give high intensity statin. Neuro to see in AM. PT/OT/SLP.  See detailed assessment and plan developed with APC which I have reviewed and/or edited for completeness.    Electronically signed by Kellen Walker DO, 02/05/20, 10:16 PM.

## 2020-02-06 NOTE — PLAN OF CARE
Problem: Patient Care Overview  Goal: Plan of Care Review  Outcome: Ongoing (interventions implemented as appropriate)  Flowsheets (Taken 2/6/2020 8148)  Plan of Care Reviewed With: patient  Note:   SLP evaluation completed. Will continue to address cog-comm difficulties. Please see note for further details and recommendations.

## 2020-02-07 ENCOUNTER — APPOINTMENT (OUTPATIENT)
Dept: CARDIOLOGY | Facility: HOSPITAL | Age: 85
End: 2020-02-07

## 2020-02-07 LAB
ANION GAP SERPL CALCULATED.3IONS-SCNC: 15 MMOL/L (ref 5–15)
BH CV ECHO MEAS - AO MAX PG (FULL): 1.1 MMHG
BH CV ECHO MEAS - AO MAX PG: 3.6 MMHG
BH CV ECHO MEAS - AO MEAN PG (FULL): 0.62 MMHG
BH CV ECHO MEAS - AO MEAN PG: 2 MMHG
BH CV ECHO MEAS - AO ROOT AREA (BSA CORRECTED): 2.2
BH CV ECHO MEAS - AO ROOT AREA: 8.4 CM^2
BH CV ECHO MEAS - AO ROOT DIAM: 3.3 CM
BH CV ECHO MEAS - AO V2 MAX: 94.7 CM/SEC
BH CV ECHO MEAS - AO V2 MEAN: 65.6 CM/SEC
BH CV ECHO MEAS - AO V2 VTI: 14.5 CM
BH CV ECHO MEAS - AVA(I,A): 2.2 CM^2
BH CV ECHO MEAS - AVA(I,D): 2.2 CM^2
BH CV ECHO MEAS - AVA(V,A): 2.1 CM^2
BH CV ECHO MEAS - AVA(V,D): 2.1 CM^2
BH CV ECHO MEAS - BSA(HAYCOCK): 1.4 M^2
BH CV ECHO MEAS - BSA: 1.5 M^2
BH CV ECHO MEAS - BZI_BMI: 18.4 KILOGRAMS/M^2
BH CV ECHO MEAS - BZI_METRIC_HEIGHT: 160 CM
BH CV ECHO MEAS - BZI_METRIC_WEIGHT: 47.2 KG
BH CV ECHO MEAS - EDV(CUBED): 56.6 ML
BH CV ECHO MEAS - EDV(MOD-SP2): 22 ML
BH CV ECHO MEAS - EDV(MOD-SP4): 36 ML
BH CV ECHO MEAS - EDV(TEICH): 63.5 ML
BH CV ECHO MEAS - EF(CUBED): 94.1 %
BH CV ECHO MEAS - EF(MOD-BP): 68 %
BH CV ECHO MEAS - EF(MOD-SP2): 63.6 %
BH CV ECHO MEAS - EF(MOD-SP4): 75 %
BH CV ECHO MEAS - EF(TEICH): 90.5 %
BH CV ECHO MEAS - ESV(CUBED): 3.4 ML
BH CV ECHO MEAS - ESV(MOD-SP2): 8 ML
BH CV ECHO MEAS - ESV(MOD-SP4): 9 ML
BH CV ECHO MEAS - ESV(TEICH): 6 ML
BH CV ECHO MEAS - FS: 61 %
BH CV ECHO MEAS - IVS/LVPW: 1.1
BH CV ECHO MEAS - IVSD: 0.68 CM
BH CV ECHO MEAS - LA DIMENSION: 2.3 CM
BH CV ECHO MEAS - LA/AO: 0.71
BH CV ECHO MEAS - LAD MAJOR: 4.3 CM
BH CV ECHO MEAS - LAT PEAK E' VEL: 7 CM/SEC
BH CV ECHO MEAS - LATERAL E/E' RATIO: 5.4
BH CV ECHO MEAS - LV DIASTOLIC VOL/BSA (35-75): 24.6 ML/M^2
BH CV ECHO MEAS - LV MASS(C)D: 65.4 GRAMS
BH CV ECHO MEAS - LV MASS(C)DI: 44.7 GRAMS/M^2
BH CV ECHO MEAS - LV MAX PG: 2.5 MMHG
BH CV ECHO MEAS - LV MEAN PG: 1.4 MMHG
BH CV ECHO MEAS - LV SYSTOLIC VOL/BSA (12-30): 6.1 ML/M^2
BH CV ECHO MEAS - LV V1 MAX: 78.3 CM/SEC
BH CV ECHO MEAS - LV V1 MEAN: 54.6 CM/SEC
BH CV ECHO MEAS - LV V1 VTI: 13.1 CM
BH CV ECHO MEAS - LVIDD: 3.8 CM
BH CV ECHO MEAS - LVIDS: 1.5 CM
BH CV ECHO MEAS - LVLD AP2: 6.2 CM
BH CV ECHO MEAS - LVLD AP4: 6.2 CM
BH CV ECHO MEAS - LVLS AP2: 5.8 CM
BH CV ECHO MEAS - LVLS AP4: 5 CM
BH CV ECHO MEAS - LVOT AREA (M): 2.5 CM^2
BH CV ECHO MEAS - LVOT AREA: 2.5 CM^2
BH CV ECHO MEAS - LVOT DIAM: 1.8 CM
BH CV ECHO MEAS - LVPWD: 0.6 CM
BH CV ECHO MEAS - MED PEAK E' VEL: 6.1 CM/SEC
BH CV ECHO MEAS - MEDIAL E/E' RATIO: 6.2
BH CV ECHO MEAS - MV A MAX VEL: 74.5 CM/SEC
BH CV ECHO MEAS - MV DEC TIME: 0.33 SEC
BH CV ECHO MEAS - MV E MAX VEL: 38.6 CM/SEC
BH CV ECHO MEAS - MV E/A: 0.52
BH CV ECHO MEAS - PA ACC SLOPE: 426.8 CM/SEC^2
BH CV ECHO MEAS - PA ACC TIME: 0.1 SEC
BH CV ECHO MEAS - PA PR(ACCEL): 36.2 MMHG
BH CV ECHO MEAS - PULM DIAS VEL: 29.3 CM/SEC
BH CV ECHO MEAS - PULM S/D: 1.6
BH CV ECHO MEAS - PULM SYS VEL: 45.5 CM/SEC
BH CV ECHO MEAS - SI(AO): 82.9 ML/M^2
BH CV ECHO MEAS - SI(CUBED): 36.4 ML/M^2
BH CV ECHO MEAS - SI(LVOT): 22.3 ML/M^2
BH CV ECHO MEAS - SI(MOD-SP2): 9.6 ML/M^2
BH CV ECHO MEAS - SI(MOD-SP4): 18.4 ML/M^2
BH CV ECHO MEAS - SI(TEICH): 39.2 ML/M^2
BH CV ECHO MEAS - SV(AO): 121.5 ML
BH CV ECHO MEAS - SV(CUBED): 53.3 ML
BH CV ECHO MEAS - SV(LVOT): 32.7 ML
BH CV ECHO MEAS - SV(MOD-SP2): 14 ML
BH CV ECHO MEAS - SV(MOD-SP4): 27 ML
BH CV ECHO MEAS - SV(TEICH): 57.5 ML
BH CV ECHO MEAS - TR MAX PG: 10 MMHG
BH CV ECHO MEAS - TR MAX VEL: 159 CM/SEC
BH CV ECHO MEASUREMENTS AVERAGE E/E' RATIO: 5.89
BH CV XLRA - RV BASE: 3.9 CM
BH CV XLRA - RV LENGTH: 6 CM
BH CV XLRA - RV MID: 3.3 CM
BUN BLD-MCNC: 16 MG/DL (ref 8–23)
BUN/CREAT SERPL: 18.8 (ref 7–25)
CALCIUM SPEC-SCNC: 9.6 MG/DL (ref 8.6–10.5)
CHLORIDE SERPL-SCNC: 107 MMOL/L (ref 98–107)
CO2 SERPL-SCNC: 18 MMOL/L (ref 22–29)
CREAT BLD-MCNC: 0.85 MG/DL (ref 0.57–1)
GFR SERPL CREATININE-BSD FRML MDRD: 63 ML/MIN/1.73
GLUCOSE BLD-MCNC: 112 MG/DL (ref 65–99)
LEFT ATRIUM VOLUME INDEX: 15 ML/M^2
LEFT ATRIUM VOLUME: 22 ML
LV EF 2D ECHO EST: 65 %
POTASSIUM BLD-SCNC: 3.9 MMOL/L (ref 3.5–5.2)
SODIUM BLD-SCNC: 140 MMOL/L (ref 136–145)

## 2020-02-07 PROCEDURE — 97530 THERAPEUTIC ACTIVITIES: CPT

## 2020-02-07 PROCEDURE — 80048 BASIC METABOLIC PNL TOTAL CA: CPT | Performed by: INTERNAL MEDICINE

## 2020-02-07 PROCEDURE — 93306 TTE W/DOPPLER COMPLETE: CPT | Performed by: INTERNAL MEDICINE

## 2020-02-07 PROCEDURE — 97110 THERAPEUTIC EXERCISES: CPT

## 2020-02-07 PROCEDURE — 93306 TTE W/DOPPLER COMPLETE: CPT

## 2020-02-07 PROCEDURE — 93005 ELECTROCARDIOGRAM TRACING: CPT | Performed by: INTERNAL MEDICINE

## 2020-02-07 PROCEDURE — 97116 GAIT TRAINING THERAPY: CPT

## 2020-02-07 PROCEDURE — 99232 SBSQ HOSP IP/OBS MODERATE 35: CPT | Performed by: INTERNAL MEDICINE

## 2020-02-07 PROCEDURE — 93010 ELECTROCARDIOGRAM REPORT: CPT | Performed by: INTERNAL MEDICINE

## 2020-02-07 PROCEDURE — 99231 SBSQ HOSP IP/OBS SF/LOW 25: CPT | Performed by: PSYCHIATRY & NEUROLOGY

## 2020-02-07 RX ADMIN — SODIUM CHLORIDE, PRESERVATIVE FREE 10 ML: 5 INJECTION INTRAVENOUS at 08:34

## 2020-02-07 RX ADMIN — ATORVASTATIN CALCIUM 80 MG: 40 TABLET, FILM COATED ORAL at 21:03

## 2020-02-07 RX ADMIN — LEVOTHYROXINE SODIUM 37.5 MCG: 0.07 TABLET ORAL at 05:57

## 2020-02-07 RX ADMIN — AMLODIPINE BESYLATE 10 MG: 10 TABLET ORAL at 08:33

## 2020-02-07 NOTE — THERAPY TREATMENT NOTE
"Patient Name: Amber Thurman  : 3/13/1930    MRN: [id number]                              Today's Date: 2020       Admit Date: 2020    Visit Dx:     ICD-10-CM ICD-9-CM   1. Generalized weakness R53.1 780.79   2. At risk for falls Z91.81 V15.88   3. Impaired mobility and ADLs Z74.09 799.89   4. Cognitive communication deficit R41.841 799.52     Patient Active Problem List   Diagnosis   • Atrial flutter (CMS/HCC)   • Hypothyroidism (acquired)   • Generalized weakness   • CVA (cerebral vascular accident) (CMS/HCC)     Past Medical History:   Diagnosis Date   • Acute UTI (urinary tract infection) 2019   • Disease of thyroid gland    • Rhabdomyolysis 2019   • TIA (transient ischemic attack)      Past Surgical History:   Procedure Laterality Date   • ANKLE SURGERY     • BACK SURGERY     • FRACTURE SURGERY     • HERNIA REPAIR       General Information     Row Name 20 1614          PT Evaluation Time/Intention    Document Type  therapy note (daily note)  -CD     Mode of Treatment  physical therapy  -CD     Row Name 20 1614          General Information    Patient Profile Reviewed?  yes  -CD     Existing Precautions/Restrictions  fall  -CD     Row Name 20 1614          Cognitive Assessment/Intervention- PT/OT    Orientation Status (Cognition)  oriented to;person;place;disoriented to;situation  -CD     Cognitive Assessment/Intervention Comment  PT PERSEVERATING THAT SHE IS IN A DIFFERENT ROOM. \"THIS ROOM DOES NOT LOOK FAMILIAR\" MILDLY AGGRAVATED RE: FLANNEL PJ'S NOT AVAILABLE ETC.   -CD     Row Name 20 1614          Safety Issues, Functional Mobility    Safety Issues Affecting Function (Mobility)  safety precautions follow-through/compliance;safety precaution awareness;insight into deficits/self awareness  -CD     Impairments Affecting Function (Mobility)  balance;strength;endurance/activity tolerance  -CD       User Key  (r) = Recorded By, (t) = Taken By, (c) = Cosigned By    " Initials Name Provider Type    CD Brittni Dykes, PT Physical Therapist        Mobility     Row Name 02/07/20 1616          Bed Mobility Assessment/Treatment    Supine-Sit Spencer (Bed Mobility)  verbal cues;contact guard  -CD     Assistive Device (Bed Mobility)  head of bed elevated;bed rails  -CD     Comment (Bed Mobility)  INCREASED TIME AND EFFORT.   -CD     Row Name 02/07/20 1616          Transfer Assessment/Treatment    Comment (Transfers)  CUES FOR HAND PLACEMENT. STS FROM EOB X 2 AND FROM COMMODE IN BATHROOM. REQUIRED MAX ASSIST TO GRANT PANTS AND MANAGE CLOTHING AFTER TOILETING.   -CD     Row Name 02/07/20 1616          Sit-Stand Transfer    Sit-Stand Spencer (Transfers)  minimum assist (75% patient effort);verbal cues  -CD     Assistive Device (Sit-Stand Transfers)  walker, front-wheeled  -CD     Row Name 02/07/20 1616          Gait/Stairs Assessment/Training    Gait/Stairs Assessment/Training  gait/ambulation independence  -CD     Spencer Level (Gait)  minimum assist (75% patient effort);verbal cues  -CD     Assistive Device (Gait)  walker, front-wheeled  -CD     Distance in Feet (Gait)  48 FEET.   -CD     Pattern (Gait)  step-to  -CD     Deviations/Abnormal Patterns (Gait)  gait speed decreased;stride length decreased;dwayne decreased  -CD     Bilateral Gait Deviations  forward flexed posture  -CD     Left Sided Gait Deviations  heel strike decreased  -CD     Comment (Gait/Stairs)  SEVERE KYPHOSIS, MANUAL ASSIST TO GUIDE R WALKER. STOPPED AT COMMODE AND SINK DURING GAIT. DISTANCE LIMITED DUE TO SOA AND ELEVATED HR. RECLINER BROUGHT UP BEHIND PT TO SIT.   -CD       User Key  (r) = Recorded By, (t) = Taken By, (c) = Cosigned By    Initials Name Provider Type    CD Brittni Dykes, PT Physical Therapist        Obj/Interventions     Row Name 02/07/20 1623          Therapeutic Exercise    Lower Extremity (Therapeutic Exercise)  marching while seated;LAQ (long arc quad), bilateral  -CD     Lower  Extremity Range of Motion (Therapeutic Exercise)  ankle dorsiflexion/plantar flexion, bilateral  -CD     Position (Therapeutic Exercise)  seated  -CD     Sets/Reps (Therapeutic Exercise)  1 SET OF 10 REPS.   -CD     Row Name 02/07/20 1623          Static Sitting Balance    Level of [place] (Unsupported Sitting, Static Balance)  supervision  -CD     Sitting Position (Unsupported Sitting, Static Balance)  sitting on edge of bed AND COMMODE.   -CD     Row Name 02/07/20 1623          Dynamic Sitting Balance    Level of [place], Reaches Outside Midline (Sitting, Dynamic Balance)  contact guard assist  -CD     Sitting Position, Reaches Outside Midline (Sitting, Dynamic Balance)  sitting on edge of bed  -CD     Row Name 02/07/20 1623          Static Standing Balance    Level of [place] (Supported Standing, Static Balance)  contact guard assist  -CD     Assistive Device Utilized (Supported Standing, Static Balance)  walker, rolling  -CD     Row Name 02/07/20 1623          Dynamic Standing Balance    Level of [place], Reaches Outside Midline (Standing, Dynamic Balance)  minimal assist, 75% patient effort  -CD     Assistive Device Utilized (Supported Standing, Dynamic Balance)  walker, rolling  -CD     Comment, Reaches Outside Midline (Standing, Dynamic Balance)  (S) GAIT IN ROOM, ADL'S AT SINK.   -CD       User Key  (r) = Recorded By, (t) = Taken By, (c) = Cosigned By    Initials Name Provider Type    CD Brittni Dykes PT Physical Therapist        Goals/Plan    No documentation.       Clinical Impression     Row Name 02/07/20 1624          Pain Scale: Numbers Pre/Post-Treatment    Pain Scale: Numbers, Pretreatment  0/10 - no pain  -CD     Pain Scale: Numbers, Post-Treatment  0/10 - no pain  -CD     Row Name 02/07/20 1624          Plan of Care Review    Plan of Care Reviewed With  patient  -CD     Progress  improving  -CD     Outcome Summary  IMPROVED BED MOBILITY TODAY. GAIT DISTANCE LIMITED BY FATIGUE.  PT AMBULATED 48 FEET WITH R WALKER AND MIN ASSIST. RECOMMEND SNF AT D/C.   -CD     Row Name 02/07/20 1624          Physical Therapy Clinical Impression    Patient/Family Goals Statement (PT Clinical Impression)  TO GO HOME.   -CD     Criteria for Skilled Interventions Met (PT Clinical Impression)  yes  -CD     Rehab Potential (PT Clinical Summary)  good, to achieve stated therapy goals  -CD     Row Name 02/07/20 1624          Vital Signs    Pre Systolic BP Rehab  -- ELEVATED HR  WITH GAIT.   -CD     Pre Patient Position  Supine  -CD     Intra Patient Position  Standing  -CD     Post Patient Position  Sitting  -CD     Row Name 02/07/20 1624          Positioning and Restraints    Pre-Treatment Position  in bed  -CD     Post Treatment Position  chair  -CD     In Chair  reclined;call light within reach;encouraged to call for assist;exit alarm on;legs elevated;RUE elevated;LUE elevated;notified nsg  -CD       User Key  (r) = Recorded By, (t) = Taken By, (c) = Cosigned By    Initials Name Provider Type    CD Brittni Dykes, PT Physical Therapist        Outcome Measures     Row Name 02/07/20 1627          How much help from another person do you currently need...    Turning from your back to your side while in flat bed without using bedrails?  3  -CD     Moving from lying on back to sitting on the side of a flat bed without bedrails?  3  -CD     Moving to and from a bed to a chair (including a wheelchair)?  3  -CD     Standing up from a chair using your arms (e.g., wheelchair, bedside chair)?  3  -CD     Climbing 3-5 steps with a railing?  2  -CD     To walk in hospital room?  3  -CD     AM-PAC 6 Clicks Score (PT)  17  -CD     Row Name 02/07/20 1627          Modified Atkinson Scale    Modified Atkinson Scale  4 - Moderately severe disability.  Unable to walk without assistance, and unable to attend to own bodily needs without assistance.  -CD     Row Name 02/07/20 1627          Functional Assessment    Outcome Measure  Options  AM-PAC 6 Clicks Basic Mobility (PT);Modified [place]  -CD       User Key  (r) = Recorded By, (t) = Taken By, (c) = Cosigned By    Initials Name Provider Type    Brittni Abraham PT Physical Therapist          PT Recommendation and Plan  Planned Therapy Interventions (PT Eval): balance training, bed mobility training, gait training, home exercise program, transfer training, strengthening  Outcome Summary/Treatment Plan (PT)  Anticipated Discharge Disposition (PT): skilled nursing facility  Plan of Care Reviewed With: patient  Progress: no change  Outcome Summary: IMPROVED BED MOBILITY TODAY. GAIT DISTANCE LIMITED BY FATIGUE. PT AMBULATED 48 FEET WITH R WALKER AND MIN ASSIST. RECOMMEND SNF AT D/C.      Time Calculation:   PT Charges     Row Name 02/07/20 1629             Time Calculation    Start Time  1525  -CD      PT Received On  02/07/20  -CD      PT Goal Re-Cert Due Date  02/16/20  -CD         Time Calculation- PT    Total Timed Code Minutes- PT  15 minute(s)  -CD         Timed Charges    92834 - PT Therapeutic Exercise Minutes  15  -CD      27834 - Gait Training Minutes   15  -CD      98913 - PT Therapeutic Activity Minutes  15  -CD        User Key  (r) = Recorded By, (t) = Taken By, (c) = Cosigned By    Initials Name Provider Type    Brittni Abraham PT Physical Therapist        Therapy Charges for Today     Code Description Service Date Service Provider Modifiers Qty    84272243670 HC PT EVAL LOW COMPLEXITY 4 2/6/2020 Brittni Dykes, PT GP 1    86605890762 HC PT THER PROC EA 15 MIN 2/7/2020 Brittni Dykes, PT GP 1    78456768075 HC GAIT TRAINING EA 15 MIN 2/7/2020 Brittin Dykes, PT GP 1    13352313660 HC PT THERAPEUTIC ACT EA 15 MIN 2/7/2020 Brittni Dykes, PT GP 1          PT G-Codes  Outcome Measure Options: AM-PAC 6 Clicks Basic Mobility (PT), Modified [place]  AM-PAC 6 Clicks Score (PT): 17  AM-PAC 6 Clicks Score (OT): 15  Modified [place] Scale: 4 - Moderately severe disability.  Unable to walk  without assistance, and unable to attend to own bodily needs without assistance.    Brittni Dykes, PT  2/7/2020

## 2020-02-07 NOTE — DISCHARGE PLACEMENT REQUEST
"Ge Thurman (89 y.o. Female)   Rqoue Campbell, RN Case Manager  [phone]  Regard then possible long term placement    Date of Birth Social Security Number Address Home Phone MRN    03/13/1930  [address] [facility] [address] [phone] [id number]    Restoration Marital Status          None        Admission Date Admission Type Admitting Provider Attending Provider Department, Room/Bed    2/5/20 Emergency J Luis Morfin DO Shields, Daniel Alan,  [facility] 3F, S311/1    Discharge Date Discharge Disposition Discharge Destination                       Attending Provider:  J Luis Morfin DO    Allergies:  Aspirin, Sulfa Antibiotics    Isolation:  None   Infection:  None   Code Status:  No CPR    Ht:  160 cm (62.99\")   Wt:  47.6 kg (104 lb 15 oz)    Admission Cmt:  None   Principal Problem:  CVA (cerebral vascular accident) (CMS/[facility]) [I63.9]                 Active Insurance as of 2/5/2020     Primary Coverage     Payor Plan Insurance Group Employer/Plan Group    MEDICARE MEDICARE A & B      Payor Plan Address Payor Plan Phone Number Payor Plan Fax Number Effective Dates    [address] [phone]  3/1/1995 - None Entered    [facility] [id number]       Subscriber Name Subscriber Birth Date Member ID       GE THURMAN 3/13/1930 [id number]           Secondary Coverage     Payor Plan Insurance Group Employer/Plan Group    [facility] [facility] [facility]      Payor Plan Address Payor Plan Phone Number Payor Plan Fax Number Effective Dates    [facility] [phone]  1/1/2017 - None Entered    [address]       [facility] [id number]       Subscriber Name Subscriber Birth Date Member ID       GE THURMAN 3/13/1930 [id number]                 Emergency Contacts      (Rel.) Home Phone Work Phone Mobile Phone    Marielle Thurman (Relative) [phone] -- --    Alfredo Thurman (Son) [phone] -- --               History & Physical      Walker, " "Kellen MUSE DO at 20              [facility] Medicine Services  HISTORY AND PHYSICAL    Patient Name: Amber Thurman  : 3/13/1930  MRN: [id number]  Primary Care Physician: Dinh Alan MD  Date of admission: 2020      Subjective   Subjective     Chief Complaint:  Weakness     HPI:  Amber Thurman is a 89 y.o. female w/ a hx of remote TIA, hypothyroidism, peripheral neuropathy and previous atrial flutter who presented to the ED w/ c/o generalized weakness.   Per pt's daughter-in-law, pt has had a significant physical decline since . Pt lives alone and is mostly independent at baseline. Pt does require assistance w/ bathing/showering which is provided by family. Pt's son and daughter in law live next door. The pt's grandson works from within the pt's home. On , the grandson noted that the patent was \"acting strangely\" and he reported possible slurred speech. The daughter in law stopped by pt's home on  evening and noted that pt was \"@ baseline\" w/ no slurred speech, unilateral weakness, etc. However, when pt went to stand up she was unable to 2/2 significant generalized weakness. Since  pt has had ongoing generalized weakness and at times is unable to ambulate due to this. She has called out several times during the night c/o needing to use the restroom but is unable to stand or ambulate to get to the bathroom. The family describes her as \"dead weight\" in these instances. The daughter in law held pt's nightly temazepam last night in hopes that it was the cause of her weakness but today pt had no improvement. Per family, pt has been at baseline mentally w/ no confusion noted.   Pt denies recent new medications, illness or hospitalizations. Pt denies fever/chills, chest pain, dyspnea, cough, N/V/D, abdominal pain, dysuria, edema, syncope, confusion, unilateral weakness, dysphagia, coughing w/ eating/drinking, visual changes, headache.   Pt evaluated " "in the ED. Labs and CT head unremarkable; MRI brain revealed \"2 very small areas of recent infarct involving the bilateral centrum semiovale\" . Pt admitted to the hospital medicine service for further evaluation.     **Pt w/ hx of admission to [facility] in 8/2019 2/2 fall. Pt was found to be in atrial flutter. Pt was given Lopressor and converted to NSR. Pt had no repeat episodes of atrial fib/flutter during her hospitalization. Pt not started on anticoagulation 2/2 hx of frequent falls.       Review of Systems   Constitutional: Negative.    HENT: Negative.    Eyes: Negative.    Respiratory: Negative.    Cardiovascular: Negative.    Gastrointestinal: Negative.    Endocrine: Negative.    Musculoskeletal: Positive for gait problem. Negative for arthralgias, back pain, joint swelling, myalgias, neck pain and neck stiffness.        Generalized weakness; acute onset Sunday afternoon. Unable to ambulate 2/2 generalized weakness.    Skin: Negative.    Allergic/Immunologic: Negative.    Hematological: Negative.    Psychiatric/Behavioral: Negative.    All other systems reviewed and are negative.       Personal History     Past Medical History:   Diagnosis Date   • Acute UTI (urinary tract infection) 8/23/2019   • Disease of thyroid gland    • Rhabdomyolysis 8/23/2019   • TIA (transient ischemic attack)        Past Surgical History:   Procedure Laterality Date   • ANKLE SURGERY     • BACK SURGERY     • FRACTURE SURGERY     • HERNIA REPAIR         Family History: family history includes No Known Problems in her father and mother. Otherwise pertinent FHx was reviewed and unremarkable.     Social History:  reports that she has never smoked. She has never used smokeless tobacco. She reports that she does not drink alcohol or use drugs.  Social History     Social History Narrative    Pt lives alone. Uses a walker to ambulate. Requires assistance w/ showering and bathing. Otherwise, pt independent w/ ADLs. Pt's son and daughter-in-law " live next door and are very involved in her care.        Medications:  Available home medication information reviewed.  Medications Prior to Admission   Medication Sig Dispense Refill Last Dose   • levothyroxine (SYNTHROID, LEVOTHROID) 75 MCG tablet Take 0.5 tablets by mouth Daily. 30 tablet 0 Past Month at Unknown time   • temazepam (RESTORIL) 15 MG capsule Take 1 capsule by mouth At Night As Needed for Sleep. 3 capsule 0 Past Month at Unknown time   • traMADol (ULTRAM) 50 MG tablet Take 1 tablet by mouth Every 8 (Eight) Hours As Needed for Moderate Pain . 9 tablet 0 Past Month at Unknown time       Allergies   Allergen Reactions   • Aspirin Rash   • Sulfa Antibiotics Rash       Objective   Objective     Vital Signs:   Temp:  [97.9 °F (36.6 °C)-98.3 °F (36.8 °C)] 98 °F (36.7 °C)  Heart Rate:  [74-99] 74  Resp:  [16-18] 16  BP: (145-175)/() 149/93   Total (NIH Stroke Scale): 2    Physical Exam   Constitutional: She is oriented to person, place, and time. She appears well-developed and well-nourished. No distress.   HENT:   Head: Normocephalic and atraumatic.   Eyes: Pupils are equal, round, and reactive to light. EOM are normal.   Neck: Normal range of motion. Neck supple.   Cardiovascular: Normal rate, regular rhythm, normal heart sounds and intact distal pulses. Exam reveals no gallop and no friction rub.   No murmur heard.  Pulmonary/Chest: Effort normal and breath sounds normal. No stridor. No respiratory distress. She has no wheezes. She has no rales. She exhibits no tenderness.   Abdominal: Soft. Bowel sounds are normal. She exhibits no distension. There is no tenderness. There is no guarding.   Musculoskeletal: Normal range of motion. She exhibits no edema, tenderness or deformity.   Neurological: She is alert and oriented to person, place, and time. No cranial nerve deficit.   Pt oriented and alert. Mild confusion about current year/month (baseline per family). No focal deficits noted. No unilateral  weakness, facial droop or slurred speech.    Skin: Skin is warm and dry. Capillary refill takes more than 3 seconds. No rash noted. She is not diaphoretic. No erythema. No pallor.   Psychiatric: She has a normal mood and affect. Her behavior is normal. Judgment and thought content normal.   Nursing note and vitals reviewed.       Results Reviewed:  I have personally reviewed current lab and radiology data.    Results from last 7 days   Lab Units 02/05/20  1403   WBC 10*3/mm3 9.26   HEMOGLOBIN g/dL 13.7   HEMATOCRIT % 42.6   PLATELETS 10*3/mm3 225     Results from last 7 days   Lab Units 02/05/20  1512 02/05/20  1403   SODIUM mmol/L  --  140   POTASSIUM mmol/L  --  4.0   CHLORIDE mmol/L  --  104   CO2 mmol/L  --  26.0   BUN mg/dL  --  17   CREATININE mg/dL  --  0.85   GLUCOSE mg/dL  --  90   CALCIUM mg/dL  --  9.0   ALT (SGPT) U/L  --  8   AST (SGOT) U/L  --  17   TROPONIN T ng/mL  --  0.020   PROBNP pg/mL  --  201.2   LACTATE mmol/L 0.9  --      Estimated Creatinine Clearance: 31.8 mL/min (by C-G formula based on SCr of 0.85 mg/dL).  Brief Urine Lab Results  (Last result in the past 365 days)      Color   Clarity   Blood   Leuk Est   Nitrite   Protein   CREAT   Urine HCG        02/05/20 1529 Yellow Clear Negative Negative Negative Negative             Imaging Results (Last 24 Hours)     Procedure Component Value Units Date/Time    MRI Brain Without Contrast [[id number]] Collected:  02/05/20 1939     Updated:  02/05/20 1941    Narrative:       INDICATION:    Weakness, slurred speech that started 3 days ago. Patient feels weak with standing. History of TIA.    TECHNIQUE:   MRI of the brain without contrast.    COMPARISON:    Head CT from 8/23/2019 is available for review. Head CT from earlier this evening is not currently available for review.    FINDINGS:  There are punctate areas of restricted diffusion involving the bilateral centrum semiovale. This includes a 5 mm lesion in the left superior parietal white matter  and a 4 mm lesion in the right posterior frontal white matter. These tiny recent infarcts  could be due to small vessel disease or could be due to a subtle watershed insult. There is generalized atrophy. There is extensive pre-existing white matter disease as well as pre-existing bilateral basal ganglionic and thalamic lacunar insults. These  findings favor recent small vessel insults to the bilateral centrum semiovale. Additionally there is moderate signal abnormality in the bilateral kobi which is likely due to small vessel disease. There is no extra-axial fluid collection or intracranial  mass effect. There is punctate susceptibility in the left posterior superior insula consistent with remote blood product or mineral deposition. No recent hemorrhage is suspected. No intracranial mass effect. No hydrocephalus. The major arterial  intracranial flow voids are maintained. The mastoid air cells and visualized paranasal sinuses are clear.      Impression:         1. There are 2 very small areas of recent infarct involving the bilateral centrum semiovale. These are likely tiny recent small vessel insults superimposed upon a background of extensive pre-existing small vessel disease. There is no evidence for mass  effect or hemorrhagic transformation.    Signer Name: Aliza Zarate MD   Signed: 2/5/2020 7:39 PM   Workstation Name: [email or web address]    [facility]    CT Head Without Contrast [[id number]] Collected:  02/05/20 1553     Updated:  02/05/20 1611    Narrative:       EXAMINATION: CT HEAD WO CONTRAST - 02/05/2020     INDICATION: Weakness. Unable to walk.     TECHNIQUE: Unenhanced CT imaging of the head was performed with  additional coronal reformatted imaging provided for review.     The radiation dose reduction device was turned on for each scan per the  ALARA (As Low as Reasonably Achievable) protocol.     COMPARISON: CT of the head 8/23/2019     FINDINGS: Unenhanced CT imaging of the head  demonstrates no evidence for  midline shift or mass effect. No hydrocephalus. No extra-axial fluid  collection or hemorrhage is identified. Chronic microvascular ischemic  changes identified similar to prior. There is no convincing evidence for  acute transcortical infarct identified as visualized. The visualized  calvarium appears intact. No evidence of depressed skull fracture.  Paranasal sinuses appear clear. Evaluation of the coronal images  demonstrate no convincing evidence for extra-axial fluid collection.  Calvarium is intact. The surrounding soft tissues are unrevealing.  Postsurgical changes to the globes are identified. Visualized upper  oropharynx is not reveal acute abnormality or lesion.       Impression:       Chronic age-related changes of the brain without evidence  for acute abnormality appreciated.     DICTATED:   02/05/2020  EDITED/ls :   02/05/2020        XR Chest 1 View [[id number]] Collected:  02/05/20 1356     Updated:  02/05/20 1404    Narrative:       EXAMINATION: XR CHEST 1 VW- 02/05/2020     INDICATION: Weak/Dizzy/AMS triage protocol      COMPARISON: Chest radiograph 08/23/2019     FINDINGS: Single portable chest radiograph was submitted for review. The  patient is markedly side bent rotated, similar to prior exam. Heart is  not enlarged.  Atherosclerotic calcification of the aorta identified.  Chronic appearing changes in the lungs are identified without convincing  evidence of active airspace disease. No pleural effusion or  pneumothorax. Visualized upper abdomen is unrevealing. Thoracic spinal  curvature is again noted similar to prior. Vertebroplasty cement is  noted. Diffuse osteopenia and degenerative changes of the shoulders  identified.           Impression:       Chronic appearing changes of the chest without convincing  evidence for active airspace disease.     D:  02/05/2020  E:  02/05/2020              Results for orders placed during the hospital encounter of 08/23/19   Adult  "Transthoracic Echo Complete W/ Cont if Necessary Per Protocol    Narrative · Left ventricular systolic function is normal. Estimated EF appears to be   in the range of 66 - 70%.  · Sigmoid-shaped ventricular septum is present.  · There is moderate calcification of the aortic valve.Trace-to-mild aortic   valve regurgitation is present. No aortic valve stenosis is present  · Mild tricuspid valve regurgitation is present. Estimated right   ventricular systolic pressure from tricuspid regurgitation is normal (<35   mmHg).          Assessment/Plan   Assessment & Plan     Active Hospital Problems    Diagnosis POA   • **CVA (cerebral vascular accident) (CMS/HCC) [I63.9] Yes   • Generalized weakness [R53.1] Yes   • Hypothyroidism (acquired) [E03.9] Yes     Assessment & Plan    **CVA  -CT head negative, MRI brain obtained/reviewed  -baseline EKG reviewed; trend  -pt w/ ASA allergy- \"rash\"; not given  -lipid panel, tsh, hem a1c w/ am labs  -high dose statin to be given tonight  -neuro checks q 4 hrs, stroke scale q shift  -bilateral carotid duplex ordered  -ECHO ordered  -pt/ot/speech, case mgt consult in am  -bedside dysphagia evaluation  -s/p NS 500ml bolus given in ED; hold on further IVF and monitor I/Os  -Neurology consult in the am     **Generalized weakness  -plan per above  -fall precautions  -pt/ot and case mgt consult    **Hypothyroidism  -tsh w/ am labs  -continue routine synthroid     **Hx of atrial flutter  -per d/c summary- pt admitted to [facility] in 8/2019 2/2 fall; pt noted to be in aflutter per EKG in ED; pt given IV Lopressor and converted to NSR- remained in NSR throughout hospitalization; was not started on anticoagulation 2/2 fall risk; per pt and family- \"no hx of HTN, CAD, a.Fib or A.Flutter\"  -EKG reviewed; NSR  -repeat EKG in am  -continuous telemetry      DVT prophylaxis:  Mechanical     CODE STATUS:    Code Status and Medical Interventions:   Ordered at: 02/05/20 8265     Level Of Support Discussed With: "    Patient     Code Status:    CPR     Medical Interventions (Level of Support Prior to Arrest):    Full       Admission Status:  I believe this patient meets OBSERVATION status, however if further evaluation or treatment plans warrant, status may change.  Based upon current information, I predict patient's care encounter to be less than or equal to 2 midnights.      Electronically signed by ZEFERINO Queen, 02/05/20, 7:19 PM.      Attending   Admission Attestation       I have seen and examined the patient, performing an independent face-to-face diagnostic evaluation with plan of care reviewed and developed with the advanced practice clinician (APC).      Brief Summary Statement:   Amber Thurman is a 89 y.o. female w/ a hx of remote TIA, hypothyroidism, peripheral neuropathy and previous atrial flutter who presented to the ED w/ c/o generalized weakness. Initial CT head showed no acute changes. MRI shows 2 small areas of acute infarct. Pt out of window for tPA as she has had generalized weakness for several days.     Remainder of detailed HPI is as noted by APC and has been reviewed and/or edited by me for completeness.    Attending Physical Exam:  Constitutional: Awake, alert, NAD  Eyes: PERRLA, sclerae anicteric, no conjunctival injection  HENT: NCAT, mucous membranes moist  Neck: Supple, no thyromegaly, no lymphadenopathy, trachea midline  Respiratory: Clear to auscultation bilaterally, nonlabored respirations   Cardiovascular: RRR, +murmur, No rubs or gallops, palpable pedal pulses bilaterally  Gastrointestinal: Positive bowel sounds, soft, nontender, nondistended  Musculoskeletal: No bilateral ankle edema, no clubbing or cyanosis to extremities  Psychiatric: Appropriate affect, cooperative  Neurologic: Mentation at baseline, strength symmetric in all extremities, Cranial Nerves grossly intact to confrontation, speech clear  Skin: No rashes    Brief Assessment/Plan :  Will admit patient for further  stroke work up. Echo and carotid duplex. Has reported ASA allergy so not given. Will give high intensity statin. Neuro to see in AM. PT/OT/SLP.  See detailed assessment and plan developed with APC which I have reviewed and/or edited for completeness.    Electronically signed by Kellen Walker DO, 02/05/20, 10:16 PM.              Electronically signed by Kellen Walker DO at 02/05/20 7718       Vital Signs (last day)     Date/Time   Temp   Temp src   Pulse   Resp   BP   Patient Position   SpO2    02/07/20 0441   97.7 (36.5)   Oral   114   16   146/94   Lying   --    02/07/20 0022   98.2 (36.8)   Oral   91   16   132/86   Lying   --    02/06/20 1915   98 (36.7)   Oral   95   16   129/80   Lying   --    02/06/20 1800   --   --   104   --   --   --   --    02/06/20 1600   --   --   99   --   --   --   --    02/06/20 1540   97.9 (36.6)   Oral   102   16   148/92   Lying   93    02/06/20 1400   --   --   99   --   --   --   --    02/06/20 1200   --   --   90   --   --   --   --    02/06/20 1100   --   --   94   --   128/91   --   95    02/06/20 1000   --   --   114   --   --   --   95    02/06/20 0900   --   --   109   --   (!) 139/108   --   --    02/06/20 0800   --   --   88   --   --   --   92    02/06/20 0715   --   Oral   --   --   --   --   --    02/06/20 0640   98.1 (36.7)   Oral   95   16   (!) 160/104   Lying   94    02/06/20 0502   98 (36.7)   Oral   86   16   144/99   Lying   91    02/06/20 0029   97.9 (36.6)   Oral   87   16   --   Lying   92                Current Facility-Administered Medications   Medication Dose Route Frequency Provider Last Rate Last Dose   • acetaminophen (TYLENOL) tablet 650 mg  650 mg Oral Q4H PRN Kellen Walker DO        Or   • acetaminophen (TYLENOL) 160 MG/5ML solution 650 mg  650 mg Oral Q4H PRN Kellen Walker, DO        Or   • acetaminophen (TYLENOL) suppository 650 mg  650 mg Rectal Q4H PRN Kellen Walker, DO       • amLODIPine (NORVASC) tablet 10 mg  10 mg  Oral Q24H J Luis Morfin DO   10 mg at 20 0833   • atorvastatin (LIPITOR) tablet 80 mg  80 mg Oral Nightly Zoey Pollard APRN   80 mg at 20 2204   • levothyroxine (SYNTHROID, LEVOTHROID) tablet 37.5 mcg  37.5 mcg Oral Q AM Zoey Pollard APRN   37.5 mcg at 20 0557   • melatonin tablet 5 mg  5 mg Oral Nightly PRN Kellen Walker DO   5 mg at 20 2204   • ondansetron (ZOFRAN) tablet 4 mg  4 mg Oral Q6H PRN Kellen Walker DO        Or   • ondansetron (ZOFRAN) injection 4 mg  4 mg Intravenous Q6H PRN Kellen Walker DO       • sodium chloride 0.9 % flush 10 mL  10 mL Intravenous Q12H Zoey Pollard APRN   10 mL at 20 0834   • sodium chloride 0.9 % flush 10 mL  10 mL Intravenous PRN Zoey Pollard APRN   10 mL at 20 1646   • sodium chloride 0.9 % infusion  100 mL/hr Intravenous Continuous J Luis Morfin  mL/hr at 20 1610 100 mL/hr at 20 1610        Physician Progress Notes (last 24 hours) (Notes from 20 0846 through 20 0846)      J Luis Morfin DO at 20 1353              [facility] Medicine Services  PROGRESS NOTE    Patient Name: Amber Thurman  : 3/13/1930  MRN: [id number]    Date of Admission: 2020  Primary Care Physician: Dinh Alan MD    Subjective   Subjective     CC:  Follow up weakness    HPI:  Biggest complaint this AM is needing to use the restroom. Says she can't walk at home because of her neuropathy. Denies acute complaint. No family at bedside.     Review of Systems  Gen- No fevers, chills  CV- No chest pain, palpitations  Resp- No cough, dyspnea  GI- No N/V/D, abd pain    Objective   Objective     Vital Signs:   Temp:  [97.9 °F (36.6 °C)-98.1 °F (36.7 °C)] 98.1 °F (36.7 °C)  Heart Rate:  [] 90  Resp:  [16-18] 16  BP: (128-175)/() 128/91  Total (NIH Stroke Scale): 1     Physical Exam:  Constitutional: No acute distress, awake, alert, frail  appearing older woman  HENT: NCAT, mucous membranes moist  Respiratory: Clear to auscultation bilaterally, respiratory effort normal   Cardiovascular: Slightly tachycardic, RR, no murmurs, rubs, or gallops, palpable radial pulses bilaterally  Gastrointestinal: Positive bowel sounds, soft, nontender, nondistended  Musculoskeletal: No bilateral ankle edema  Psychiatric: Pleasantly demented  Neurologic: Strength is symmetrical, no slurring of speech    Results Reviewed:  Results from last 7 days   Lab Units 02/06/20  0419 02/05/20  1403   WBC 10*3/mm3 7.63 9.26   HEMOGLOBIN g/dL 12.9 13.7   HEMATOCRIT % 39.8 42.6   PLATELETS 10*3/mm3 198 225     Results from last 7 days   Lab Units 02/06/20  0419 02/05/20  1403   SODIUM mmol/L 140 140   POTASSIUM mmol/L 3.9 4.0   CHLORIDE mmol/L 108* 104   CO2 mmol/L 22.0 26.0   BUN mg/dL 18 17   CREATININE mg/dL 0.89 0.85   GLUCOSE mg/dL 112* 90   CALCIUM mg/dL 8.7 9.0   ALT (SGPT) U/L  --  8   AST (SGOT) U/L  --  17   TROPONIN T ng/mL  --  0.020   PROBNP pg/mL  --  201.2     Estimated Creatinine Clearance: 32.2 mL/min (by C-G formula based on SCr of 0.89 mg/dL).    Microbiology Results Abnormal     Procedure Component Value - Date/Time    Blood Culture - Blood, Arm, Right [[id number]] Collected:  02/05/20 1530    Lab Status:  Preliminary result Specimen:  Blood from Arm, Right Updated:  02/06/20 0415     Blood Culture No growth at less than 24 hours    Blood Culture - Blood, Arm, Left [[id number]] Collected:  02/05/20 1540    Lab Status:  Preliminary result Specimen:  Blood from Arm, Left Updated:  02/06/20 0415     Blood Culture No growth at less than 24 hours          Imaging Results (Last 24 Hours)     Procedure Component Value Units Date/Time    CT Head Without Contrast [[id number]] Collected:  02/05/20 1553     Updated:  02/06/20 0848    Narrative:       EXAMINATION: CT HEAD WO CONTRAST - 02/05/2020     INDICATION: Weakness. Unable to walk.     TECHNIQUE: Unenhanced CT imaging of  the head was performed with  additional coronal reformatted imaging provided for review.     The radiation dose reduction device was turned on for each scan per the  ALARA (As Low as Reasonably Achievable) protocol.     COMPARISON: CT of the head 8/23/2019     FINDINGS: Unenhanced CT imaging of the head demonstrates no evidence for  midline shift or mass effect. No hydrocephalus. No extra-axial fluid  collection or hemorrhage is identified. Chronic microvascular ischemic  changes identified similar to prior. There is no convincing evidence for  acute transcortical infarct identified as visualized. The visualized  calvarium appears intact. No evidence of depressed skull fracture.  Paranasal sinuses appear clear. Evaluation of the coronal images  demonstrate no convincing evidence for extra-axial fluid collection.  Calvarium is intact. The surrounding soft tissues are unrevealing.  Postsurgical changes to the globes are identified. Visualized upper  oropharynx is not reveal acute abnormality or lesion.       Impression:       Chronic age-related changes of the brain without evidence  for acute abnormality appreciated.     DICTATED:   02/05/2020  EDITED/ls :   02/05/2020      This report was finalized on 2/6/2020 8:45 AM by Dr. Senthil Campbell MD.       XR Chest 1 View [[id number]] Collected:  02/05/20 1356     Updated:  02/06/20 0847    Narrative:       EXAMINATION: XR CHEST 1 VW- 02/05/2020     INDICATION: Weak/Dizzy/AMS triage protocol      COMPARISON: Chest radiograph 08/23/2019     FINDINGS: Single portable chest radiograph was submitted for review. The  patient is markedly side bent rotated, similar to prior exam. Heart is  not enlarged.  Atherosclerotic calcification of the aorta identified.  Chronic appearing changes in the lungs are identified without convincing  evidence of active airspace disease. No pleural effusion or  pneumothorax. Visualized upper abdomen is unrevealing. Thoracic spinal  curvature is  again noted similar to prior. Vertebroplasty cement is  noted. Diffuse osteopenia and degenerative changes of the shoulders  identified.           Impression:       Chronic appearing changes of the chest without convincing  evidence for active airspace disease.     D:  02/05/2020  E:  02/05/2020     This report was finalized on 2/6/2020 8:43 AM by Dr. Senthil Campbell MD.       MRI Brain Without Contrast [[id number]] Collected:  02/05/20 1939     Updated:  02/05/20 1941    Narrative:       INDICATION:    Weakness, slurred speech that started 3 days ago. Patient feels weak with standing. History of TIA.    TECHNIQUE:   MRI of the brain without contrast.    COMPARISON:    Head CT from 8/23/2019 is available for review. Head CT from earlier this evening is not currently available for review.    FINDINGS:  There are punctate areas of restricted diffusion involving the bilateral centrum semiovale. This includes a 5 mm lesion in the left superior parietal white matter and a 4 mm lesion in the right posterior frontal white matter. These tiny recent infarcts  could be due to small vessel disease or could be due to a subtle watershed insult. There is generalized atrophy. There is extensive pre-existing white matter disease as well as pre-existing bilateral basal ganglionic and thalamic lacunar insults. These  findings favor recent small vessel insults to the bilateral centrum semiovale. Additionally there is moderate signal abnormality in the bilateral kobi which is likely due to small vessel disease. There is no extra-axial fluid collection or intracranial  mass effect. There is punctate susceptibility in the left posterior superior insula consistent with remote blood product or mineral deposition. No recent hemorrhage is suspected. No intracranial mass effect. No hydrocephalus. The major arterial  intracranial flow voids are maintained. The mastoid air cells and visualized paranasal sinuses are clear.      Impression:          1. There are 2 very small areas of recent infarct involving the bilateral centrum semiovale. These are likely tiny recent small vessel insults superimposed upon a background of extensive pre-existing small vessel disease. There is no evidence for mass  effect or hemorrhagic transformation.    Signer Name: Aliza Zarate MD   Signed: 2/5/2020 7:39 PM   Workstation Name: [email or web address]-    [facility]          Results for orders placed during the hospital encounter of 08/23/19   Adult Transthoracic Echo Complete W/ Cont if Necessary Per Protocol    Narrative · Left ventricular systolic function is normal. Estimated EF appears to be   in the range of 66 - 70%.  · Sigmoid-shaped ventricular septum is present.  · There is moderate calcification of the aortic valve.Trace-to-mild aortic   valve regurgitation is present. No aortic valve stenosis is present  · Mild tricuspid valve regurgitation is present. Estimated right   ventricular systolic pressure from tricuspid regurgitation is normal (<35   mmHg).          I have reviewed the medications:  Scheduled Meds:  amLODIPine 10 mg Oral Q24H   atorvastatin 80 mg Oral Nightly   levothyroxine 37.5 mcg Oral Q AM   sodium chloride 10 mL Intravenous Q12H     Continuous Infusions:   PRN Meds:.•  acetaminophen **OR** acetaminophen **OR** acetaminophen  •  melatonin  •  ondansetron **OR** ondansetron  •  sodium chloride    Assessment/Plan   Assessment & Plan     Active Hospital Problems    Diagnosis  POA   • **CVA (cerebral vascular accident) (CMS/HCC) [I63.9]  Yes   • Generalized weakness [R53.1]  Yes   • Hypothyroidism (acquired) [E03.9]  Yes      Resolved Hospital Problems   No resolved problems to display.        Brief Hospital Course to date:  Amber Thurman is a 89 y.o. female with Hx remote TIA, hypothyroidism, peripheral neuropathy, Aflutter not on anticoagulation due to falls at home who was brought in by family for overall decline over several days  "found to have had 2 recent strokes    The following problems are new to me today    Assessment/Plan    Generalized weakness  - PT/OT  - volume expansion with IVF's  - unclear, possibly age related decline, related to her reported neuropathy, dehydration; UA ordered by neurology and not suggestive of infection  - rehab/skilled placement    Bilateral strokes  - MRI with small bilateral parietal insults, significant ischemic small vessel disease  - \"allergy\" to ASA so not given  - cont statin  -neurology following, cardiology consulted for recommendations on AC proceeding forward  - echo pending    Hypothyroidism  - cont home synthroid    Hx Aflutter  - likely in and out of aflutter at home  - cardiology to see regarding AC recommendations  - currently rate controlled    DVT Prophylaxis:  mechanical    Disposition: I expect the patient to be discharged pending subspecialist evaluation, return of stroke workup, would benefit from rehad.snf placement.    CODE STATUS:   Code Status and Medical Interventions:   Ordered at: 02/05/20 2029     Code Status:    No CPR     Medical Interventions (Level of Support Prior to Arrest):    Full     Comments:    DNR/ DNI         Electronically signed by J Luis Morfin DO, 02/06/20, 1:58 PM.        Electronically signed by J Luis Morfin DO at 02/06/20 1413          Physical Therapy Notes (last 24 hours) (Notes from 02/06/20 0846 through 02/07/20 0846)      Brittni Dykes, PT at 02/06/20 0850  Version 1 of 1         Problem: Patient Care Overview  Goal: Plan of Care Review  Outcome: Ongoing (interventions implemented as appropriate)  Flowsheets (Taken 2/6/2020 0997)  Plan of Care Reviewed With: patient  Outcome Summary: PT PRESENTS WITH EVOLVING SYMPTOMS TO INCLUDE GENERALIZED WEAKNESS, R>LLE, IMPAIRED BALANCE AND DECLINE IN FUNCTIONAL MOBILITY. PT REQUIRED MIN ASSIST FOR STS AND GAIT X 80 FEET WITH R WALKER. PT IS FEARFUL OF FALLING. RECOMMEND SHORT IRF STAY OR HOME WITH "  ASSIST WITH MOBILITY UNTIL DEEMED SAFE BY HHPT.        Electronically signed by rBittni Dykes, PT at 2054     Brittni Dykes PT at 20 0850  Version 1 of 1         Patient Name: Amber Thurman  : 3/13/1930    MRN: [id number]                              Today's Date: 2020       Admit Date: 2020    Visit Dx:     ICD-10-CM ICD-9-CM   1. Generalized weakness R53.1 780.79   2. At risk for falls Z91.81 V15.88   3. Impaired mobility and ADLs Z74.09 799.89     Patient Active Problem List   Diagnosis   • Atrial flutter (CMS/HCC)   • Hypothyroidism (acquired)   • Generalized weakness   • CVA (cerebral vascular accident) (CMS/HCC)     Past Medical History:   Diagnosis Date   • Acute UTI (urinary tract infection) 2019   • Disease of thyroid gland    • Rhabdomyolysis 2019   • TIA (transient ischemic attack)      Past Surgical History:   Procedure Laterality Date   • ANKLE SURGERY     • BACK SURGERY     • FRACTURE SURGERY     • HERNIA REPAIR       General Information     Row Name 20          PT Evaluation Time/Intention    Document Type  evaluation  -CD     Mode of Treatment  physical therapy  -CD     Row Name 20          General Information    Patient Profile Reviewed?  yes  -CD     Prior Level of Function  independent:;all household mobility;gait;transfer;bed mobility;mod assist:;dressing;bathing PT LIVES ALONE BUT HAS FAMILY CLOSEBY. PT USES A REG ROLLING WALKER AT ALL TIMES.   -CD     Existing Precautions/Restrictions  fall  -CD     Barriers to Rehab  none identified  -CD     Row Name 20          Relationship/Environment    Lives With  alone  -CD     Row Name 20          Resource/Environmental Concerns    Current Living Arrangements  home/apartment/condo  -CD     Row Name 20          Home Main Entrance    Number of Stairs, Main Entrance  two  -CD     Row Name 20          Cognitive Assessment/Intervention- PT/OT     Orientation Status (Cognition)  oriented to;person;place  -CD     Row Name 02/06/20 0931          Safety Issues, Functional Mobility    Safety Issues Affecting Function (Mobility)  safety precaution awareness;safety precautions follow-through/compliance  -CD     Impairments Affecting Function (Mobility)  balance;strength;endurance/activity tolerance  -CD     Comment, Safety Issues/Impairments (Mobility)  PT IS FEARFUL OF FALLING.   -CD       User Key  (r) = Recorded By, (t) = Taken By, (c) = Cosigned By    Initials Name Provider Type    CD Brittni Dykes PT Physical Therapist        Mobility     Row Name 02/06/20 0940          Bed Mobility Assessment/Treatment    Comment (Bed Mobility)  PT UIC.   -CD     Row Name 02/06/20 0940          Transfer Assessment/Treatment    Comment (Transfers)  CUES FOR HAND PLACEMENT WITH R WALKER.   -CD     Row Name 02/06/20 0940          Sit-Stand Transfer    Sit-Stand [place] (Transfers)  minimum assist (75% patient effort)  -CD     Assistive Device (Sit-Stand Transfers)  walker, front-wheeled  -CD     Row Name 02/06/20 0940          Gait/Stairs Assessment/Training    Gait/Stairs Assessment/Training  gait/ambulation independence  -CD     [place] Level (Gait)  minimum assist (75% patient effort)  -CD     Assistive Device (Gait)  walker, front-wheeled  -CD     Distance in Feet (Gait)  80 FEET.   -CD     Deviations/Abnormal Patterns (Gait)  gait speed decreased;stride length decreased;dwayne decreased  -CD     Bilateral Gait Deviations  forward flexed posture  -CD     Left Sided Gait Deviations  heel strike decreased  -CD     Comment (Gait/Stairs)  PT IS SIGNIFICANTLY FORWARD FLEXED WITH KYPHOTIC POSTURE. NEEDS SHOES FROM HOME FOR INCREASED STABILITY WITH GAIT.   -CD       User Key  (r) = Recorded By, (t) = Taken By, (c) = Cosigned By    Initials Name Provider Type    Brittni Abraham PT Physical Therapist        Obj/Interventions     Row Name 02/06/20 0942          General  ROM    GENERAL ROM COMMENTS  B LE AROM WFL'S   -CD     Row Name 02/06/20 0942          MMT (Manual Muscle Testing)    General MMT Comments  B HIP FLEX 4/5, R KNEE EXT 3/5, L KNEE EXT 4/5, R DF 3/5, L DF 4/5.   -CD     Row Name 02/06/20 0942          Therapeutic Exercise    Lower Extremity (Therapeutic Exercise)  marching while seated;LAQ (long arc quad), bilateral  -CD     Exercise Type (Therapeutic Exercise)  AROM (active range of motion)  -CD     Position (Therapeutic Exercise)  seated  -CD     Sets/Reps (Therapeutic Exercise)  1 SET OF 10 REPS.   -CD     Row Name 02/06/20 0942          Static Sitting Balance    Level of [place] (Unsupported Sitting, Static Balance)  supervision  -CD     Sitting Position (Unsupported Sitting, Static Balance)  sitting in chair EDGE  -     Row Name 02/06/20 0942          Dynamic Sitting Balance    Level of [place], Reaches Outside Midline (Sitting, Dynamic Balance)  contact guard assist  -CD     Sitting Position, Reaches Outside Midline (Sitting, Dynamic Balance)  sitting in chair EDGE  -[facility] Name 02/06/20 0942          Static Standing Balance    Level of [place] (Supported Standing, Static Balance)  contact guard assist  -CD     Assistive Device Utilized (Supported Standing, Static Balance)  walker, rolling  -CD     Row Name 02/06/20 0942          Dynamic Standing Balance    Level of [place], Reaches Outside Midline (Standing, Dynamic Balance)  minimal assist, 75% patient effort  -CD     Assistive Device Utilized (Supported Standing, Dynamic Balance)  walker, rolling  -CD     Row Name 02/06/20 0942          Sensory Assessment/Intervention    Sensory General Assessment  -- PERIPHERAL NEUROPATHY B LE'S AT BASELINE.   -CD       User Key  (r) = Recorded By, (t) = Taken By, (c) = Cosigned By    Initials Name Provider Type    CD Brittni Dykes, PT Physical Therapist        Goals/Plan     Row Name 02/06/20 0952          Bed Mobility Goal 1 (PT)     Activity/Assistive Device (Bed Mobility Goal 1, PT)  bed mobility activities, all  -CD     [place] Level/Cues Needed (Bed Mobility Goal 1, PT)  independent  -CD     Time Frame (Bed Mobility Goal 1, PT)  long term goal (LTG);2 weeks  -CD     Row Name 02/06/20 0952          Transfer Goal 1 (PT)    Activity/Assistive Device (Transfer Goal 1, PT)  sit-to-stand/stand-to-sit;bed-to-chair/chair-to-bed;walker, rolling  -CD     [place] Level/Cues Needed (Transfer Goal 1, PT)  supervision required  -CD     Time Frame (Transfer Goal 1, PT)  long term goal (LTG);2 weeks  -CD     Row Name 02/06/20 0952          Gait Training Goal 1 (PT)    Activity/Assistive Device (Gait Training Goal 1, PT)  gait (walking locomotion);walker, rolling  -CD     [place] Level (Gait Training Goal 1, PT)  supervision required  -CD     Time Frame (Gait Training Goal 1, PT)  long term goal (LTG);2 weeks  -CD       User Key  (r) = Recorded By, (t) = Taken By, (c) = Cosigned By    Initials Name Provider Type    CD Brittni Dykes, PT Physical Therapist        Clinical Impression     Row Name 02/06/20 0944          Pain Assessment    Additional Documentation  Pain Scale: FACES Pre/Post-Treatment (Group)  -CD     Row Name 02/06/20 0944          Pain Scale: Numbers Pre/Post-Treatment    Pain Location - Side  Right  -CD     Pain Location - Orientation  lower  -CD     Pain Location  back  -CD     Pain Intervention(s)  Ambulation/increased activity;Repositioned  -CD     Row Name 02/06/20 0944          Pain Scale: FACES Pre/Post-Treatment    Pain: FACES Scale, Pretreatment  0-->no hurt  -CD     Pain: FACES Scale, Post-Treatment  0-->no hurt INTRA 4/10 WITH SCOOTING IN RECLINER.   -CD     Pre/Post Treatment Pain Comment  TOLERATED GAIT IN [place].   -CD     Row Name 02/06/20 0944          Plan of Care Review    Plan of Care Reviewed With  patient  -CD     Outcome Summary  PT PRESENTS WITH EVOLVING SYMPTOMS TO INCLUDE GENERALIZED WEAKNESS, R>LLE,  IMPAIRED BALANCE AND DECLINE IN FUNCTIONAL MOBILITY. PT REQUIRED MIN ASSIST FOR STS AND GAIT X 80 FEET WITH R WALKER. PT IS FEARFUL OF FALLING. RECOMMEND SHORT IRF STAY OR HOME WITH 24/7 ASSIST WITH MOBILITY UNTIL DEEMED SAFE BY HHPT.   -CD     Row Name 02/06/20 0944          Physical Therapy Clinical Impression    Patient/Family Goals Statement (PT Clinical Impression)  WANTS TO GET STRONGER. PT IS CONSIDERING IRF AT D/C. TO DISCUSS WITH FAMILY.   -CD     Criteria for Skilled Interventions Met (PT Clinical Impression)  yes  -CD     Rehab Potential (PT Clinical Summary)  good, to achieve stated therapy goals  -CD     Predicted Duration of Therapy (PT)  2 WEEKS   -CD     Row Name 02/06/20 0944          Vital Signs    Pre Systolic BP Rehab  139  -CD     Pre Treatment Diastolic BP  108  -CD     Post Systolic BP Rehab  136  -CD     Post Treatment Diastolic BP  71  -CD     Pretreatment Heart Rate (beats/min)  108  -CD     Posttreatment Heart Rate (beats/min)  112  -CD     Pre SpO2 (%)  95  -CD     O2 Delivery Pre Treatment  room air  -CD     Post SpO2 (%)  95  -CD     O2 Delivery Post Treatment  room air  -CD     Pre Patient Position  Sitting  -CD     Intra Patient Position  Standing  -CD     Post Patient Position  Sitting  -CD     Row Name 02/06/20 0944          Positioning and Restraints    Pre-Treatment Position  sitting in chair/recliner  -CD     Post Treatment Position  chair  -CD     In Chair  reclined;call light within reach;encouraged to call for assist;exit alarm on;RUE elevated;LUE elevated;legs elevated;notified nsg  -CD       User Key  (r) = Recorded By, (t) = Taken By, (c) = Cosigned By    Initials Name Provider Type    CD Brittni Dykes, PT Physical Therapist        Outcome Measures     Row Name 02/06/20 0953          How much help from another person do you currently need...    Turning from your back to your side while in flat bed without using bedrails?  3  -CD     Moving from lying on back to sitting on  the side of a flat bed without bedrails?  3  -CD     Moving to and from a bed to a chair (including a wheelchair)?  3  -CD     Standing up from a chair using your arms (e.g., wheelchair, bedside chair)?  3  -CD     Climbing 3-5 steps with a railing?  2  -CD     To walk in hospital room?  3  -CD     AM-PAC 6 Clicks Score (PT)  17  -CD     Row Name 02/06/20 0953          Modified [place] Scale    Modified [place] Scale  3 - Moderate disability.  Requiring some help, but able to walk without assistance. WITH R WALKER  -CD     Row Name 02/06/20 0953          Functional Assessment    Outcome Measure Options  AM-PAC 6 Clicks Basic Mobility (PT);Modified [place]  -CD       User Key  (r) = Recorded By, (t) = Taken By, (c) = Cosigned By    Initials Name Provider Type    Brittni Abraham PT Physical Therapist          PT Recommendation and Plan  Planned Therapy Interventions (PT Eval): balance training, bed mobility training, gait training, home exercise program, transfer training, strengthening  Outcome Summary/Treatment Plan (PT)  Anticipated Discharge Disposition (PT): inpatient rehabilitation facility  Plan of Care Reviewed With: patient  Outcome Summary: PT PRESENTS WITH EVOLVING SYMPTOMS TO INCLUDE GENERALIZED WEAKNESS, R>LLE, IMPAIRED BALANCE AND DECLINE IN FUNCTIONAL MOBILITY. PT REQUIRED MIN ASSIST FOR STS AND GAIT X 80 FEET WITH R WALKER. PT IS FEARFUL OF FALLING. RECOMMEND SHORT IRF STAY OR HOME WITH 24/7 ASSIST WITH MOBILITY UNTIL DEEMED SAFE BY HHPT.      Time Calculation:   PT Charges     Row Name 02/06/20 0955             Time Calculation    Start Time  0850  -CD      PT Received On  02/06/20  -CD      PT Goal Re-Cert Due Date  02/16/20  -CD        User Key  (r) = Recorded By, (t) = Taken By, (c) = Cosigned By    Initials Name Provider Type    Brittni Abraham PT Physical Therapist        Therapy Charges for Today     Code Description Service Date Service Provider Modifiers Qty    [id number]  PT EVAL LOW  COMPLEXITY 4 2020 Brittni Dykes, PT GP 1          PT G-Codes  Outcome Measure Options: AM-PAC 6 Clicks Basic Mobility (PT), Modified [place]  AM-PAC 6 Clicks Score (PT): 17  Modified [place] Scale: 3 - Moderate disability.  Requiring some help, but able to walk without assistance.(WITH R WALKER)    Brittni Dykes, PT  2020         Electronically signed by Brittni Dykes, PT at 20 0957          Occupational Therapy Notes (last 24 hours) (Notes from 20 0846 through 20 0846)      Kathy Miller, OT at 20 1002          Acute Care - Occupational Therapy Initial Evaluation  [facility]     Patient Name: Amber Thurman  : 3/13/1930  MRN: [id number]  Today's Date: 2020  Onset of Illness/Injury or Date of Surgery: 20  Date of Referral to OT: 20  Referring Physician: DO Aaron     Admit Date: 2020       ICD-10-CM ICD-9-CM   1. Generalized weakness R53.1 780.79   2. At risk for falls Z91.81 V15.88   3. Impaired mobility and ADLs Z74.09 799.89     Patient Active Problem List   Diagnosis   • Atrial flutter (CMS/HCC)   • Hypothyroidism (acquired)   • Generalized weakness   • CVA (cerebral vascular accident) (CMS/HCC)     Past Medical History:   Diagnosis Date   • Acute UTI (urinary tract infection) 2019   • Disease of thyroid gland    • Rhabdomyolysis 2019   • TIA (transient ischemic attack)      Past Surgical History:   Procedure Laterality Date   • ANKLE SURGERY     • BACK SURGERY     • FRACTURE SURGERY     • HERNIA REPAIR            OT ASSESSMENT FLOWSHEET (last 12 hours)      Occupational Therapy Evaluation     Row Name 20 0810                   OT Evaluation Time/Intention    Subjective Information  no complaints  -HK        Document Type  evaluation  -HK        Mode of Treatment  occupational therapy  -HK        Patient Effort  good  -HK        Symptoms Noted During/After Treatment  none  -HK           General Information    Patient Profile Reviewed?   yes  -HK        Onset of Illness/Injury or Date of Surgery  02/05/20  -HK        Referring Physician  DO Aaron   -HK        Patient Observations  alert;cooperative;agree to therapy  -HK        Patient/Family Observations  No family at bedside.   -HK        General Observations of Patient  Pt recieved supine in bed with IV heplocked.   -HK        Prior Level of Function  independent:;all household mobility;community mobility;transfer;gait;bed mobility;mod assist:;ADL's per pt; however poor historian  -HK        Equipment Currently Used at Home  walker, rolling  -HK        Existing Precautions/Restrictions  fall  -HK        Risks Reviewed  patient:;dizziness;nausea/vomiting;LOB;increased discomfort;change in vital signs;increased drainage;lines disloged  -HK        Benefits Reviewed  patient:;improve function;increase independence;increase strength;increase balance;decrease pain;decrease risk of DVT;improve skin integrity;increase knowledge  -HK        Barriers to Rehab  medically complex;previous functional deficit  -HK           Relationship/Environment    Primary Source of Support/Comfort  child(patricia);other (see comments) grand children   -HK        Name of Support/Comfort Primary Source  family lives near by   -HK        Lives With  alone  -HK           Resource/Environmental Concerns    Current Living Arrangements  home/apartment/condo  -HK           Home Main Entrance    Number of Stairs, Main Entrance  two  -HK           Cognitive Assessment/Interventions    Additional Documentation  Cognitive Assessment/Intervention (Group)  -HK           Cognitive Assessment/Intervention- PT/OT    Affect/Mental Status (Cognitive)  anxious  -HK        Orientation Status (Cognition)  oriented x 4  -HK        Follows Commands (Cognition)  follows one step commands;over 90% accuracy  -HK        Cognitive Function (Cognitive)  safety deficit;memory deficit  -HK        Memory Deficit (Cognitive)  moderate deficit  -HK         Safety Deficit (Cognitive)  moderate deficit  -HK           Safety Issues, Functional Mobility    Safety Issues Affecting Function (Mobility)  sequencing abilities;safety precautions follow-through/compliance;safety precaution awareness;judgment;insight into deficits/self awareness;awareness of need for assistance  -HK        Impairments Affecting Function (Mobility)  balance;pain;strength  -HK           Bed Mobility Assessment/Treatment    Bed Mobility Assessment/Treatment  supine-sit;scooting/bridging  -HK        Scooting/Bridging [place] (Bed Mobility)  supervision;verbal cues  -HK        Supine-Sit [place] (Bed Mobility)  minimum assist (75% patient effort);verbal cues  -HK        Bed Mobility, Safety Issues  decreased use of arms for pushing/pulling;impaired trunk control for bed mobility  -HK        Assistive Device (Bed Mobility)  head of bed elevated;bed rails  -HK        Comment (Bed Mobility)  Pt advanced to EOB with Juanita from OT.   -HK           Functional Mobility    Functional Mobility- Ind. Level  moderate assist (50% patient effort);1 person + 1 person to manage equipment;other (see comments)  -HK        Functional Mobility- Device  -- BUE support   -HK        Functional Mobility-Distance (Feet)  3  -HK        Functional Mobility- Safety Issues  weight-shifting ability decreased;step length decreased  -HK        Functional Mobility- Comment  Pt took steps from bed to BSC.   -HK           Transfer Assessment/Treatment    Transfer Assessment/Treatment  sit-stand transfer;stand-sit transfer;stand pivot/stand step transfer  -HK        Comment (Transfers)  Verbal cues for safe hand placement, sequencing, and to maintain upright posture.   -HK           Sit-Stand Transfer    Sit-Stand [place] (Transfers)  moderate assist (50% patient effort);1 person to manage equipment;1 person assist;verbal cues  -HK        Assistive Device (Sit-Stand Transfers)  other (see comments) BUE support   -HK            Stand-Sit Transfer    Stand-Sit [place] (Transfers)  moderate assist (50% patient effort);1 person assist;1 person to manage equipment  -HK        Assistive Device (Stand-Sit Transfers)  other (see comments) BUE support   -HK           Stand Pivot/Stand Step Transfer    Stand Pivot/Stand Step [place]  moderate assist (50% patient effort);1 person assist;1 person to manage equipment;verbal cues  -HK        Assistive Device (Stand Pivot Stand Step Transfer)  -- BUE support   -HK           ADL Assessment/Intervention    BADL Assessment/Intervention  toileting;feeding;grooming  -HK           Grooming Assessment/Training    [place] Level (Grooming)  wash face, hands;set up  -HK        Grooming Position  supported sitting  -HK           Self-Feeding Assessment/Training    [place] Level (Feeding)  scoop food and bring to mouth;liquids to mouth;independent;prepare tray/open items;minimum assist (75% patient effort)  -HK        Self-Feeding Assess/Train, Spillage Amount  minimal  -HK        Position (Self-Feeding)  supported sitting  -HK        Comment (Feeding)  Pt required Juanita for all set up.   -HK           Toileting Assessment/Training    [place] Level (Toileting)  perform perineal hygiene;set up;adjust/manage clothing;maximum assist (25% patient effort)  -HK        Toileting Position  unsupported sitting  -HK        Comment (Toileting)  Pt completed froilan care with setup and required maxA for clothing management.   -HK           BADL Safety/Performance    Impairments, BADL Safety/Performance  balance;strength;range of motion;coordination;cognition  -HK        Cognitive Impairments, BADL Safety/Performance  sequencing abilities;safety precaution follow-through;safety precaution awareness;judgment;insight into deficits/self awareness;awareness, need for assistance  -HK           General ROM    RT Upper Ext  Rt Shoulder Flexion  -HK        LT Upper Ext  Lt Shoulder Flexion  -HK            Right Upper Ext    Rt Shoulder Flexion AROM  R shoulder/elbow crepitus noted with movement. AROM grossly 90 degrees.   -HK        Rt Shoulder Flexion PROM  PROM 120   -HK           Left Upper Ext    Lt Shoulder Flexion AROM  AROM 120  -HK           MMT (Manual Muscle Testing)    Rt Upper Ext  Rt Shoulder Flexion  -HK        Lt Upper Ext  Lt Shoulder Flexion  -HK           MMT Right Upper Ext    Rt Shoulder Flexion MMT, Gross Movement  (3/5) fair  -HK           MMT Left Upper Ext    Lt Shoulder Flexion MMT, Gross Movement  (3+/5) fair plus  -HK           Motor Assessment/Interventions    Additional Documentation  Balance (Group);Fine Motor Testing & Training (Group);Gross Motor Coordination (Group)  -HK           Gross Motor Coordination    Gross Motor Skill, Impairments Detail  intact   -HK           Balance    Balance  static sitting balance;static standing balance;dynamic sitting balance  -HK           Static Sitting Balance    Level of [place] (Unsupported Sitting, Static Balance)  contact guard assist  -HK        Sitting Position (Unsupported Sitting, Static Balance)  sitting on edge of bed  -HK        Time Able to Maintain Position (Unsupported Sitting, Static Balance)  more than 5 minutes  -HK           Dynamic Sitting Balance    Level of [place], Reaches Outside Midline (Sitting, Dynamic Balance)  contact guard assist  -HK        Sitting Position, Reaches Outside Midline (Sitting, Dynamic Balance)  other (see comments) BSC  -HK        Comment, Reaches Outside Midline (Sitting, Dynamic Balance)  completing froilan care   -HK           Static Standing Balance    Level of [place] (Supported Standing, Static Balance)  moderate assist, 50 to 74% patient effort  -HK        Time Able to Maintain Position (Supported Standing, Static Balance)  30 to 45 seconds  -HK        Assistive Device Utilized (Supported Standing, Static Balance)  -- BUE support   -HK        Comment (Supported Standing, Static Balance)   Pt with posterior lean   -HK           Fine Motor Testing & Training    Fine Motor Tests  other (see comments) tying gown together   -HK        Comment, Fine Motor Coordination  intact   -HK           Sensory Assessment/Intervention    Sensory General Assessment  no sensation deficits identified  -HK        Additional Documentation  Vision Assessment/Intervention (Group)  -HK           Vision Assessment/Intervention    Visual Impairment/Limitations  corrective lenses full time pt does not have glasses; difficult to assess  -HK           Positioning and Restraints    Pre-Treatment Position  in bed  -HK        Post Treatment Position  chair  -HK        In Chair  notified nsg;reclined;call light within reach;encouraged to call for assist;exit alarm on  -HK           Pain Assessment    Additional Documentation  Pain Scale: Numbers Pre/Post-Treatment (Group)  -HK           Pain Scale: Numbers Pre/Post-Treatment    Pain Scale: Numbers, Pretreatment  0/10 - no pain  -HK        Pain Scale: Numbers, Post-Treatment  0/10 - no pain  -HK           Coping    Observed Emotional State  accepting;calm;cooperative  -HK           Plan of Care Review    Plan of Care Reviewed With  patient  -HK        Progress  improving  -HK           Clinical Impression (OT)    Date of Referral to OT  02/05/20  -HK        OT Diagnosis  Decreased independence with ADLS and Mobility   -HK        Patient/Family Goals Statement (OT Eval)  Pt would like to improve and return home.   -HK        Criteria for Skilled Therapeutic Interventions Met (OT Eval)  yes;treatment indicated  -HK        Rehab Potential (OT Eval)  good, to achieve stated therapy goals  -HK        Therapy Frequency (OT Eval)  daily  -HK        Care Plan Review (OT)  evaluation/treatment results reviewed;care plan/treatment goals reviewed;risks/benefits reviewed;patient/other agree to care plan  -HK        Anticipated Discharge Disposition (OT)  inpatient rehabilitation facility  -HK            Vital Signs    Pre Systolic BP Rehab  158  -HK        Pre Treatment Diastolic BP  113  -HK        Post Systolic BP Rehab  139  -HK        Post Treatment Diastolic BP  108  -HK        Pre Patient Position  Supine  -HK        Intra Patient Position  Standing  -HK        Post Patient Position  Sitting  -HK           OT Goals    Bed Mobility Goal Selection (OT)  bed mobility, OT goal 1  -HK        Transfer Goal Selection (OT)  transfer, OT goal 1  -HK        Toileting Goal Selection (OT)  toileting, OT goal 1  -HK        Grooming Goal Selection (OT)  grooming, OT goal 1  -HK        Additional Documentation  Grooming Goal Selection (OT) (Row)  -HK           Bed Mobility Goal 1 (OT)    Activity/Assistive Device (Bed Mobility Goal 1, OT)  sit to supine/supine to sit;scooting  -HK        Grace Level/Cues Needed (Bed Mobility Goal 1, OT)  standby assist  -HK        Time Frame (Bed Mobility Goal 1, OT)  5 days  -HK        Progress/Outcomes (Bed Mobility Goal 1, OT)  goal ongoing  -HK           Transfer Goal 1 (OT)    Activity/Assistive Device (Transfer Goal 1, OT)  sit-to-stand/stand-to-sit;toilet  -HK        [place]ace Level/Cues Needed (Transfer Goal 1, OT)  contact guard assist  -HK        Time Frame (Transfer Goal 1, OT)  5 days  -HK        Progress/Outcome (Transfer Goal 1, OT)  goal ongoing  -HK           Toileting Goal 1 (OT)    Activity/Device (Toileting Goal 1, OT)  adjust/manage clothing;perform perineal hygiene  -HK        [place]ace Level/Cues Needed (Toileting Goal 1, OT)  standby assist  -HK        Time Frame (Toileting Goal 1, OT)  5 days  -HK        Progress/Outcome (Toileting Goal 1, OT)  goal ongoing  -HK           Grooming Goal 1 (OT)    Activity/Device (Grooming Goal 1, OT)  hair care;oral care;wash face, hands  -HK        [place]ace (Grooming Goal 1, OT)  verbal cues required;minimum assist (75% or more patient effort)  -HK        Time Frame (Grooming Goal 1, OT)  5 days  -HK         "Progress/Outcome (Grooming Goal 1, OT)  goal ongoing  -HK           Living Environment    Home Accessibility  stairs to enter home;tub/shower is not walk in  -HK          User Key  (r) = Recorded By, (t) = Taken By, (c) = Cosigned By    Initials Name Effective Dates    HK Kathy Miller, OT 03/07/18 -                OT Recommendation and Plan  Outcome Summary/Treatment Plan (OT)  Anticipated Discharge Disposition (OT): inpatient rehabilitation facility  Therapy Frequency (OT Eval): daily  Plan of Care Review  Plan of Care Reviewed With: patient  Plan of Care Reviewed With: patient  Outcome Summary: OT eval complete. Pt with frequent complaints of being \"too cold\". Pt completes bed mobility with Juanita and transfers with modAx1 +1 for equipment management. Per pt she requires modA for all dressing and bathing at baseline. Pt completed toileting with setup for froilan care and maxA for clothing management. Pt required Juanita for tray set up but able to self feed independently. Recommend d/c to [facility] when medically ready. If pt returns home recommend 24/7 assist for safety and HH OT.    Outcome Measures     Row Name 02/06/20 0810             How much help from another is currently needed...    Putting on and taking off regular lower body clothing?  2  -HK      Bathing (including washing, rinsing, and drying)  2  -HK      Toileting (which includes using toilet bed pan or urinal)  2  -HK      Putting on and taking off regular upper body clothing  3  -HK      Taking care of personal grooming (such as brushing teeth)  3  -HK      Eating meals  3  -HK      AM-PAC 6 Clicks Score (OT)  15  -HK         Modified Brandon Scale    Modified Timothy Scale  3 - Moderate disability.  Requiring some help, but able to walk without assistance.  -HK         Functional Assessment    Outcome Measure Options  AM-PAC 6 Clicks Daily Activity (OT)  -HK        User Key  (r) = Recorded By, (t) = Taken By, (c) = Cosigned By    Initials Name Provider Type    " " Kathy Miller OT Occupational Therapist          Time Calculation:   Time Calculation- OT     Row Name 02/06/20 0810             Time Calculation- OT    OT Start Time  0810  -HK      OT Received On  02/06/20  -HK      OT Goal Re-Cert Due Date  02/16/20  -         Timed Charges    79179 - OT Self Care/Mgmt Minutes  10  -HK        User Key  (r) = Recorded By, (t) = Taken By, (c) = Cosigned By    Initials Name Provider Type     Kathy Miller OT Occupational Therapist        Therapy Charges for Today     Code Description Service Date Service Provider Modifiers Qty    06618905584 HC OT SELF CARE/MGMT/TRAIN EA 15 MIN 2/6/2020 Kathy Miller, OT GO 1    08833523405 HC OT EVAL LOW COMPLEXITY 3 2/6/2020 Kathy Miller OT GO 1               Kathy Miller OT  2/6/2020    Electronically signed by Kathy Miller OT at 02/06/20 1004     Kathy Miller OT at 02/06/20 1000          Problem: Patient Care Overview  Goal: Plan of Care Review  Outcome: Ongoing (interventions implemented as appropriate)  Flowsheets (Taken 2/6/2020 0810)  Outcome Summary: OT eval complete. Pt with frequent complaints of being \"too cold\". Pt completes bed mobility with Juanita and transfers with modAx1 +1 for equipment management. Per pt she requires modA for all dressing and bathing at baseline. Pt completed toileting with setup for froilan care and maxA for clothing management. Pt required Juanita for tray set up but able to self feed independently. Recommend d/c to [facility] when medically ready. If pt returns home recommend 24/7 assist for safety and  OT.      Electronically signed by Kathy Miller OT at 02/06/20 1000       "

## 2020-02-07 NOTE — PROGRESS NOTES
Continued Stay Note  [facility]     Patient Name: Amber Thurman  MRN: [id number]  Today's Date: 2/7/2020    Admit Date: 2/5/2020    Discharge Plan     Row Name 02/07/20 0943       Plan    Plan  SNF    Patient/Family in Agreement with Plan  yes    Plan Comments  Spoke with Marielle (duaghter in law) and she wants to check with [facility] and [facility]. F[facility] referral [facility] and spoke with Coby with [facility] and made referral. Cm will continue to follow.    Final Discharge Disposition Code  03 - skilled nursing facility (SNF)    Row Name 02/07/20 0858       Plan    Plan  SNF    Post Acute Provider List  Nursing Home    Post Acute Provider Quality & Resource List  Yes    Delivered To  Support Person    Support Person  Marielle Thurman (daughter in law/POA)    N/A Provider List Comment  [facility]    Patient/Family in Agreement with Plan  yes    Plan Comments  Spoke with Kiana at [facility] and made a referral. CM will continue to follow.    Final Discharge Disposition Code  03 - skilled nursing facility (SNF)    Row Name 02/07/20 0849       Plan    Plan  TBD    Patient/Family in Agreement with Plan  yes    Plan Comments  Spoke with patient at bedside. Spoke with Marielle Thurman (daughter in law/POA) Patient wants to go home but Marielle Thurman (daughter in law/POA) wants rehab with possible long term placement. Marielle wants to see if [facility] will accept patient. CM faxed a referral to [facility]. Marielle will call when they are in aptients room and we will talk with the patient. CM will continuie to follow.    Final Discharge Disposition Code  03 - skilled nursing facility (SNF)        Discharge Codes    No documentation.             Atul Campbell, RN

## 2020-02-07 NOTE — PLAN OF CARE
Problem: Patient Care Overview  Goal: Plan of Care Review  Outcome: Ongoing (interventions implemented as appropriate)  Flowsheets  Taken 2/7/2020 1628  Progress: no change  Taken 2/7/2020 1624  Plan of Care Reviewed With: patient  Outcome Summary: IMPROVED BED MOBILITY TODAY. GAIT DISTANCE LIMITED BY FATIGUE. PT AMBULATED 48 FEET WITH R WALKER AND MIN ASSIST. PT CONFUSED RE: SITUATION. KNEW SHE WAS IN HOSPITAL BUT ADAMANT SHE HAD BEEN MOVED TO A NEW ROOM.  RECOMMEND SNF AT D/C.

## 2020-02-07 NOTE — PROGRESS NOTES
Continued Stay Note  [facility]     Patient Name: Amber Thurman  MRN: [id number]  Today's Date: 2/7/2020    Admit Date: 2/5/2020    Discharge Plan     Row Name 02/07/20 1458       Plan    Plan  [facility]    Patient/Family in Agreement with Plan  yes    Plan Comments  Spoke with Marielle and they really want to the patient go to [facility]. Called and left a voicemail with Maliha at [facility]. CM will continue to follow.    Final Discharge Disposition Code  03 - skilled nursing facility (SNF)        Discharge Codes    No documentation.             Atul Campbell RN

## 2020-02-07 NOTE — PROGRESS NOTES
Neurology       Patient Care Team:  Dinh Alan MD as PCP - General (Internal Medicine)    Chief complaint: Weakness    History: Patient feels like she is somewhat better today and would like to go home  Past Medical History:   Diagnosis Date   • Acute UTI (urinary tract infection) 8/23/2019   • Disease of thyroid gland    • Rhabdomyolysis 8/23/2019   • TIA (transient ischemic attack)        Vital Signs   Vitals:    02/06/20 1915 02/07/20 0022 02/07/20 0441 02/07/20 0816   BP: 129/80 132/86 146/94 (!) 156/109   BP Location: Right arm Right arm Right arm Right arm   Patient Position: Lying Lying Lying Lying   Pulse: 95 91 114 110   Resp: 16 16 16 16   Temp: 98 °F (36.7 °C) 98.2 °F (36.8 °C) 97.7 °F (36.5 °C) 98 °F (36.7 °C)   TempSrc: Oral Oral Oral Oral   SpO2:    93%   Weight:       Height:           Physical Exam:   General: Awake and alert              Neuro: Rambling a bit about her family but conversational.  She has no complaints.     are equal.    She is oriented to person and place.        Results Review:  Bilateral carotids are unremarkable  Results from last 7 days   Lab Units 02/06/20  0419   WBC 10*3/mm3 7.63   HEMOGLOBIN g/dL 12.9   HEMATOCRIT % 39.8   PLATELETS 10*3/mm3 198     Results from last 7 days   Lab Units 02/07/20  0458 02/06/20  0419 02/05/20  1403   SODIUM mmol/L 140 140 140   POTASSIUM mmol/L 3.9 3.9 4.0   CHLORIDE mmol/L 107 108* 104   CO2 mmol/L 18.0* 22.0 26.0   BUN mg/dL 16 18 17   CREATININE mg/dL 0.85 0.89 0.85   CALCIUM mg/dL 9.6 8.7 9.0   BILIRUBIN mg/dL  --   --  0.6   ALK PHOS U/L  --   --  72   ALT (SGPT) U/L  --   --  8   AST (SGOT) U/L  --   --  17   GLUCOSE mg/dL 112* 112* 90       Imaging Results (Last 24 Hours)     ** No results found for the last 24 hours. **          Assessment:  2 tiny acute infarcts.  They appear to be asymptomatic at the moment.    Atrial flutter    Plan:  Patient is able to get adequate family assistance she can be discharged  anytime    Comment:  Given the previous decision to avoid anticoagulation not much can be done further.         I discussed the patients findings and my recommendations with patient    Dion Stauffer MD  02/07/20  12:47 PM

## 2020-02-07 NOTE — PROGRESS NOTES
Continued Stay Note  [facility]     Patient Name: Amber Thurman  MRN: [id number]  Today's Date: 2/7/2020    Admit Date: 2/5/2020    Discharge Plan     Row Name 02/07/20 0849       Plan    Plan  TBD    Patient/Family in Agreement with Plan  yes    Plan Comments  Spoke with patient at bedside. Spoke with Marielle Thurman (daughter in law/POA) Patient wants to go home but Marielle Thurman (daughter in law/POA) wants rehab with possible long term placement. Marielle wants to see if [facility] will accept patient. CM faxed a referral to [facility]. Marielle will call when they are in aptients room and we will talk with the patient. CM will continuie to follow.    Final Discharge Disposition Code  03 - skilled nursing facility (SNF)        Discharge Codes    No documentation.             Atul Campbell RN

## 2020-02-07 NOTE — PLAN OF CARE
Problem: Patient Care Overview  Goal: Plan of Care Review  Outcome: Ongoing (interventions implemented as appropriate)   Patient needs 2 assist to ambulate in room.  Pt having most difficulty sitting to standing.  Once upright patient can ambulate with walker with minimal assistance.  Pt had loose stools today.  Pt denies pain.  Pt forgetful and needs frequent reminding.

## 2020-02-07 NOTE — PROGRESS NOTES
"    [facility] Medicine Services  PROGRESS NOTE    Patient Name: Amber Thurman  : 3/13/1930  MRN: [id number]    Date of Admission: 2020  Primary Care Physician: Dinh Alan MD    Subjective   Subjective     CC:  Follow up weakness    HPI:  Discussion with patient, son, and DIL at bedside about anticoagulation risks/benefits, they have decided not to anticoagulate. She reports feeling a bit stronger, looks better to family. Breathing is \"fair.\" Tolerating diet. Family requesting placement. HR acutely spiked to 150's and resolved within 60 seconds.    Review of Systems  Gen- No fevers, chills  CV- No chest pain, palpitations  Resp- No cough, dyspnea  GI- No N/V/D, abd pain    Objective   Objective     Vital Signs:   Temp:  [97.7 °F (36.5 °C)-98.2 °F (36.8 °C)] 98.2 °F (36.8 °C)  Heart Rate:  [] 95  Resp:  [16] 16  BP: (124-156)/() 124/92  Total (NIH Stroke Scale): 3     Physical Exam:  Constitutional: No acute distress, awake, alert, frail appearing older woman sitting in bedside chair  HENT: NCAT, mucous membranes moist  Respiratory: Clear to auscultation bilaterally, respiratory effort normal   Cardiovascular: Borderline tachycardic, RR, no murmurs, rubs, or gallops, palpable radial pulses bilaterally  Gastrointestinal: Positive bowel sounds, soft, nontender, nondistended  Musculoskeletal: No bilateral ankle edema  Psychiatric: Pleasantly demented  Neurologic: Strength is symmetrical, no slurring of speech    Results Reviewed:  Results from last 7 days   Lab Units 20  0419 20  1403   WBC 10*3/mm3 7.63 9.26   HEMOGLOBIN g/dL 12.9 13.7   HEMATOCRIT % 39.8 42.6   PLATELETS 10*3/mm3 198 225     Results from last 7 days   Lab Units 20  0458 20  0419 20  1403   SODIUM mmol/L 140 140 140   POTASSIUM mmol/L 3.9 3.9 4.0   CHLORIDE mmol/L 107 108* 104   CO2 mmol/L 18.0* 22.0 26.0   BUN mg/dL 16 18 17   CREATININE mg/dL 0.85 0.89 0.85   GLUCOSE " mg/dL 112* 112* 90   CALCIUM mg/dL 9.6 8.7 9.0   ALT (SGPT) U/L  --   --  8   AST (SGOT) U/L  --   --  17   TROPONIN T ng/mL  --   --  0.020   PROBNP pg/mL  --   --  201.2     Estimated Creatinine Clearance: 33.4 mL/min (by C-G formula based on SCr of 0.85 mg/dL).    Microbiology Results Abnormal     Procedure Component Value - Date/Time    Blood Culture - Blood, Arm, Right [[id number]] Collected:  02/05/20 1530    Lab Status:  Preliminary result Specimen:  Blood from Arm, Right Updated:  02/07/20 1616     Blood Culture No growth at 2 days    Blood Culture - Blood, Arm, Left [[id number]] Collected:  02/05/20 1540    Lab Status:  Preliminary result Specimen:  Blood from Arm, Left Updated:  02/07/20 1616     Blood Culture No growth at 2 days          Imaging Results (Last 24 Hours)     ** No results found for the last 24 hours. **          Results for orders placed during the hospital encounter of 02/05/20   Adult Transthoracic Echo Complete W/ Cont if Necessary Per Protocol (With Agitated Saline)    Narrative · Estimated EF = 65%.  · Left ventricular systolic function is normal.  · Mild-to-moderate mitral valve regurgitation is present  · Saline test for shunting not performed as she had no IV          I have reviewed the medications:  Scheduled Meds:  amLODIPine 10 mg Oral Q24H   atorvastatin 80 mg Oral Nightly   levothyroxine 37.5 mcg Oral Q AM   sodium chloride 10 mL Intravenous Q12H     Continuous Infusions:   PRN Meds:.•  acetaminophen **OR** acetaminophen **OR** acetaminophen  •  melatonin  •  ondansetron **OR** ondansetron  •  sodium chloride    Assessment/Plan   Assessment & Plan     Active Hospital Problems    Diagnosis  POA   • **CVA (cerebral vascular accident) (CMS/HCC) [I63.9]  Yes   • Generalized weakness [R53.1]  Yes   • Hypothyroidism (acquired) [E03.9]  Yes      Resolved Hospital Problems   No resolved problems to display.        Brief Hospital Course to date:  Amber Thurman is a 89 y.o. female with  "Hx remote TIA, hypothyroidism, peripheral neuropathy, Aflutter not on anticoagulation due to falls at home who was brought in by family for overall decline over several days found to have had 2 recent strokes now awaiting placement for SNF     Assessment/Plan     Generalized weakness  - PT/OT  - improved some with therapy and IVF's  - rehab/skilled placement     Acute bilateral strokes  - MRI with small bilateral parietal insults, significant ischemic small vessel disease  - \"allergy\" to ASA so not given  - cont statin  -neurology following  - discussed with patient and family, they would not like anticoagulation at this time as she has frequent falls and they are concerned about bleeding  - echo EF 65%, no bubble performed as she apparently did not have IV access, no other substantial findings     Hypothyroidism  - cont home synthroid     Hx Aflutter  - likely in and out of aflutter at home  - no AC for discussion noted above  - currently rate controlled     DVT Prophylaxis:  mechanical    Disposition: I expect the patient to be discharged pending SNF placement.    CODE STATUS:   Code Status and Medical Interventions:   Ordered at: 02/05/20 2029     Code Status:    No CPR     Medical Interventions (Level of Support Prior to Arrest):    Full     Comments:    DNR/ DNI         Electronically signed by J Luis Morfin DO, 02/07/20, 4:24 PM.      "

## 2020-02-08 PROCEDURE — 99232 SBSQ HOSP IP/OBS MODERATE 35: CPT | Performed by: INTERNAL MEDICINE

## 2020-02-08 RX ADMIN — LEVOTHYROXINE SODIUM 37.5 MCG: 0.07 TABLET ORAL at 06:02

## 2020-02-08 RX ADMIN — MELATONIN TAB 5 MG 5 MG: 5 TAB at 21:34

## 2020-02-08 RX ADMIN — ATORVASTATIN CALCIUM 80 MG: 40 TABLET, FILM COATED ORAL at 21:31

## 2020-02-08 RX ADMIN — ACETAMINOPHEN 650 MG: 325 TABLET, FILM COATED ORAL at 21:34

## 2020-02-08 RX ADMIN — AMLODIPINE BESYLATE 10 MG: 10 TABLET ORAL at 09:29

## 2020-02-08 NOTE — PLAN OF CARE
Problem: Fall Risk (Adult)  Goal: Identify Related Risk Factors and Signs and Symptoms  Outcome: Ongoing (interventions implemented as appropriate)  Flowsheets (Taken 2/8/2020 0326)  Related Risk Factors (Fall Risk): age-related changes;fatigue/slow reaction;gait/mobility problems;environment unfamiliar;neuro disease/injury  Signs and Symptoms (Fall Risk): presence of risk factors     Problem: Patient Care Overview  Goal: Plan of Care Review  Outcome: Ongoing (interventions implemented as appropriate)  Flowsheets (Taken 2/8/2020 0326)  Progress: improving  Plan of Care Reviewed With: patient  Outcome Summary: Pt A&Ox4. VSS on RA. NIH: 0. NSR on tele. Pt has been up to BSC with assist x1. Plan is d/c to SNF. Will continue to monitor.

## 2020-02-08 NOTE — PROGRESS NOTES
[facility] Medicine Services  PROGRESS NOTE    Patient Name: Amber Thurman  : 3/13/1930  MRN: [id number]    Date of Admission: 2020  Primary Care Physician: Dinh Alan MD    Subjective   Subjective     CC: f/u CVA    HPI: Up in bed eating without family in room. She has no complaints/concerns.    Review of Systems  Gen- No fevers, chills  CV- No chest pain, palpitations  Resp- No cough, dyspnea  GI- No N/V/D, abd pain    Objective   Objective     Vital Signs:   Temp:  [97.4 °F (36.3 °C)-98.2 °F (36.8 °C)] 97.9 °F (36.6 °C)  Heart Rate:  [79-95] 79  Resp:  [16-18] 16  BP: (124-147)/(85-92) 140/92  Total (NIH Stroke Scale): 0     Physical Exam:  Constitutional: No acute distress, awake, alert, frail appearing  HENT: NCAT, mucous membranes moist  Respiratory: Clear to auscultation bilaterally, respiratory effort normal   Cardiovascular: RRR, no murmurs, rubs, or gallops, palpable pedal pulses bilaterally  Gastrointestinal: Positive bowel sounds, soft, nontender, nondistended  Musculoskeletal: No bilateral ankle edema  Psychiatric: Appropriate affect, cooperative  Neurologic: Oriented x 3, strength symmetric in all extremities, Cranial Nerves grossly intact to confrontation, speech clear  Skin: No rashes    Results Reviewed:  Results from last 7 days   Lab Units 20  0419 20  1403   WBC 10*3/mm3 7.63 9.26   HEMOGLOBIN g/dL 12.9 13.7   HEMATOCRIT % 39.8 42.6   PLATELETS 10*3/mm3 198 225     Results from last 7 days   Lab Units 20  0458 20  0419 20  1403   SODIUM mmol/L 140 140 140   POTASSIUM mmol/L 3.9 3.9 4.0   CHLORIDE mmol/L 107 108* 104   CO2 mmol/L 18.0* 22.0 26.0   BUN mg/dL 16 18 17   CREATININE mg/dL 0.85 0.89 0.85   GLUCOSE mg/dL 112* 112* 90   CALCIUM mg/dL 9.6 8.7 9.0   ALT (SGPT) U/L  --   --  8   AST (SGOT) U/L  --   --  17   TROPONIN T ng/mL  --   --  0.020   PROBNP pg/mL  --   --  201.2     Estimated Creatinine Clearance: 33.4  mL/min (by C-G formula based on SCr of 0.85 mg/dL).    Microbiology Results Abnormal     Procedure Component Value - Date/Time    Blood Culture - Blood, Arm, Right [[id number]] Collected:  02/05/20 1530    Lab Status:  Preliminary result Specimen:  Blood from Arm, Right Updated:  02/07/20 1616     Blood Culture No growth at 2 days    Blood Culture - Blood, Arm, Left [[id number]] Collected:  02/05/20 1540    Lab Status:  Preliminary result Specimen:  Blood from Arm, Left Updated:  02/07/20 1616     Blood Culture No growth at 2 days          Results for orders placed during the hospital encounter of 02/05/20   Adult Transthoracic Echo Complete W/ Cont if Necessary Per Protocol (With Agitated Saline)    Narrative · Estimated EF = 65%.  · Left ventricular systolic function is normal.  · Mild-to-moderate mitral valve regurgitation is present  · Saline test for shunting not performed as she had no IV          I have reviewed the medications:  Scheduled Meds:  amLODIPine 10 mg Oral Q24H   atorvastatin 80 mg Oral Nightly   levothyroxine 37.5 mcg Oral Q AM   sodium chloride 10 mL Intravenous Q12H     Continuous Infusions:   PRN Meds:.•  acetaminophen **OR** acetaminophen **OR** acetaminophen  •  melatonin  •  ondansetron **OR** ondansetron  •  sodium chloride    Assessment/Plan   Assessment & Plan     Active Hospital Problems    Diagnosis  POA   • **CVA (cerebral vascular accident) (CMS/[facility]) [I63.9]  Yes   • Generalized weakness [R53.1]  Yes   • Hypothyroidism (acquired) [E03.9]  Yes      Resolved Hospital Problems   No resolved problems to display.        Brief Hospital Course to date:  Amber Thurman is a 89 y.o. female with Hx remote TIA, hypothyroidism, peripheral neuropathy, Aflutter not on anticoagulation due to falls at home who was brought in by family for overall decline over several days found to have had 2 recent strokes now awaiting placement for SNF. This is my first day assessing patient's active medical  "issues.     Assessment/Plan     Generalized weakness  - Seemingly better since arrival per notes. This am she blames most of this on her age.  - PT/OT  - improved some with therapy and IVF's  - rehab/skilled placement     Acute bilateral strokes  - MRI with small bilateral parietal insults, significant ischemic small vessel disease  - \"allergy\" to ASA so not given. cont statin  - neurology following  - discussed with patient and family, they would not like anticoagulation at this time as she has frequent falls and they are concerned about bleeding  - echo EF 65%, no bubble performed as she apparently did not have IV access, no other substantial findings    Mild metabolic acidosis  - Suspect hyperchloremic acidosis. Monitor periodically now that off IVF. BMP in am.     Hypothyroidism  - cont home synthroid     Hx Aflutter  - likely in and out of aflutter at home  - no AC for discussion noted above  - currently rate controlled     DVT Prophylaxis:  mechanical     Disposition: I expect the patient to be discharged pending SNF placement.    CODE STATUS:   Code Status and Medical Interventions:   Ordered at: 02/05/20 2029     Code Status:    No CPR     Medical Interventions (Level of Support Prior to Arrest):    Full     Comments:    DNR/ DNI         Electronically signed by Ling Warner II, DO, 02/08/20, 11:15 AM.    "

## 2020-02-09 PROCEDURE — 99232 SBSQ HOSP IP/OBS MODERATE 35: CPT | Performed by: PHYSICIAN ASSISTANT

## 2020-02-09 RX ADMIN — ACETAMINOPHEN 650 MG: 325 TABLET, FILM COATED ORAL at 10:23

## 2020-02-09 RX ADMIN — ATORVASTATIN CALCIUM 80 MG: 40 TABLET, FILM COATED ORAL at 22:22

## 2020-02-09 RX ADMIN — LEVOTHYROXINE SODIUM 37.5 MCG: 0.07 TABLET ORAL at 06:38

## 2020-02-09 RX ADMIN — AMLODIPINE BESYLATE 10 MG: 10 TABLET ORAL at 10:08

## 2020-02-09 RX ADMIN — MELATONIN TAB 5 MG 5 MG: 5 TAB at 22:22

## 2020-02-09 RX ADMIN — SODIUM CHLORIDE, PRESERVATIVE FREE 10 ML: 5 INJECTION INTRAVENOUS at 22:22

## 2020-02-09 NOTE — PLAN OF CARE
Problem: Patient Care Overview  Goal: Plan of Care Review  Outcome: Ongoing (interventions implemented as appropriate)  Goal: Individualization and Mutuality  Outcome: Ongoing (interventions implemented as appropriate)  Goal: Discharge Needs Assessment  Outcome: Ongoing (interventions implemented as appropriate)  Goal: Interprofessional Rounds/Family Conf  Outcome: Ongoing (interventions implemented as appropriate)     Problem: Fall Risk (Adult)  Goal: Identify Related Risk Factors and Signs and Symptoms  Outcome: Ongoing (interventions implemented as appropriate)  Goal: Absence of Fall  Outcome: Ongoing (interventions implemented as appropriate)

## 2020-02-09 NOTE — PLAN OF CARE
Problem: Patient Care Overview  Goal: Plan of Care Review  Outcome: Ongoing (interventions implemented as appropriate)  Flowsheets (Taken 2/9/2020 0126)  Outcome Summary: Pt has rested well with no new complaints. VSS on RA. NSR on tele. NIH: 0. Up to BSC with assist x1. Family at bedside. Waiting placement to SNF. Will continue to monitor.     Problem: Fall Risk (Adult)  Goal: Identify Related Risk Factors and Signs and Symptoms  Outcome: Ongoing (interventions implemented as appropriate)  Flowsheets (Taken 2/8/2020 0326)  Related Risk Factors (Fall Risk): age-related changes;fatigue/slow reaction;gait/mobility problems;environment unfamiliar;neuro disease/injury  Signs and Symptoms (Fall Risk): presence of risk factors

## 2020-02-09 NOTE — PROGRESS NOTES
[facility] Medicine Services  PROGRESS NOTE    Patient Name: Amber Thurman  : 3/13/1930  MRN: [id number]    Date of Admission: 2020  Primary Care Physician: Dinh Alan MD    Subjective   Subjective     CC:  F/u CVA      HPI:  Ms. Thurman has no complaints today.  She states she slept well last night and had breakfast and lunch this afternoon.  She reports her daughter is coming to visit her today.  She informs me of her continued brayan in [place], despite all she's been through, losing a son and .      Review of Systems  Gen- No fevers, chills  CV- No chest pain, palpitations  Resp- No cough, dyspnea  GI- No N/V/D, abd pain        Objective   Objective     Vital Signs:   Temp:  [97.4 °F (36.3 °C)-97.9 °F (36.6 °C)] 97.9 °F (36.6 °C)  Heart Rate:  [] 81  Resp:  [16] 16  BP: (111-130)/(79-92) 127/87  Total (NIH Stroke Scale): 1     Physical Exam:  Constitutional: No acute distress, awake, alert  HENT: NCAT, mucous membranes moist  Respiratory: Clear to auscultation bilaterally, respiratory effort normal   Cardiovascular: RRR, no murmurs, rubs, or gallops, palpable pedal pulses bilaterally  Gastrointestinal: Positive bowel sounds, soft, nontender, nondistended  Musculoskeletal: No bilateral ankle edema  Psychiatric: Appropriate affect, cooperative  Neurologic: Oriented x 3, strength symmetric in all extremities, Cranial Nerves grossly intact to confrontation, speech clear    Results Reviewed:  Results from last 7 days   Lab Units 20  0419 20  1403   WBC 10*3/mm3 7.63 9.26   HEMOGLOBIN g/dL 12.9 13.7   HEMATOCRIT % 39.8 42.6   PLATELETS 10*3/mm3 198 225     Results from last 7 days   Lab Units 20  0458 20  0419 20  1403   SODIUM mmol/L 140 140 140   POTASSIUM mmol/L 3.9 3.9 4.0   CHLORIDE mmol/L 107 108* 104   CO2 mmol/L 18.0* 22.0 26.0   BUN mg/dL 16 18 17   CREATININE mg/dL 0.85 0.89 0.85   GLUCOSE mg/dL 112* 112* 90   CALCIUM mg/dL 9.6  8.7 9.0   ALT (SGPT) U/L  --   --  8   AST (SGOT) U/L  --   --  17   TROPONIN T ng/mL  --   --  0.020   PROBNP pg/mL  --   --  201.2     Estimated Creatinine Clearance: 33.4 mL/min (by C-G formula based on SCr of 0.85 mg/dL).    Microbiology Results Abnormal     Procedure Component Value - Date/Time    Blood Culture - Blood, Arm, Right [[id number]] Collected:  02/05/20 1530    Lab Status:  Preliminary result Specimen:  Blood from Arm, Right Updated:  02/08/20 1616     Blood Culture No growth at 3 days    Blood Culture - Blood, Arm, Left [[id number]] Collected:  02/05/20 1540    Lab Status:  Preliminary result Specimen:  Blood from Arm, Left Updated:  02/08/20 1616     Blood Culture No growth at 3 days          Imaging Results (Last 24 Hours)     ** No results found for the last 24 hours. **          Results for orders placed during the hospital encounter of 02/05/20   Adult Transthoracic Echo Complete W/ Cont if Necessary Per Protocol (With Agitated Saline)    Narrative · Estimated EF = 65%.  · Left ventricular systolic function is normal.  · Mild-to-moderate mitral valve regurgitation is present  · Saline test for shunting not performed as she had no IV          I have reviewed the medications:  Scheduled Meds:  amLODIPine 10 mg Oral Q24H   atorvastatin 80 mg Oral Nightly   levothyroxine 37.5 mcg Oral Q AM   sodium chloride 10 mL Intravenous Q12H     Continuous Infusions:   PRN Meds:.•  acetaminophen **OR** acetaminophen **OR** acetaminophen  •  melatonin  •  ondansetron **OR** ondansetron  •  sodium chloride    Assessment/Plan   Assessment & Plan     Active Hospital Problems    Diagnosis  POA   • **CVA (cerebral vascular accident) (CMS/HCC) [I63.9]  Yes   • Generalized weakness [R53.1]  Yes   • Hypothyroidism (acquired) [E03.9]  Yes      Resolved Hospital Problems   No resolved problems to display.        Brief Hospital Course to date:  Amber Thurman is a 89 y.o. female with past medical history of remote TIA,  "hypothyroidism, peripheral neuropathy, Aflutter not on anticoagulation due to falls at home who was brought in by family for overall decline over several days found to have had 2 recent strokes now awaiting placement for SNF. This is my first day assessing patient's active medical issues.     Assessment/Plan     Generalized weakness  - Seemingly better since arrival per notes.   - PT/OT  - improved some with therapy and IVF's  - rehab/skilled placement     Acute bilateral strokes  - MRI with small bilateral parietal insults, significant ischemic small vessel disease  - \"allergy\" to ASA so not given. cont statin  - neurology following  - discussed with patient and family, they would not like anticoagulation at this time as she has frequent falls and they are concerned about bleeding  - echo EF 65%, no bubble performed as she apparently did not have IV access, no other substantial findings     Mild metabolic acidosis  - will repeat BMP tomorrow.     Hypothyroidism  - cont home synthroid     Hx Aflutter  - likely in and out of aflutter at home  - no AC for discussion noted above  - currently rate controlled     DVT Prophylaxis:  mechanical     Disposition: I expect the patient to be discharged pending SNF placement.    CODE STATUS:   Code Status and Medical Interventions:   Ordered at: 02/05/20 2029     Code Status:    No CPR     Medical Interventions (Level of Support Prior to Arrest):    Full     Comments:    DNR/ DNI         Electronically signed by Peyton Goodman PA-C, 02/09/20, 3:37 PM.      "

## 2020-02-10 LAB
ANION GAP SERPL CALCULATED.3IONS-SCNC: 11 MMOL/L (ref 5–15)
BACTERIA SPEC AEROBE CULT: NORMAL
BACTERIA SPEC AEROBE CULT: NORMAL
BUN BLD-MCNC: 18 MG/DL (ref 8–23)
BUN/CREAT SERPL: 22 (ref 7–25)
CALCIUM SPEC-SCNC: 8.7 MG/DL (ref 8.6–10.5)
CHLORIDE SERPL-SCNC: 107 MMOL/L (ref 98–107)
CO2 SERPL-SCNC: 22 MMOL/L (ref 22–29)
CREAT BLD-MCNC: 0.82 MG/DL (ref 0.57–1)
GFR SERPL CREATININE-BSD FRML MDRD: 66 ML/MIN/1.73
GLUCOSE BLD-MCNC: 79 MG/DL (ref 65–99)
POTASSIUM BLD-SCNC: 4.4 MMOL/L (ref 3.5–5.2)
SODIUM BLD-SCNC: 140 MMOL/L (ref 136–145)

## 2020-02-10 PROCEDURE — 97110 THERAPEUTIC EXERCISES: CPT

## 2020-02-10 PROCEDURE — 99232 SBSQ HOSP IP/OBS MODERATE 35: CPT | Performed by: PHYSICIAN ASSISTANT

## 2020-02-10 PROCEDURE — 80048 BASIC METABOLIC PNL TOTAL CA: CPT | Performed by: PHYSICIAN ASSISTANT

## 2020-02-10 PROCEDURE — 97116 GAIT TRAINING THERAPY: CPT

## 2020-02-10 RX ADMIN — MELATONIN TAB 5 MG 5 MG: 5 TAB at 21:12

## 2020-02-10 RX ADMIN — AMLODIPINE BESYLATE 10 MG: 10 TABLET ORAL at 09:23

## 2020-02-10 RX ADMIN — ATORVASTATIN CALCIUM 80 MG: 40 TABLET, FILM COATED ORAL at 21:12

## 2020-02-10 RX ADMIN — SODIUM CHLORIDE, PRESERVATIVE FREE 10 ML: 5 INJECTION INTRAVENOUS at 21:12

## 2020-02-10 RX ADMIN — LEVOTHYROXINE SODIUM 37.5 MCG: 0.07 TABLET ORAL at 05:53

## 2020-02-10 NOTE — PLAN OF CARE
Problem: Patient Care Overview  Goal: Plan of Care Review  2/10/2020 1153 by Brittni Dykes, PT  Outcome: Ongoing (interventions implemented as appropriate)  Flowsheets  Taken 2/8/2020 0326 by Magen Nava RN  Progress: improving  Taken 2/10/2020 0800 by Gloria Jalloh RN  Plan of Care Reviewed With: patient  Taken 2/10/2020 1148 by Brittni Dykes, PT  Outcome Summary: INCREASED DISTANCE AMBULATED AND IMPROVING WITH BED MOBILITY AND TRANSFERS. GOOD EFFORT BUT IS LIMITED BY FATIGUE. AMBULATED 100 FEET WITH R WALKER AND MIN ASSIST. RECOMMEND SNF AT D/C.

## 2020-02-10 NOTE — THERAPY TREATMENT NOTE
Patient Name: Amber Thurman  : 3/13/1930    MRN: [id number]                              Today's Date: 2/10/2020       Admit Date: 2020    Visit Dx:     ICD-10-CM ICD-9-CM   1. Generalized weakness R53.1 780.79   2. At risk for falls Z91.81 V15.88   3. Impaired mobility and ADLs Z74.09 799.89   4. Cognitive communication deficit R41.841 799.52     Patient Active Problem List   Diagnosis   • Atrial flutter (CMS/HCC)   • Hypothyroidism (acquired)   • Generalized weakness   • CVA (cerebral vascular accident) (CMS/HCC)     Past Medical History:   Diagnosis Date   • Acute UTI (urinary tract infection) 2019   • Disease of thyroid gland    • Rhabdomyolysis 2019   • TIA (transient ischemic attack)      Past Surgical History:   Procedure Laterality Date   • ANKLE SURGERY     • BACK SURGERY     • FRACTURE SURGERY     • HERNIA REPAIR       General Information     Row Name 02/10/20 1144          PT Evaluation Time/Intention    Document Type  therapy note (daily note)  -CD     Mode of Treatment  physical therapy  -CD     Row Name 02/10/20 1144          General Information    Patient Profile Reviewed?  yes  -CD     Existing Precautions/Restrictions  fall  -CD     Row Name 02/10/20 1144          Cognitive Assessment/Intervention- PT/OT    Orientation Status (Cognition)  oriented to;person;place  -CD     Cognitive Assessment/Intervention Comment  PT FOLLOWING ALL COMMANDS. NEEDS ENCOURAGEMENT.   -CD     Row Name 02/10/20 1144          Safety Issues, Functional Mobility    Safety Issues Affecting Function (Mobility)  insight into deficits/self awareness;safety precautions follow-through/compliance  -CD     Impairments Affecting Function (Mobility)  balance;endurance/activity tolerance;strength  -CD     Comment, Safety Issues/Impairments (Mobility)  FEARFUL OF FALLING.   -CD       User Key  (r) = Recorded By, (t) = Taken By, (c) = Cosigned By    Initials Name Provider Type    CD Brittni Dykes, PT Physical Therapist         Mobility     Row Name 02/10/20 1145          Bed Mobility Assessment/Treatment    Supine-Sit [place] (Bed Mobility)  contact guard;verbal cues  -CD     Assistive Device (Bed Mobility)  bed rails;head of bed elevated  -CD     Comment (Bed Mobility)  INCREASED TIME AND EFFORT.   -CD     Row Name 02/10/20 1145          Transfer Assessment/Treatment    Comment (Transfers)  CUES FOR HAND PLACEMENT.   -CD     Row Name 02/10/20 1145          Sit-Stand Transfer    Sit-Stand [place] (Transfers)  minimum assist (75% patient effort);verbal cues  -CD     Assistive Device (Sit-Stand Transfers)  walker, front-wheeled  -CD     Row Name 02/10/20 1145          Gait/Stairs Assessment/Training    Gait/Stairs Assessment/Training  gait/ambulation independence  -CD     [place] Level (Gait)  minimum assist (75% patient effort)  -CD     Assistive Device (Gait)  walker, front-wheeled  -CD     Distance in Feet (Gait)  100 FEET.   -CD     Deviations/Abnormal Patterns (Gait)  gait speed decreased;stride length decreased;dwayne decreased  -CD     Left Sided Gait Deviations  forward flexed posture  -CD     Comment (Gait/Stairs)  MANUAL ASSIST TO GUIDE R WALKER. CUES FOR WALKER PLACMENT AND UPRIGHT POSTURE. DISTANCE LIMITED BY FATIGUE.   -CD       User Key  (r) = Recorded By, (t) = Taken By, (c) = Cosigned By    Initials Name Provider Type    CD Brittni Dykes PT Physical Therapist        Obj/Interventions     Row Name 02/10/20 1147          Therapeutic Exercise    Lower Extremity (Therapeutic Exercise)  marching while seated;LAQ (long arc quad), bilateral;SLR (straight leg raise), bilateral  -CD     Lower Extremity Range of Motion (Therapeutic Exercise)  ankle dorsiflexion/plantar flexion, bilateral  -CD     Position (Therapeutic Exercise)  seated  -CD     Sets/Reps (Therapeutic Exercise)  1 SET OF 10 REPS.   -CD     Row Name 02/10/20 1147          Static Sitting Balance    Level of [place] (Unsupported Sitting,  Static Balance)  supervision  -CD     Sitting Position (Unsupported Sitting, Static Balance)  sitting on edge of bed  -CD     Time Able to Maintain Position (Unsupported Sitting, Static Balance)  more than 5 minutes  -CD     Row Name 02/10/20 1147          Dynamic Sitting Balance    Level of [place], Reaches Outside Midline (Sitting, Dynamic Balance)  contact guard assist  -CD     Sitting Position, Reaches Outside Midline (Sitting, Dynamic Balance)  sitting on edge of bed  -CD     Comment, Reaches Outside Midline (Sitting, Dynamic Balance)  SCOOTING   -CD     Row Name 02/10/20 1147          Static Standing Balance    Level of [place] (Supported Standing, Static Balance)  contact guard assist  -CD     Assistive Device Utilized (Supported Standing, Static Balance)  walker, rolling  -CD     Row Name 02/10/20 1147          Dynamic Standing Balance    Level of [place], Reaches Outside Midline (Standing, Dynamic Balance)  moderate assist, 50 to 74% patient effort  -CD     Assistive Device Utilized (Supported Standing, Dynamic Balance)  walker, rolling  -CD     Comment, Reaches Outside Midline (Standing, Dynamic Balance)  GAIT IN [place].   -CD       User Key  (r) = Recorded By, (t) = Taken By, (c) = Cosigned By    Initials Name Provider Type    CD Brittni Dykes PT Physical Therapist        Goals/Plan    No documentation.       Clinical Impression     Row Name 02/10/20 1148          Pain Assessment    Additional Documentation  Pain Scale: Numbers Pre/Post-Treatment (Group)  -CD     [facility] Name 02/10/20 1148          Pain Scale: Numbers Pre/Post-Treatment    Pain Scale: Numbers, Pretreatment  0/10 - no pain  -CD     Pain Scale: Numbers, Post-Treatment  0/10 - no pain  -CD     [facility] Name 02/10/20 1148          Plan of Care Review    Outcome Summary  INCREASED DISTANCE AMBULATED AND IMPROVING WITH BED MOBILITY AND TRANSFERS. GOOD EFFORT BUT IS LIMITED BY FATIGUE. AMBULATED 100 FEET WITH R WALKER AND MIN ASSIST.  RECOMMEND SNF AT D/C.   -CD     Row Name 02/10/20 1148          Physical Therapy Clinical Impression    Patient/Family Goals Statement (PT Clinical Impression)  DID NOT STATE. PER CHAIRT PLAN IS TO GO TO SNF .  -CD     Criteria for Skilled Interventions Met (PT Clinical Impression)  yes  -CD     Rehab Potential (PT Clinical Summary)  --  -CD     Row Name 02/10/20 1148          Vital Signs    Pre Systolic BP Rehab  -- VSS. NSG CLEARED FOR TREATMENT.   -CD     Row Name 02/10/20 1148          Positioning and Restraints    Pre-Treatment Position  in bed  -CD     Post Treatment Position  chair  -CD     In Chair  reclined;call light within reach;encouraged to call for assist;exit alarm on;with family/caregiver;legs elevated;RUE elevated;LUE elevated  -CD       User Key  (r) = Recorded By, (t) = Taken By, (c) = Cosigned By    Initials Name Provider Type    CD Brittni Dykes, PT Physical Therapist        Outcome Measures     Row Name 02/10/20 1151          How much help from another person do you currently need...    Turning from your back to your side while in flat bed without using bedrails?  3  -CD     Moving from lying on back to sitting on the side of a flat bed without bedrails?  3  -CD     Moving to and from a bed to a chair (including a wheelchair)?  3  -CD     Climbing 3-5 steps with a railing?  2  -CD     To walk in hospital room?  3  -CD     Row Name 02/10/20 1151          Modified Timothy Scale    Modified [place] Scale  4 - Moderately severe disability.  Unable to walk without assistance, and unable to attend to own bodily needs without assistance.  -CD     Row Name 02/10/20 1151          Functional Assessment    Outcome Measure Options  AM-PAC 6 Clicks Basic Mobility (PT)  -CD       User Key  (r) = Recorded By, (t) = Taken By, (c) = Cosigned By    Initials Name Provider Type    CD Brittni Dykes, PT Physical Therapist          PT Recommendation and Plan  Planned Therapy Interventions (PT Eval): balance training,  bed mobility training, gait training, home exercise program, transfer training, strengthening  Outcome Summary/Treatment Plan (PT)  Anticipated Discharge Disposition (PT): skilled nursing facility  Plan of Care Reviewed With: patient  Progress: no change  Outcome Summary: INCREASED DISTANCE AMBULATED AND IMPROVING WITH BED MOBILITY AND TRANSFERS. GOOD EFFORT BUT IS LIMITED BY FATIGUE. AMBULATED 100 FEET WITH R WALKER AND MIN ASSIST. RECOMMEND SNF AT D/C.      Time Calculation:   PT Charges     Row Name 02/10/20 1154             Time Calculation    Start Time  1008  -CD      PT Received On  02/10/20  -CD      PT Goal Re-Cert Due Date  02/16/20  -CD         Time Calculation- PT    Total Timed Code Minutes- PT  23 minute(s)  -CD         Timed Charges    52210 - PT Therapeutic Exercise Minutes  8  -CD      34459 - Gait Training Minutes   15  -CD        User Key  (r) = Recorded By, (t) = Taken By, (c) = Cosigned By    Initials Name Provider Type    CD Brittni Dykes, PT Physical Therapist        Therapy Charges for Today     Code Description Service Date Service Provider Modifiers Qty    09299150677 HC PT THER PROC EA 15 MIN 2/10/2020 Brittni Dykes, PT GP 1    04231574525 HC GAIT TRAINING EA 15 MIN 2/10/2020 Brittni Dykes, PT GP 1          PT G-Codes  Outcome Measure Options: AM-PAC 6 Clicks Basic Mobility (PT)  AM-PAC 6 Clicks Score (PT): 17  AM-PAC 6 Clicks Score (OT): 15  Modified Timothy Scale: 4 - Moderately severe disability.  Unable to walk without assistance, and unable to attend to own bodily needs without assistance.    Brittni Dykes, PT  2/10/2020

## 2020-02-10 NOTE — PROGRESS NOTES
Continued Stay Note  [facility]     Patient Name: Amber Thurman  MRN: [id number]  Today's Date: 2/10/2020    Admit Date: 2/5/2020    Discharge Plan     Row Name 02/10/20 1526       Plan    Plan  [facility]    Patient/Family in Agreement with Plan  yes    Plan Comments  Spoke with Marielle and they choosing to go to [facility]. Spoke with Kelly at [facility] and we will plan on the patient going there tomorrow afternoon. CM will continue to follow.    Final Discharge Disposition Code  03 - skilled nursing facility (SNF)    Row Name 02/10/20 1443       Plan    Plan  [facility]    Plan Comments  Spoke with Jovana at [facility] and they can offer the patient a bed. Spoke with Marielle (daughter in law) and she wants to look at the facility before accepting the bed. Marielle is going to look at the facility and will get back to me this afternoon. Spoke with Kelly at [facility] and they have a semi private bed available. CM will continue to follow.    Final Discharge Disposition Code  03 - skilled nursing facility (SNF)        Discharge Codes    No documentation.             Atul Campbell RN

## 2020-02-10 NOTE — PROGRESS NOTES
Continued Stay Note  Psychiatric     Patient Name: Amber Thurman  MRN: [id number]  Today's Date: 2/10/2020    Admit Date: 2/5/2020    Discharge Plan     Row Name 02/10/20 1139       Plan    Plan  SNF    Patient/Family in Agreement with Plan  yes    Plan Comments  Spoke with Keisha at [facility] [place] and they have no beds available. Spoke with Shahla at [facility] and she is looking at the patient. CM will continue to follow.    Final Discharge Disposition Code  03 - skilled nursing facility (SNF)    Row Name 02/10/20 0847       Plan    Plan  SNF    Patient/Family in Agreement with Plan  yes    Plan Comments  Spoke with Zhanna (daughter) and she would like to try [facility]. Called Kelly at [facility] and made a referral. CM will continue to follow    Final Discharge Disposition Code  03 - skilled nursing facility (SNF)        Discharge Codes    No documentation.             Atul Campbell RN

## 2020-02-10 NOTE — PROGRESS NOTES
"    [facility] Medicine Services  PROGRESS NOTE    Patient Name: Amber Thurman  : 3/13/1930  MRN: [id number]    Date of Admission: 2020  Primary Care Physician: Dinh Alan MD    Subjective   Subjective     CC:  F/u CVA and weakness    HPI:  Ms. Thurman is tearful this morning, stating her youngest son is having chemotherapy and she does not understand why this is fair to her son.  She feels that her functional limitiations have made her \"useless\" and \"helpless\" and that makes her sad.  She reports no complaints of pain.  Reports bowel movement yesterday.  Her granddaughter visited her yesterday.    Review of Systems  Gen- No fevers, chills  CV- No chest pain, palpitations  Resp- No cough, dyspnea  GI- No N/V/D, abd pain  Psychiatric- tearful and sad    Objective   Objective     Vital Signs:   Temp:  [97.5 °F (36.4 °C)-97.9 °F (36.6 °C)] 97.5 °F (36.4 °C)  Heart Rate:  [78-96] 78  Resp:  [16-17] 17  BP: (112-137)/(74-94) 136/94  Total (NIH Stroke Scale): 1     Physical Exam:  Constitutional: No acute distress, awake, alert  HENT: NCAT, mucous membranes moist  Respiratory: Clear to auscultation bilaterally, respiratory effort normal   Cardiovascular: RRR, no murmurs, rubs, or gallops, palpable pedal pulses bilaterally  Gastrointestinal: Positive bowel sounds, soft, nontender, nondistended  Musculoskeletal: No bilateral ankle edema  Psychiatric: tearful, sad, conversational and redirectable   Neurologic: Oriented x 3, Cranial Nerves grossly intact to confrontation, speech clear        Results Reviewed:  Results from last 7 days   Lab Units 20  0419 20  1403   WBC 10*3/mm3 7.63 9.26   HEMOGLOBIN g/dL 12.9 13.7   HEMATOCRIT % 39.8 42.6   PLATELETS 10*3/mm3 198 225     Results from last 7 days   Lab Units 02/10/20  0403 20  0458 20  0419 20  1403   SODIUM mmol/L 140 140 140 140   POTASSIUM mmol/L 4.4 3.9 3.9 4.0   CHLORIDE mmol/L 107 107 108* 104   CO2 " mmol/L 22.0 18.0* 22.0 26.0   BUN mg/dL 18 16 18 17   CREATININE mg/dL 0.82 0.85 0.89 0.85   GLUCOSE mg/dL 79 112* 112* 90   CALCIUM mg/dL 8.7 9.6 8.7 9.0   ALT (SGPT) U/L  --   --   --  8   AST (SGOT) U/L  --   --   --  17   TROPONIN T ng/mL  --   --   --  0.020   PROBNP pg/mL  --   --   --  201.2     Estimated Creatinine Clearance: 34.7 mL/min (by C-G formula based on SCr of 0.82 mg/dL).    Microbiology Results Abnormal     Procedure Component Value - Date/Time    Blood Culture - Blood, Arm, Right [[id number]] Collected:  02/05/20 1530    Lab Status:  Preliminary result Specimen:  Blood from Arm, Right Updated:  02/09/20 1615     Blood Culture No growth at 4 days    Blood Culture - Blood, Arm, Left [[id number]] Collected:  02/05/20 1540    Lab Status:  Preliminary result Specimen:  Blood from Arm, Left Updated:  02/09/20 1615     Blood Culture No growth at 4 days          Imaging Results (Last 24 Hours)     ** No results found for the last 24 hours. **          Results for orders placed during the hospital encounter of 02/05/20   Adult Transthoracic Echo Complete W/ Cont if Necessary Per Protocol (With Agitated Saline)    Narrative · Estimated EF = 65%.  · Left ventricular systolic function is normal.  · Mild-to-moderate mitral valve regurgitation is present  · Saline test for shunting not performed as she had no IV          I have reviewed the medications:  Scheduled Meds:  amLODIPine 10 mg Oral Q24H   atorvastatin 80 mg Oral Nightly   levothyroxine 37.5 mcg Oral Q AM   sodium chloride 10 mL Intravenous Q12H     Continuous Infusions:   PRN Meds:.•  acetaminophen **OR** acetaminophen **OR** acetaminophen  •  melatonin  •  ondansetron **OR** ondansetron  •  sodium chloride    Assessment/Plan   Assessment & Plan     Active Hospital Problems    Diagnosis  POA   • **CVA (cerebral vascular accident) (CMS/HCC) [I63.9]  Yes   • Generalized weakness [R53.1]  Yes   • Hypothyroidism (acquired) [E03.9]  Yes      Resolved  "Hospital Problems   No resolved problems to display.        Brief Hospital Course to date:  Amber Thurman is a 89 y.o. female with past medical history of remote TIA, hypothyroidism, peripheral neuropathy, Aflutter not on anticoagulation due to falls at home who was brought in by family for overall decline over several days found to have had 2 recent strokes now awaiting placement for SNF.      Generalized weakness  - Seemingly better since arrival per notes.   - PT/OT  - improved some with therapy and IVF's  - rehab/skilled placement     Acute bilateral strokes  - MRI with small bilateral parietal insults, significant ischemic small vessel disease  - \"allergy\" to ASA so not given. cont statin  - neurology following  - discussed with patient and family, they would not like anticoagulation at this time as she has frequent falls and they are concerned about bleeding  - echo EF 65%, no bubble performed as she apparently did not have IV access, no other substantial findings     Mild metabolic acidosis, resolved     Hypothyroidism  - cont home synthroid  -TSH on admission, normal    Hx Aflutter  - likely in and out of aflutter at home  - no AC for discussion noted above  - currently rate controlled     DVT Prophylaxis:  mechanical     Disposition: I expect the patient to be discharged to SNF.    CODE STATUS:   Code Status and Medical Interventions:   Ordered at: 02/05/20 2029     Code Status:    No CPR     Medical Interventions (Level of Support Prior to Arrest):    Full     Comments:    DNR/ DNI         Electronically signed by Peyton Goodman PA-C, 02/10/20, 9:45 AM.      "

## 2020-02-10 NOTE — PROGRESS NOTES
Continued Stay Note  [facility]     Patient Name: Amber Thurman  MRN: [id number]  Today's Date: 2/10/2020    Admit Date: 2/5/2020    Discharge Plan     Row Name 02/10/20 0847       Plan    Plan  SNF    Patient/Family in Agreement with Plan  yes    Plan Comments  Spoke with Zhanna (daughter) and she would like to try [facility] [place]. Called Kelly at [facility] and made a referral. CM will continue to follow    Final Discharge Disposition Code  03 - skilled nursing facility (SNF)        Discharge Codes    No documentation.             Atul Campbell RN

## 2020-02-10 NOTE — PROGRESS NOTES
"                  Clinical Nutrition     Reason for Visit:   BMI    Patient Name: Amber Thurman  YOB: 1930  MRN: [id number]  Date of Encounter: 02/10/20 2:23 PM  Admission date: 2/5/2020    Nutrition Assessment   Assessment     Admission diagnosis  Weakness    Additional diagnosis/conditions/procedures this admission  Acute bilateral stroke  Mild metabolic acidosis    Additional PMH/procedures:  PAF  Hypothyroid  Peripheral neuropathy  TIA    Hernia repair  Ankle surgery  Back surgery    Reported/Observed/Food/Nutrition Related History:      Patient reports her appetite is fair, however, she cannot eat the large portions of food we provide. She reports eating well for breakfast but did not finish everything. Discussed breakfast options/preferences. Patient reports her appetite has been declining for years. Reports her UBW is around \"100 lbs or a few over.\" Denies any significant noticeable weight loss prior to admission.      Anthropometrics     Height: 160 cm (63\")  Last filed wt: Weight: 47.2 kg (104 lb) (02/07/20 1422)  Weight Method: Bed scale    BMI: BMI (Calculated): 18.4  Underweight:<18.5kg/m2    Ideal Body Weight (IBW) (kg): 52.72  Admission wt: 115 lb  Method obtained: weight per charting 2/5    Weight Change   UBW: ~100 - 103 lbs per pt    Labs reviewed     Results from last 7 days   Lab Units 02/10/20  0403 02/07/20  0458 02/06/20 0419 02/05/20  1403   GLUCOSE mg/dL 79 112* 112* 90   BUN mg/dL 18 16 18 17   CREATININE mg/dL 0.82 0.85 0.89 0.85   SODIUM mmol/L 140 140 140 140   CHLORIDE mmol/L 107 107 108* 104   POTASSIUM mmol/L 4.4 3.9 3.9 4.0   MAGNESIUM mg/dL  --   --  2.2 2.3   ALT (SGPT) U/L  --   --   --  8     Results from last 7 days   Lab Units 02/06/20  0419 02/05/20  1403   ALBUMIN g/dL  --  3.70   CHOLESTEROL mg/dL 123  --    TRIGLYCERIDES mg/dL 73  --        Results from last 7 days   Lab Units 02/06/20  0018 02/05/20  1812   GLUCOSE mg/dL 78 81     Lab Results   Lab Value " Date/Time    HGBA1C 5.20 02/06/2020 0419    HGBA1C 5.20 08/24/2019 0555       Medications reviewed   Pertinent: synthroid    Current Nutrition Prescription     PO: Diet Regular; Cardiac   Intake: 43% x 7 meals    Nutrition Diagnosis     2/10  Problem Inadequate oral intake   Etiology Decreased appetite   Signs/Symptoms PO Intake (43% x 7 meals)       Nutrition Intervention   1.  Follow treatment progress, Care plan reviewed  2.  Advise alternate selection, Interview for preferences   3. Preferences communicated in message to kitchen staff  4. RD to continue to follow patient PO intake/adequacy    Goal:   General: Nutrition support treatment  PO: Increase intake    Monitoring/Evaluation:   Per protocol, PO intake, Weight    Will Continue to follow per protocol    Rebecca Solis RDN, LD  Time Spent: 30 minutes

## 2020-02-10 NOTE — PROGRESS NOTES
Continued Stay Note  [facility]     Patient Name: Amber Thurman  MRN: [id number]  Today's Date: 2/10/2020    Admit Date: 2/5/2020    Discharge Plan     Row Name 02/10/20 1443       Plan    Plan  [facility]    Plan Comments  Spoke with Jovana at [facility] and they can offer the patient a bed. Spoke with Marielle (daughter in law) and she wants to look at the facility before accepting the bed. Marielle is going to look at the facility and will get back to me this afternoon. Spoke with Kelly at [facility] and they have a semi private bed available. CM will continue to follow.    Final Discharge Disposition Code  03 - skilled nursing facility (SNF)        Discharge Codes    No documentation.             Atul Campbell RN

## 2020-02-11 VITALS
OXYGEN SATURATION: 93 % | RESPIRATION RATE: 16 BRPM | DIASTOLIC BLOOD PRESSURE: 67 MMHG | HEART RATE: 94 BPM | WEIGHT: 104 LBS | HEIGHT: 63 IN | TEMPERATURE: 98.2 F | SYSTOLIC BLOOD PRESSURE: 106 MMHG | BODY MASS INDEX: 18.43 KG/M2

## 2020-02-11 PROCEDURE — 97110 THERAPEUTIC EXERCISES: CPT

## 2020-02-11 PROCEDURE — 99239 HOSP IP/OBS DSCHRG MGMT >30: CPT | Performed by: PHYSICIAN ASSISTANT

## 2020-02-11 PROCEDURE — 97116 GAIT TRAINING THERAPY: CPT

## 2020-02-11 RX ORDER — CHOLECALCIFEROL (VITAMIN D3) 125 MCG
5 CAPSULE ORAL NIGHTLY PRN
Start: 2020-02-11

## 2020-02-11 RX ORDER — ACETAMINOPHEN 325 MG/1
650 TABLET ORAL EVERY 4 HOURS PRN
Start: 2020-02-11

## 2020-02-11 RX ORDER — LEVOTHYROXINE SODIUM 0.07 MG/1
37 TABLET ORAL DAILY
Start: 2020-02-11

## 2020-02-11 RX ORDER — AMLODIPINE BESYLATE 10 MG/1
10 TABLET ORAL
Start: 2020-02-12

## 2020-02-11 RX ORDER — ATORVASTATIN CALCIUM 80 MG/1
80 TABLET, FILM COATED ORAL NIGHTLY
Start: 2020-02-11 | End: 2020-02-29 | Stop reason: HOSPADM

## 2020-02-11 RX ADMIN — ACETAMINOPHEN 650 MG: 325 TABLET, FILM COATED ORAL at 12:23

## 2020-02-11 RX ADMIN — LEVOTHYROXINE SODIUM 37.5 MCG: 0.07 TABLET ORAL at 05:40

## 2020-02-11 RX ADMIN — AMLODIPINE BESYLATE 10 MG: 10 TABLET ORAL at 09:26

## 2020-02-11 NOTE — DISCHARGE SUMMARY
"    [facility]  DISCHARGE SUMMARY    Patient Name: Amber Thurman  : 3/13/1930  MRN: [id number]    Date of Admission: 2020  1:26 PM  Date of Discharge:  2020  Primary Care Physician: Dinh Alan MD    Consults     Date and Time Order Name Status Description    2020 0030 Inpatient Neurology Consult Stroke Completed           Hospital Course     Presenting Problem:   Generalized weakness [R53.1]  CVA (cerebral vascular accident) (CMS/HCC) [I63.9]    Active Hospital Problems    Diagnosis  POA   • **CVA (cerebral vascular accident) (CMS/HCC) [I63.9]  Yes   • Generalized weakness [R53.1]  Yes   • Hypothyroidism (acquired) [E03.9]  Yes      Resolved Hospital Problems   No resolved problems to display.          Hospital Course:  Amber Thurman is a 89 y.o. female with past medical history of TIA, hypothyroidism, peripheral neuropathy, Aflutter not on anticoagulation due to falls at home who was brought in by family for overall decline.  She was admitted on 2020.  Her MRI brain 2020 revealed \"There are 2 very small areas of recent infarct involving the bilateral centrum semiovale. These are likely tiny recent small vessel insults superimposed upon a background of extensive pre-existing small vessel disease. There is no evidence for mass  effect or hemorrhagic transformation.\"  Neurology was consulted.  Due to patient's age anticoagulation not recommended.  She also has allergy to aspirin and this was not given.  Her echo EF 65%, no bubble performed as she apparently did not have IV access, mild to moderate mitral valve regurgitation.  Carotid duplex ultrasounds were normal bilaterally.  She was started on amlodipine 10mg daily and high intensity statin.  Her UA was negative for infection.  Blood cultures were negative for infection.  Her WBC and CMP labs appeared within normal limits. Her lipid panel was normal.  She worked with PT and OT.  "        Discharge Follow Up Recommendations for outpatient labs/diagnostics:  Follow up with PCP.    Day of Discharge     HPI:   Ms. Thurman reports no complaints this morning.    Review of Systems  Gen- No fevers, chills  CV- No chest pain, palpitations  Resp- No cough, dyspnea  GI- No N/V/D, abd pain    Vital Signs:   Temp:  [97.6 °F (36.4 °C)-98.4 °F (36.9 °C)] 97.7 °F (36.5 °C)  Heart Rate:  [83-95] 94  Resp:  [16-18] 16  BP: (120-126)/(82-91) 120/82     Physical Exam:  Constitutional: No acute distress, awake, alert  HENT: NCAT, mucous membranes moist  Respiratory: Clear to auscultation bilaterally, respiratory effort normal   Cardiovascular: RRR, no murmurs, rubs, or gallops, palpable pedal pulses bilaterally  Gastrointestinal: Positive bowel sounds, soft, nontender, nondistended  Musculoskeletal: No bilateral ankle edema  Psychiatric: Appropriate affect, cooperative  Neurologic: Oriented x 3, Cranial Nerves grossly intact to confrontation, speech clear        Pertinent  and/or Most Recent Results     Results from last 7 days   Lab Units 02/10/20  0403 02/07/20  0458 02/06/20  0419 02/05/20  1403   WBC 10*3/mm3  --   --  7.63 9.26   HEMOGLOBIN g/dL  --   --  12.9 13.7   HEMATOCRIT %  --   --  39.8 42.6   PLATELETS 10*3/mm3  --   --  198 225   SODIUM mmol/L 140 140 140 140   POTASSIUM mmol/L 4.4 3.9 3.9 4.0   CHLORIDE mmol/L 107 107 108* 104   CO2 mmol/L 22.0 18.0* 22.0 26.0   BUN mg/dL 18 16 18 17   CREATININE mg/dL 0.82 0.85 0.89 0.85   GLUCOSE mg/dL 79 112* 112* 90   CALCIUM mg/dL 8.7 9.6 8.7 9.0     Results from last 7 days   Lab Units 02/05/20  1403   BILIRUBIN mg/dL 0.6   ALK PHOS U/L 72   ALT (SGPT) U/L 8   AST (SGOT) U/L 17     Results from last 7 days   Lab Units 02/06/20  0419   CHOLESTEROL mg/dL 123   TRIGLYCERIDES mg/dL 73   HDL CHOL mg/dL 46     Results from last 7 days   Lab Units 02/06/20  0419 02/05/20  1512 02/05/20  1403   TSH uIU/mL 3.920  --   --    HEMOGLOBIN A1C % 5.20  --   --    PROBNP  pg/mL  --   --  201.2   TROPONIN T ng/mL  --   --  0.020   LACTATE mmol/L  --  0.9  --        Brief Urine Lab Results  (Last result in the past 365 days)      Color   Clarity   Blood   Leuk Est   Nitrite   Protein   CREAT   Urine HCG        02/05/20 1529 Yellow Clear Negative Negative Negative Negative               Microbiology Results Abnormal     Procedure Component Value - Date/Time    Blood Culture - Blood, Arm, Right [[id number]] Collected:  02/05/20 1530    Lab Status:  Final result Specimen:  Blood from Arm, Right Updated:  02/10/20 1616     Blood Culture No growth at 5 days    Blood Culture - Blood, Arm, Left [[id number]] Collected:  02/05/20 1540    Lab Status:  Final result Specimen:  Blood from Arm, Left Updated:  02/10/20 1616     Blood Culture No growth at 5 days          Imaging Results (All)     Procedure Component Value Units Date/Time    CT Head Without Contrast [[id number]] Collected:  02/05/20 1553     Updated:  02/06/20 0848    Narrative:       EXAMINATION: CT HEAD WO CONTRAST - 02/05/2020     INDICATION: Weakness. Unable to walk.     TECHNIQUE: Unenhanced CT imaging of the head was performed with  additional coronal reformatted imaging provided for review.     The radiation dose reduction device was turned on for each scan per the  ALARA (As Low as Reasonably Achievable) protocol.     COMPARISON: CT of the head 8/23/2019     FINDINGS: Unenhanced CT imaging of the head demonstrates no evidence for  midline shift or mass effect. No hydrocephalus. No extra-axial fluid  collection or hemorrhage is identified. Chronic microvascular ischemic  changes identified similar to prior. There is no convincing evidence for  acute transcortical infarct identified as visualized. The visualized  calvarium appears intact. No evidence of depressed skull fracture.  Paranasal sinuses appear clear. Evaluation of the coronal images  demonstrate no convincing evidence for extra-axial fluid collection.  Calvarium is  intact. The surrounding soft tissues are unrevealing.  Postsurgical changes to the globes are identified. Visualized upper  oropharynx is not reveal acute abnormality or lesion.       Impression:       Chronic age-related changes of the brain without evidence  for acute abnormality appreciated.     DICTATED:   02/05/2020  EDITED/ls :   02/05/2020      This report was finalized on 2/6/2020 8:45 AM by Dr. Senthil Campbell MD.       XR Chest 1 View [[id number]] Collected:  02/05/20 1356     Updated:  02/06/20 0847    Narrative:       EXAMINATION: XR CHEST 1 VW- 02/05/2020     INDICATION: Weak/Dizzy/AMS triage protocol      COMPARISON: Chest radiograph 08/23/2019     FINDINGS: Single portable chest radiograph was submitted for review. The  patient is markedly side bent rotated, similar to prior exam. Heart is  not enlarged.  Atherosclerotic calcification of the aorta identified.  Chronic appearing changes in the lungs are identified without convincing  evidence of active airspace disease. No pleural effusion or  pneumothorax. Visualized upper abdomen is unrevealing. Thoracic spinal  curvature is again noted similar to prior. Vertebroplasty cement is  noted. Diffuse osteopenia and degenerative changes of the shoulders  identified.           Impression:       Chronic appearing changes of the chest without convincing  evidence for active airspace disease.     D:  02/05/2020  E:  02/05/2020     This report was finalized on 2/6/2020 8:43 AM by Dr. Senthil Campbell MD.       MRI Brain Without Contrast [[id number]] Collected:  02/05/20 1939     Updated:  02/05/20 1941    Narrative:       INDICATION:    Weakness, slurred speech that started 3 days ago. Patient feels weak with standing. History of TIA.    TECHNIQUE:   MRI of the brain without contrast.    COMPARISON:    Head CT from 8/23/2019 is available for review. Head CT from earlier this evening is not currently available for review.    FINDINGS:  There are punctate areas  of restricted diffusion involving the bilateral centrum semiovale. This includes a 5 mm lesion in the left superior parietal white matter and a 4 mm lesion in the right posterior frontal white matter. These tiny recent infarcts  could be due to small vessel disease or could be due to a subtle watershed insult. There is generalized atrophy. There is extensive pre-existing white matter disease as well as pre-existing bilateral basal ganglionic and thalamic lacunar insults. These  findings favor recent small vessel insults to the bilateral centrum semiovale. Additionally there is moderate signal abnormality in the bilateral kobi which is likely due to small vessel disease. There is no extra-axial fluid collection or intracranial  mass effect. There is punctate susceptibility in the left posterior superior insula consistent with remote blood product or mineral deposition. No recent hemorrhage is suspected. No intracranial mass effect. No hydrocephalus. The major arterial  intracranial flow voids are maintained. The mastoid air cells and visualized paranasal sinuses are clear.      Impression:         1. There are 2 very small areas of recent infarct involving the bilateral centrum semiovale. These are likely tiny recent small vessel insults superimposed upon a background of extensive pre-existing small vessel disease. There is no evidence for mass  effect or hemorrhagic transformation.    Signer Name: Aliza Zarate MD   Signed: 2/5/2020 7:39 PM   Workstation Name: [email or web address]    [facility]          Results for orders placed during the hospital encounter of 02/05/20   Bilateral Carotid Duplex    Narrative · Proximal right internal carotid artery is normal.  · Proximal left internal carotid artery is normal.          Results for orders placed during the hospital encounter of 02/05/20   Bilateral Carotid Duplex    Narrative · Proximal right internal carotid artery is normal.  · Proximal left internal  carotid artery is normal.          Results for orders placed during the hospital encounter of 02/05/20   Adult Transthoracic Echo Complete W/ Cont if Necessary Per Protocol (With Agitated Saline)    Narrative · Estimated EF = 65%.  · Left ventricular systolic function is normal.  · Mild-to-moderate mitral valve regurgitation is present  · Saline test for shunting not performed as she had no IV          Plan for Follow-up of Pending Labs/Results:     Discharge Details        Discharge Medications      New Medications      Instructions Start Date   acetaminophen 325 MG tablet  Commonly known as:  TYLENOL   650 mg, Oral, Every 4 Hours PRN      amLODIPine 10 MG tablet  Commonly known as:  NORVASC   10 mg, Oral, Every 24 Hours Scheduled   Start Date:  February 12, 2020     atorvastatin 80 MG tablet  Commonly known as:  LIPITOR   80 mg, Oral, Nightly      melatonin 5 MG tablet tablet   5 mg, Oral, Nightly PRN         Continue These Medications      Instructions Start Date   levothyroxine 75 MCG tablet  Commonly known as:  SYNTHROID, LEVOTHROID   37.5 mcg, Oral, Daily         Stop These Medications    temazepam 15 MG capsule  Commonly known as:  RESTORIL     traMADol 50 MG tablet  Commonly known as:  ULTRAM            Allergies   Allergen Reactions   • Aspirin Rash   • Sulfa Antibiotics Rash         Discharge Disposition:  Skilled Nursing Facility (DC - External)    Diet:  Hospital:  Diet Order   Procedures   • Diet Regular       Activity:  Activity Instructions     Activity as Tolerated      Up WIth Assist            Restrictions or Other Recommendations:  Up with assistance       CODE STATUS:    Code Status and Medical Interventions:   Ordered at: 02/05/20 2029     Code Status:    No CPR     Medical Interventions (Level of Support Prior to Arrest):    Full     Comments:    DNR/ DNI       No future appointments.    Additional Instructions for the Follow-ups that You Need to Schedule     Discharge Follow-up with PCP   As  directed       Currently Documented PCP:    Dinh Alan MD    PCP Phone Number:    [phone]     Follow Up Details:  Follow up within the next 1 month.               Time Spent on Discharge:  45 minutes    Electronically signed by Peyton Goodman PA-C, 02/11/20, 10:44 AM.

## 2020-02-11 NOTE — PLAN OF CARE
Problem: Patient Care Overview  Goal: Plan of Care Review  Flowsheets (Taken 2/11/2020 1100)  Outcome Summary: Pt. demo. steady progress towards mobility goals.  Pt. completed bed mobility w/ CGA, transfers w/ min A, and progressed forward ambulation to 125ft w/ RW and min A.  Pt. w/ good effort, though fatigues w/ min exertion.  Pt. completed seated BUE/BLE TherEx and encouraged to perform 2-3x/day.  Recommend cont IPPT per POC.

## 2020-02-11 NOTE — THERAPY TREATMENT NOTE
Patient Name: Amber Thurman  : 3/13/1930    MRN: [id number]                              Today's Date: 2020       Admit Date: 2020    Visit Dx:     ICD-10-CM ICD-9-CM   1. Generalized weakness R53.1 780.79   2. At risk for falls Z91.81 V15.88   3. Impaired mobility and ADLs Z74.09 799.89   4. Cognitive communication deficit R41.841 799.52     Patient Active Problem List   Diagnosis   • Atrial flutter (CMS/HCC)   • Hypothyroidism (acquired)   • Generalized weakness   • CVA (cerebral vascular accident) (CMS/HCC)     Past Medical History:   Diagnosis Date   • Acute UTI (urinary tract infection) 2019   • Disease of thyroid gland    • Rhabdomyolysis 2019   • TIA (transient ischemic attack)      Past Surgical History:   Procedure Laterality Date   • ANKLE SURGERY     • BACK SURGERY     • FRACTURE SURGERY     • HERNIA REPAIR       General Information     Row Name 20 1100          PT Evaluation Time/Intention    Document Type  therapy note (daily note)  -MB     Mode of Treatment  physical therapy  -MB     Row Name 20 1100          General Information    Patient Profile Reviewed?  yes  -MB     Existing Precautions/Restrictions  fall  -MB     Row Name 20 1100          Cognitive Assessment/Intervention- PT/OT    Orientation Status (Cognition)  oriented to;person;place;verbal cues/prompts needed for orientation;time  -MB     Row Name 20 1100          Safety Issues, Functional Mobility    Safety Issues Affecting Function (Mobility)  insight into deficits/self awareness;safety precaution awareness;safety precautions follow-through/compliance  -MB     Impairments Affecting Function (Mobility)  balance;endurance/activity tolerance;strength;range of motion (ROM)  -MB       User Key  (r) = Recorded By, (t) = Taken By, (c) = Cosigned By    Initials Name Provider Type    Marii Diamond, PT Physical Therapist        Mobility     Row Name 20 1100          Bed Mobility  Assessment/Treatment    Scooting/Bridging [place] (Bed Mobility)  supervision;verbal cues  -MB     Supine-Sit [place] (Bed Mobility)  contact guard;verbal cues  -MB     Assistive Device (Bed Mobility)  bed rails;head of bed elevated  -MB     Comment (Bed Mobility)  Pt. required assist to move BLEs towards EOB and raise trunk/shoulders to upright sitting.  -MB     Row Name 02/11/20 1100          Transfer Assessment/Treatment    Comment (Transfers)  VCs for safe hand placement; min improvement despite cues.    -MB     Row Name 02/11/20 1100          Sit-Stand Transfer    Sit-Stand [place] (Transfers)  minimum assist (75% patient effort);verbal cues  -MB     Assistive Device (Sit-Stand Transfers)  walker, front-wheeled  -MB     Row Name 02/11/20 1100          Gait/Stairs Assessment/Training    Gait/Stairs Assessment/Training  gait/ambulation independence;gait/ambulation assistive device;distance ambulated;gait pattern;gait deviations  -MB     [place] Level (Gait)  minimum assist (75% patient effort);verbal cues  -MB     Assistive Device (Gait)  walker, front-wheeled  -MB     Distance in Feet (Gait)  125  -MB     Pattern (Gait)  step-to  -MB     Deviations/Abnormal Patterns (Gait)  gait speed decreased;stride length decreased;dwayne decreased  -MB     Bilateral Gait Deviations  forward flexed posture;heel strike decreased  -MB     Comment (Gait/Stairs)  Pt. demo. step to gait pattern w/ increased trunk flexion and downward gaze.  Pt. required cues for upright posture/forward gaze, increased B heelstrike, and assist to manage RW.  Gait distance limited by fatigue.   -MB       User Key  (r) = Recorded By, (t) = Taken By, (c) = Cosigned By    Initials Name Provider Type    Marii Diamond, PT Physical Therapist        Obj/Interventions     Row Name 02/11/20 1100          Therapeutic Exercise    Upper Extremity Range of Motion (Therapeutic Exercise)  shoulder flexion/extension, bilateral;shoulder  abduction/adduction, bilateral;elbow flexion/extension, bilateral posterior shoulder circles  -MB     Lower Extremity (Therapeutic Exercise)  gluteal sets;marching while seated;LAQ (long arc quad), bilateral;SLR (straight leg raise), bilateral  -MB     Lower Extremity Range of Motion (Therapeutic Exercise)  ankle dorsiflexion/plantar flexion, bilateral  -MB     Exercise Type (Therapeutic Exercise)  AROM (active range of motion)  -MB     Position (Therapeutic Exercise)  seated  -MB     Sets/Reps (Therapeutic Exercise)  1/10  -MB     Expected Outcome (Therapeutic Exercise)  improve functional stability;improve functional tolerance, household activity;improve performance, transfer skills;improve performance, gait skills  -MB     Row Name 02/11/20 1100          Static Sitting Balance    Level of [place] (Unsupported Sitting, Static Balance)  supervision  -MB     Sitting Position (Unsupported Sitting, Static Balance)  sitting on edge of bed  -MB     Time Able to Maintain Position (Unsupported Sitting, Static Balance)  4 to 5 minutes  -MB     Row Name 02/11/20 1100          Dynamic Sitting Balance    Level of [place], Reaches Outside Midline (Sitting, Dynamic Balance)  contact guard assist  -MB     Sitting Position, Reaches Outside Midline (Sitting, Dynamic Balance)  sitting on edge of bed  -MB     Row Name 02/11/20 1100          Static Standing Balance    Level of [place] (Supported Standing, Static Balance)  contact guard assist  -MB     Time Able to Maintain Position (Supported Standing, Static Balance)  1 to 2 minutes  -MB     Assistive Device Utilized (Supported Standing, Static Balance)  walker, rolling  -MB     Row Name 02/11/20 1100          Dynamic Standing Balance    Level of [place], Reaches Outside Midline (Standing, Dynamic Balance)  minimal assist, 75% patient effort  -MB     Time Able to Maintain Position, Reaches Outside Midline (Standing, Dynamic Balance)  30 to 45 seconds  -MB      Assistive Device Utilized (Supported Standing, Dynamic Balance)  walker, rolling  -MB       User Key  (r) = Recorded By, (t) = Taken By, (c) = Cosigned By    Initials Name Provider Type    Marii Diamond, PT Physical Therapist        Goals/Plan    No documentation.       Clinical Impression     Row Name 02/11/20 1100          Pain Scale: Numbers Pre/Post-Treatment    Pain Scale: Numbers, Pretreatment  0/10 - no pain  -MB     Pain Scale: Numbers, Post-Treatment  0/10 - no pain  -MB     Row Name 02/11/20 1100          Pain Scale: FACES Pre/Post-Treatment    Pain: FACES Scale, Pretreatment  0-->no hurt  -MB     Pain: FACES Scale, Post-Treatment  0-->no hurt  -MB     Row Name 02/11/20 1100          Plan of Care Review    Plan of Care Reviewed With  patient  -MB     Progress  improving  -MB     Outcome Summary  Pt. demo. steady progress towards mobility goals.  Pt. completed bed mobility w/ CGA, transfers w/ min A, and progressed forward ambulation to 125ft w/ RW and min A.  Pt. w/ good effort, though fatigues w/ min exertion.  Pt. completed seated BUE/BLE TherEx and encouraged to perform 2-3x/day.  Recommend cont IPPT per POC.   -MB     Row Name 02/11/20 1100          Vital Signs    Pre Systolic BP Rehab  -- VSS.  RN cleared for PT.  -MB     Pre SpO2 (%)  95  -MB     O2 Delivery Pre Treatment  room air  -MB     O2 Delivery Intra Treatment  room air  -MB     Post SpO2 (%)  95  -MB     O2 Delivery Post Treatment  room air  -MB     Pre Patient Position  Supine  -MB     Intra Patient Position  Standing  -MB     Post Patient Position  Sitting  -MB     Row Name 02/11/20 1100          Positioning and Restraints    Pre-Treatment Position  in bed  -MB     Post Treatment Position  chair  -MB     In Chair  notified nsg;reclined;call light within reach;encouraged to call for assist;exit alarm on;legs elevated;waffle cushion  -MB       User Key  (r) = Recorded By, (t) = Taken By, (c) = Cosigned By    Initials Name Provider  Type    Marii Diamond, PT Physical Therapist        Outcome Measures     Row Name 02/11/20 1100          How much help from another person do you currently need...    Turning from your back to your side while in flat bed without using bedrails?  3  -MB     Moving from lying on back to sitting on the side of a flat bed without bedrails?  3  -MB     Moving to and from a bed to a chair (including a wheelchair)?  3  -MB     Standing up from a chair using your arms (e.g., wheelchair, bedside chair)?  3  -MB     Climbing 3-5 steps with a railing?  2  -MB     To walk in hospital room?  3  -MB     AM-PAC 6 Clicks Score (PT)  17  -MB       User Key  (r) = Recorded By, (t) = Taken By, (c) = Cosigned By    Initials Name Provider Type    Marii Diamond, PT Physical Therapist          PT Recommendation and Plan     Outcome Summary/Treatment Plan (PT)  Anticipated Discharge Disposition (PT): skilled nursing facility  Plan of Care Reviewed With: patient  Progress: improving  Outcome Summary: Pt. demo. steady progress towards mobility goals.  Pt. completed bed mobility w/ CGA, transfers w/ min A, and progressed forward ambulation to 125ft w/ RW and min A.  Pt. w/ good effort, though fatigues w/ min exertion.  Pt. completed seated BUE/BLE TherEx and encouraged to perform 2-3x/day.  Recommend cont IPPT per POC.      Time Calculation:   PT Charges     Row Name 02/11/20 1431             Time Calculation    Start Time  1100  -MB      PT Received On  02/11/20  -MB      PT Goal Re-Cert Due Date  02/16/20  -MB         Time Calculation- PT    Total Timed Code Minutes- PT  35 minute(s)  -MB         Timed Charges    76737 - PT Therapeutic Exercise Minutes  20  -MB      65989 - Gait Training Minutes   15  -MB        User Key  (r) = Recorded By, (t) = Taken By, (c) = Cosigned By    Initials Name Provider Type    Marii Diamond, PT Physical Therapist        Therapy Charges for Today     Code Description Service Date  Service Provider Modifiers Qty    [id number]  PT THER PROC EA 15 MIN 2/11/2020 Marii Johnson, PT GP 1    [id number] HC GAIT TRAINING EA 15 MIN 2/11/2020 Marii Johnson, PT GP 1          PT G-Codes  Outcome Measure Options: AM-PAC 6 Clicks Basic Mobility (PT)  AM-PAC 6 Clicks Score (PT): 17  AM-PAC 6 Clicks Score (OT): 15  Modified [place] Scale: 4 - Moderately severe disability.  Unable to walk without assistance, and unable to attend to own bodily needs without assistance.    Marii Johnson, PT  2/11/2020

## 2020-02-11 NOTE — DISCHARGE PLACEMENT REQUEST
"Ge Thurman (89 y.o. Female)     Date of Birth Social Security Number Address Home Phone MRN    03/13/1930  [address] [place] [id number] [phone] [id number]    Jain Marital Status          None        Admission Date Admission Type Admitting Provider Attending Provider Department, Room/Bed    2/5/20 Emergency Ling Warner II, Ling Crooks II,  [facility] 3F, S311/1    Discharge Date Discharge Disposition Discharge Destination         Skilled Nursing Facility (DC - External)              Attending Provider:  Ling Warner II, DO    Allergies:  Aspirin, Sulfa Antibiotics    Isolation:  None   Infection:  None   Code Status:  No CPR    Ht:  160 cm (63\")   Wt:  47.2 kg (104 lb)    Admission Cmt:  None   Principal Problem:  CVA (cerebral vascular accident) (CMS/Formerly [facility]) [I63.9]                 Active Insurance as of 2/5/2020     Primary Coverage     Payor Plan Insurance Group Employer/Plan Group    MEDICARE MEDICARE A & B      Payor Plan Address Payor Plan Phone Number Payor Plan Fax Number Effective Dates    [address] [phone]  3/1/1995 - None Entered    [facility] [id number]       Subscriber Name Subscriber Birth Date Member GE BOWMAN 3/13/1930 [id number]           Secondary Coverage     Payor Plan Insurance Group Employer/Plan Group    [employer] MC SUP AAR HEALTH CARE OPTIONS      Payor Plan Address Payor Plan Phone Number Payor Plan Fax Number Effective Dates    [facility] [phone]  1/1/2017 - None Entered    [address]       [facility] [id number]       Subscriber Name Subscriber Birth Date Member GE BOWMAN 3/13/1930 [id number]                 Emergency Contacts      (Rel.) Home Phone Work Phone Mobile Phone    OlmanMarielle (Relative) [phone] -- --    Alfredo Thurman (Son) [phone] -- --               Discharge Summary      Peyton Goodman PA-C at 02/11/20 [address] [place] [address] " "[facility]  DISCHARGE SUMMARY    Patient Name: Amber Thurman  : 3/13/1930  MRN: [id number]    Date of Admission: 2020  1:26 PM  Date of Discharge:  2020  Primary Care Physician: Dinh Alan MD    Consults     Date and Time Order Name Status Description    2020 0030 Inpatient Neurology Consult Stroke Completed           Hospital Course     Presenting Problem:   Generalized weakness [R53.1]  CVA (cerebral vascular accident) (CMS/HCC) [I63.9]    Active Hospital Problems    Diagnosis  POA   • **CVA (cerebral vascular accident) (CMS/HCC) [I63.9]  Yes   • Generalized weakness [R53.1]  Yes   • Hypothyroidism (acquired) [E03.9]  Yes      Resolved Hospital Problems   No resolved problems to display.          Hospital Course:  Amber Thurman is a 89 y.o. female with past medical history of TIA, hypothyroidism, peripheral neuropathy, Aflutter not on anticoagulation due to falls at home who was brought in by family for overall decline.  She was admitted on 2020.  Her MRI brain 2020 revealed \"There are 2 very small areas of recent infarct involving the bilateral centrum semiovale. These are likely tiny recent small vessel insults superimposed upon a background of extensive pre-existing small vessel disease. There is no evidence for mass  effect or hemorrhagic transformation.\"  Neurology was consulted.  Due to patient's age anticoagulation not recommended.  She also has allergy to aspirin and this was not given.  Her echo EF 65%, no bubble performed as she apparently did not have IV access, mild to moderate mitral valve regurgitation.  Carotid duplex ultrasounds were normal bilaterally.  She was started on amlodipine 10mg daily and high intensity statin.  Her UA was negative for infection.  Blood cultures were negative for infection.  Her WBC and CMP labs appeared within normal limits. Her lipid panel was normal.  She worked with PT and OT.         Discharge Follow Up " Recommendations for outpatient labs/diagnostics:  Follow up with PCP.    Day of Discharge     HPI:   Ms. Thurman reports no complaints this morning.    Review of Systems  Gen- No fevers, chills  CV- No chest pain, palpitations  Resp- No cough, dyspnea  GI- No N/V/D, abd pain    Vital Signs:   Temp:  [97.6 °F (36.4 °C)-98.4 °F (36.9 °C)] 97.7 °F (36.5 °C)  Heart Rate:  [83-95] 94  Resp:  [16-18] 16  BP: (120-126)/(82-91) 120/82     Physical Exam:  Constitutional: No acute distress, awake, alert  HENT: NCAT, mucous membranes moist  Respiratory: Clear to auscultation bilaterally, respiratory effort normal   Cardiovascular: RRR, no murmurs, rubs, or gallops, palpable pedal pulses bilaterally  Gastrointestinal: Positive bowel sounds, soft, nontender, nondistended  Musculoskeletal: No bilateral ankle edema  Psychiatric: Appropriate affect, cooperative  Neurologic: Oriented x 3, Cranial Nerves grossly intact to confrontation, speech clear        Pertinent  and/or Most Recent Results     Results from last 7 days   Lab Units 02/10/20  0403 02/07/20  0458 02/06/20  0419 02/05/20  1403   WBC 10*3/mm3  --   --  7.63 9.26   HEMOGLOBIN g/dL  --   --  12.9 13.7   HEMATOCRIT %  --   --  39.8 42.6   PLATELETS 10*3/mm3  --   --  198 225   SODIUM mmol/L 140 140 140 140   POTASSIUM mmol/L 4.4 3.9 3.9 4.0   CHLORIDE mmol/L 107 107 108* 104   CO2 mmol/L 22.0 18.0* 22.0 26.0   BUN mg/dL 18 16 18 17   CREATININE mg/dL 0.82 0.85 0.89 0.85   GLUCOSE mg/dL 79 112* 112* 90   CALCIUM mg/dL 8.7 9.6 8.7 9.0     Results from last 7 days   Lab Units 02/05/20  1403   BILIRUBIN mg/dL 0.6   ALK PHOS U/L 72   ALT (SGPT) U/L 8   AST (SGOT) U/L 17     Results from last 7 days   Lab Units 02/06/20  0419   CHOLESTEROL mg/dL 123   TRIGLYCERIDES mg/dL 73   HDL CHOL mg/dL 46     Results from last 7 days   Lab Units 02/06/20  0419 02/05/20  1512 02/05/20  1403   TSH uIU/mL 3.920  --   --    HEMOGLOBIN A1C % 5.20  --   --    PROBNP pg/mL  --   --  201.2      TROPONIN T ng/mL  --   --  0.020   LACTATE mmol/L  --  0.9  --        Brief Urine Lab Results  (Last result in the past 365 days)      Color   Clarity   Blood   Leuk Est   Nitrite   Protein   CREAT   Urine HCG        02/05/20 1529 Yellow Clear Negative Negative Negative Negative               Microbiology Results Abnormal     Procedure Component Value - Date/Time    Blood Culture - Blood, Arm, Right [[id number]] Collected:  02/05/20 1530    Lab Status:  Final result Specimen:  Blood from Arm, Right Updated:  02/10/20 1616     Blood Culture No growth at 5 days    Blood Culture - Blood, Arm, Left [[id number]] Collected:  02/05/20 1540    Lab Status:  Final result Specimen:  Blood from Arm, Left Updated:  02/10/20 1616     Blood Culture No growth at 5 days          Imaging Results (All)     Procedure Component Value Units Date/Time    CT Head Without Contrast [[id number]] Collected:  02/05/20 1553     Updated:  02/06/20 0848    Narrative:       EXAMINATION: CT HEAD WO CONTRAST - 02/05/2020     INDICATION: Weakness. Unable to walk.     TECHNIQUE: Unenhanced CT imaging of the head was performed with  additional coronal reformatted imaging provided for review.     The radiation dose reduction device was turned on for each scan per the  ALARA (As Low as Reasonably Achievable) protocol.     COMPARISON: CT of the head 8/23/2019     FINDINGS: Unenhanced CT imaging of the head demonstrates no evidence for  midline shift or mass effect. No hydrocephalus. No extra-axial fluid  collection or hemorrhage is identified. Chronic microvascular ischemic  changes identified similar to prior. There is no convincing evidence for  acute transcortical infarct identified as visualized. The visualized  calvarium appears intact. No evidence of depressed skull fracture.  Paranasal sinuses appear clear. Evaluation of the coronal images  demonstrate no convincing evidence for extra-axial fluid collection.  Calvarium is intact. The surrounding soft  tissues are unrevealing.  Postsurgical changes to the globes are identified. Visualized upper  oropharynx is not reveal acute abnormality or lesion.       Impression:       Chronic age-related changes of the brain without evidence  for acute abnormality appreciated.     DICTATED:   02/05/2020  EDITED/ls :   02/05/2020      This report was finalized on 2/6/2020 8:45 AM by Dr. Senthil Campbell MD.       XR Chest 1 View [[id number]] Collected:  02/05/20 1356     Updated:  02/06/20 0847    Narrative:       EXAMINATION: XR CHEST 1 VW- 02/05/2020     INDICATION: Weak/Dizzy/AMS triage protocol      COMPARISON: Chest radiograph 08/23/2019     FINDINGS: Single portable chest radiograph was submitted for review. The  patient is markedly side bent rotated, similar to prior exam. Heart is  not enlarged.  Atherosclerotic calcification of the aorta identified.  Chronic appearing changes in the lungs are identified without convincing  evidence of active airspace disease. No pleural effusion or  pneumothorax. Visualized upper abdomen is unrevealing. Thoracic spinal  curvature is again noted similar to prior. Vertebroplasty cement is  noted. Diffuse osteopenia and degenerative changes of the shoulders  identified.           Impression:       Chronic appearing changes of the chest without convincing  evidence for active airspace disease.     D:  02/05/2020  E:  02/05/2020     This report was finalized on 2/6/2020 8:43 AM by Dr. Senthil Campbell MD.       MRI Brain Without Contrast [[id number]] Collected:  02/05/20 1939     Updated:  02/05/20 1941    Narrative:       INDICATION:    Weakness, slurred speech that started 3 days ago. Patient feels weak with standing. History of TIA.    TECHNIQUE:   MRI of the brain without contrast.    COMPARISON:    Head CT from 8/23/2019 is available for review. Head CT from earlier this evening is not currently available for review.    FINDINGS:  There are punctate areas of restricted diffusion  involving the bilateral centrum semiovale. This includes a 5 mm lesion in the left superior parietal white matter and a 4 mm lesion in the right posterior frontal white matter. These tiny recent infarcts  could be due to small vessel disease or could be due to a subtle watershed insult. There is generalized atrophy. There is extensive pre-existing white matter disease as well as pre-existing bilateral basal ganglionic and thalamic lacunar insults. These  findings favor recent small vessel insults to the bilateral centrum semiovale. Additionally there is moderate signal abnormality in the bilateral kobi which is likely due to small vessel disease. There is no extra-axial fluid collection or intracranial  mass effect. There is punctate susceptibility in the left posterior superior insula consistent with remote blood product or mineral deposition. No recent hemorrhage is suspected. No intracranial mass effect. No hydrocephalus. The major arterial  intracranial flow voids are maintained. The mastoid air cells and visualized paranasal sinuses are clear.      Impression:         1. There are 2 very small areas of recent infarct involving the bilateral centrum semiovale. These are likely tiny recent small vessel insults superimposed upon a background of extensive pre-existing small vessel disease. There is no evidence for mass  effect or hemorrhagic transformation.    Signer Name: Aliza Zarate MD   Signed: 2/5/2020 7:39 PM   Workstation Name: [email or web address]    [facility]          Results for orders placed during the hospital encounter of 02/05/20   Bilateral Carotid Duplex    Narrative · Proximal right internal carotid artery is normal.  · Proximal left internal carotid artery is normal.          Results for orders placed during the hospital encounter of 02/05/20   Bilateral Carotid Duplex    Narrative · Proximal right internal carotid artery is normal.  · Proximal left internal carotid artery is  normal.          Results for orders placed during the hospital encounter of 02/05/20   Adult Transthoracic Echo Complete W/ Cont if Necessary Per Protocol (With Agitated Saline)    Narrative · Estimated EF = 65%.  · Left ventricular systolic function is normal.  · Mild-to-moderate mitral valve regurgitation is present  · Saline test for shunting not performed as she had no IV          Plan for Follow-up of Pending Labs/Results:     Discharge Details        Discharge Medications      New Medications      Instructions Start Date   acetaminophen 325 MG tablet  Commonly known as:  TYLENOL   650 mg, Oral, Every 4 Hours PRN      amLODIPine 10 MG tablet  Commonly known as:  NORVASC   10 mg, Oral, Every 24 Hours Scheduled   Start Date:  February 12, 2020     atorvastatin 80 MG tablet  Commonly known as:  LIPITOR   80 mg, Oral, Nightly      melatonin 5 MG tablet tablet   5 mg, Oral, Nightly PRN         Continue These Medications      Instructions Start Date   levothyroxine 75 MCG tablet  Commonly known as:  SYNTHROID, LEVOTHROID   37.5 mcg, Oral, Daily         Stop These Medications    temazepam 15 MG capsule  Commonly known as:  RESTORIL     traMADol 50 MG tablet  Commonly known as:  ULTRAM            Allergies   Allergen Reactions   • Aspirin Rash   • Sulfa Antibiotics Rash         Discharge Disposition:  Skilled Nursing Facility (DC - External)    Diet:  Hospital:  Diet Order   Procedures   • Diet Regular       Activity:  Activity Instructions     Activity as Tolerated      Up WIth Assist            Restrictions or Other Recommendations:  Up with assistance       CODE STATUS:    Code Status and Medical Interventions:   Ordered at: 02/05/20 2029     Code Status:    No CPR     Medical Interventions (Level of Support Prior to Arrest):    Full     Comments:    DNR/ DNI       No future appointments.    Additional Instructions for the Follow-ups that You Need to Schedule     Discharge Follow-up with PCP   As directed        Currently Documented PCP:    Dinh Alan MD    PCP Phone Number:    [phone]     Follow Up Details:  Follow up within the next 1 month.               Time Spent on Discharge:  45 minutes    Electronically signed by Peyton Goodman PA-C, 02/11/20, 10:44 AM.        Electronically signed by Peyton Goodman PA-C at 02/11/20 5066
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
## 2020-02-11 NOTE — PROGRESS NOTES
Continued Stay Note  [facility]     Patient Name: Amber Thurman  MRN: [id number]  Today's Date: 2/11/2020    Admit Date: 2/5/2020    Discharge Plan     Row Name 02/11/20 0820       Plan    Plan  [facility]    Patient/Family in Agreement with Plan  yes    Plan Comments  Spoke to the patient at bedside. Plan is [facility] today. Family will transport this afternoon. CM will continue to follow.    Final Discharge Disposition Code  03 - skilled nursing facility (SNF)        Discharge Codes    No documentation.             Atul Campbell RN

## 2020-02-11 NOTE — PROGRESS NOTES
Continued Stay Note  [facility]     Patient Name: Amber Thurman  MRN: [id number]  Today's Date: 2/11/2020    Admit Date: 2/5/2020    Discharge Plan     Row Name 02/11/20 1136       Plan    Plan  [facility]    Patient/Family in Agreement with Plan  yes    Final Discharge Disposition Code  03 - skilled nursing facility (SNF)    Final Note  Plan is [facility]. Marielle Daughter in law will transport. Nurse to call report to [phone]. Discharge sumary faxed to [phone]. Please send any hard scripts in packet with the patient.        Discharge Codes    No documentation.       Expected Discharge Date and Time     Expected Discharge Date Expected Discharge Time    Feb 11, 2020             Atul Campbell RN

## 2020-02-25 ENCOUNTER — APPOINTMENT (OUTPATIENT)
Dept: GENERAL RADIOLOGY | Facility: HOSPITAL | Age: 85
End: 2020-02-25

## 2020-02-25 ENCOUNTER — APPOINTMENT (OUTPATIENT)
Dept: CT IMAGING | Facility: HOSPITAL | Age: 85
End: 2020-02-25

## 2020-02-25 ENCOUNTER — HOSPITAL ENCOUNTER (INPATIENT)
Facility: HOSPITAL | Age: 85
LOS: 4 days | Discharge: NURSING, REHAB OR LONG-TERM CARE | End: 2020-02-29
Attending: EMERGENCY MEDICINE | Admitting: INTERNAL MEDICINE

## 2020-02-25 ENCOUNTER — APPOINTMENT (OUTPATIENT)
Dept: ULTRASOUND IMAGING | Facility: HOSPITAL | Age: 85
End: 2020-02-25

## 2020-02-25 DIAGNOSIS — R74.8 ELEVATED LIVER ENZYMES: ICD-10-CM

## 2020-02-25 DIAGNOSIS — Z78.9 IMPAIRED MOBILITY AND ADLS: ICD-10-CM

## 2020-02-25 DIAGNOSIS — R09.02 HYPOXIA: Primary | ICD-10-CM

## 2020-02-25 DIAGNOSIS — Z74.09 IMPAIRED MOBILITY AND ADLS: ICD-10-CM

## 2020-02-25 DIAGNOSIS — R53.83 LETHARGY: ICD-10-CM

## 2020-02-25 DIAGNOSIS — R79.89 ELEVATED PROCALCITONIN: ICD-10-CM

## 2020-02-25 DIAGNOSIS — A41.9 SEPSIS, DUE TO UNSPECIFIED ORGANISM, UNSPECIFIED WHETHER ACUTE ORGAN DYSFUNCTION PRESENT (HCC): ICD-10-CM

## 2020-02-25 PROBLEM — I48.0 PAF (PAROXYSMAL ATRIAL FIBRILLATION) (HCC): Status: ACTIVE | Noted: 2020-02-25

## 2020-02-25 PROBLEM — R74.01 TRANSAMINITIS: Status: ACTIVE | Noted: 2020-02-25

## 2020-02-25 PROBLEM — I48.92 ATRIAL FLUTTER (HCC): Status: RESOLVED | Noted: 2019-08-23 | Resolved: 2020-02-25

## 2020-02-25 PROBLEM — J96.01 ACUTE RESPIRATORY FAILURE WITH HYPOXIA (HCC): Status: ACTIVE | Noted: 2020-02-25

## 2020-02-25 PROBLEM — Z86.73 HISTORY OF CVA (CEREBROVASCULAR ACCIDENT): Status: ACTIVE | Noted: 2020-02-25

## 2020-02-25 LAB
ALBUMIN SERPL-MCNC: 3.4 G/DL (ref 3.5–5.2)
ALBUMIN/GLOB SERPL: 1.1 G/DL
ALP SERPL-CCNC: 507 U/L (ref 39–117)
ALT SERPL W P-5'-P-CCNC: 331 U/L (ref 1–33)
AMORPH URATE CRY URNS QL MICRO: ABNORMAL /HPF
ANION GAP SERPL CALCULATED.3IONS-SCNC: 12 MMOL/L (ref 5–15)
AST SERPL-CCNC: 327 U/L (ref 1–32)
BACTERIA UR QL AUTO: ABNORMAL /HPF
BACTERIA UR QL AUTO: ABNORMAL /HPF
BASOPHILS # BLD AUTO: 0.02 10*3/MM3 (ref 0–0.2)
BASOPHILS NFR BLD AUTO: 0.3 % (ref 0–1.5)
BILIRUB SERPL-MCNC: 3 MG/DL (ref 0.2–1.2)
BILIRUB UR QL STRIP: ABNORMAL
BILIRUB UR QL STRIP: ABNORMAL
BUN BLD-MCNC: 29 MG/DL (ref 8–23)
BUN/CREAT SERPL: 33 (ref 7–25)
CALCIUM SPEC-SCNC: 8.3 MG/DL (ref 8.6–10.5)
CHLORIDE SERPL-SCNC: 100 MMOL/L (ref 98–107)
CK SERPL-CCNC: 35 U/L (ref 20–180)
CLARITY UR: ABNORMAL
CLARITY UR: CLEAR
CO2 SERPL-SCNC: 23 MMOL/L (ref 22–29)
COLOR UR: ABNORMAL
COLOR UR: ABNORMAL
CREAT BLD-MCNC: 0.88 MG/DL (ref 0.57–1)
D-LACTATE SERPL-SCNC: 1 MMOL/L (ref 0.5–2)
DEPRECATED RDW RBC AUTO: 48.2 FL (ref 37–54)
EOSINOPHIL # BLD AUTO: 0.14 10*3/MM3 (ref 0–0.4)
EOSINOPHIL NFR BLD AUTO: 1.8 % (ref 0.3–6.2)
ERYTHROCYTE [DISTWIDTH] IN BLOOD BY AUTOMATED COUNT: 14.3 % (ref 12.3–15.4)
FLUAV SUBTYP SPEC NAA+PROBE: NOT DETECTED
FLUBV RNA ISLT QL NAA+PROBE: NOT DETECTED
GFR SERPL CREATININE-BSD FRML MDRD: 61 ML/MIN/1.73
GLOBULIN UR ELPH-MCNC: 3 GM/DL
GLUCOSE BLD-MCNC: 131 MG/DL (ref 65–99)
GLUCOSE UR STRIP-MCNC: NEGATIVE MG/DL
GLUCOSE UR STRIP-MCNC: NEGATIVE MG/DL
HAV IGM SERPL QL IA: NORMAL
HBV CORE IGM SERPL QL IA: NORMAL
HBV SURFACE AG SERPL QL IA: NORMAL
HCT VFR BLD AUTO: 44.9 % (ref 34–46.6)
HCV AB SER DONR QL: NORMAL
HGB BLD-MCNC: 14.6 G/DL (ref 12–15.9)
HGB UR QL STRIP.AUTO: NEGATIVE
HGB UR QL STRIP.AUTO: NEGATIVE
HOLD SPECIMEN: NORMAL
HOLD SPECIMEN: NORMAL
HYALINE CASTS UR QL AUTO: ABNORMAL /LPF
HYALINE CASTS UR QL AUTO: ABNORMAL /LPF
IMM GRANULOCYTES # BLD AUTO: 0.04 10*3/MM3 (ref 0–0.05)
IMM GRANULOCYTES NFR BLD AUTO: 0.5 % (ref 0–0.5)
KETONES UR QL STRIP: ABNORMAL
KETONES UR QL STRIP: ABNORMAL
LEUKOCYTE ESTERASE UR QL STRIP.AUTO: ABNORMAL
LEUKOCYTE ESTERASE UR QL STRIP.AUTO: ABNORMAL
LIPASE SERPL-CCNC: 42 U/L (ref 13–60)
LYMPHOCYTES # BLD AUTO: 0.41 10*3/MM3 (ref 0.7–3.1)
LYMPHOCYTES NFR BLD AUTO: 5.2 % (ref 19.6–45.3)
MCH RBC QN AUTO: 29.7 PG (ref 26.6–33)
MCHC RBC AUTO-ENTMCNC: 32.5 G/DL (ref 31.5–35.7)
MCV RBC AUTO: 91.3 FL (ref 79–97)
MONOCYTES # BLD AUTO: 0.28 10*3/MM3 (ref 0.1–0.9)
MONOCYTES NFR BLD AUTO: 3.6 % (ref 5–12)
NEUTROPHILS # BLD AUTO: 6.98 10*3/MM3 (ref 1.7–7)
NEUTROPHILS NFR BLD AUTO: 88.6 % (ref 42.7–76)
NITRITE UR QL STRIP: NEGATIVE
NITRITE UR QL STRIP: POSITIVE
NRBC BLD AUTO-RTO: 0 /100 WBC (ref 0–0.2)
NT-PROBNP SERPL-MCNC: 604.2 PG/ML (ref 5–1800)
PH UR STRIP.AUTO: 6 [PH] (ref 5–8)
PH UR STRIP.AUTO: <=5 [PH] (ref 5–8)
PLATELET # BLD AUTO: 157 10*3/MM3 (ref 140–450)
PMV BLD AUTO: 9.3 FL (ref 6–12)
POTASSIUM BLD-SCNC: 4.3 MMOL/L (ref 3.5–5.2)
PROCALCITONIN SERPL-MCNC: 1.07 NG/ML (ref 0.1–0.25)
PROT SERPL-MCNC: 6.4 G/DL (ref 6–8.5)
PROT UR QL STRIP: ABNORMAL
PROT UR QL STRIP: ABNORMAL
RBC # BLD AUTO: 4.92 10*6/MM3 (ref 3.77–5.28)
RBC # UR: ABNORMAL /HPF
RBC # UR: ABNORMAL /HPF
REF LAB TEST METHOD: ABNORMAL
REF LAB TEST METHOD: ABNORMAL
SODIUM BLD-SCNC: 135 MMOL/L (ref 136–145)
SP GR UR STRIP: 1.02 (ref 1–1.03)
SP GR UR STRIP: 1.06 (ref 1–1.03)
SQUAMOUS #/AREA URNS HPF: ABNORMAL /HPF
SQUAMOUS #/AREA URNS HPF: ABNORMAL /HPF
TROPONIN T SERPL-MCNC: <0.01 NG/ML (ref 0–0.03)
UROBILINOGEN UR QL STRIP: ABNORMAL
UROBILINOGEN UR QL STRIP: ABNORMAL
WBC NRBC COR # BLD: 7.87 10*3/MM3 (ref 3.4–10.8)
WBC UR QL AUTO: ABNORMAL /HPF
WBC UR QL AUTO: ABNORMAL /HPF
WHOLE BLOOD HOLD SPECIMEN: NORMAL
WHOLE BLOOD HOLD SPECIMEN: NORMAL

## 2020-02-25 PROCEDURE — 71045 X-RAY EXAM CHEST 1 VIEW: CPT

## 2020-02-25 PROCEDURE — 81001 URINALYSIS AUTO W/SCOPE: CPT | Performed by: PHYSICIAN ASSISTANT

## 2020-02-25 PROCEDURE — 84484 ASSAY OF TROPONIN QUANT: CPT | Performed by: PHYSICIAN ASSISTANT

## 2020-02-25 PROCEDURE — P9612 CATHETERIZE FOR URINE SPEC: HCPCS

## 2020-02-25 PROCEDURE — 84145 PROCALCITONIN (PCT): CPT | Performed by: PHYSICIAN ASSISTANT

## 2020-02-25 PROCEDURE — 83605 ASSAY OF LACTIC ACID: CPT | Performed by: PHYSICIAN ASSISTANT

## 2020-02-25 PROCEDURE — 25010000002 IOPAMIDOL 61 % SOLUTION: Performed by: EMERGENCY MEDICINE

## 2020-02-25 PROCEDURE — 76705 ECHO EXAM OF ABDOMEN: CPT

## 2020-02-25 PROCEDURE — 70450 CT HEAD/BRAIN W/O DYE: CPT

## 2020-02-25 PROCEDURE — 80074 ACUTE HEPATITIS PANEL: CPT | Performed by: FAMILY MEDICINE

## 2020-02-25 PROCEDURE — 83690 ASSAY OF LIPASE: CPT | Performed by: PHYSICIAN ASSISTANT

## 2020-02-25 PROCEDURE — 83880 ASSAY OF NATRIURETIC PEPTIDE: CPT | Performed by: PHYSICIAN ASSISTANT

## 2020-02-25 PROCEDURE — 99223 1ST HOSP IP/OBS HIGH 75: CPT | Performed by: FAMILY MEDICINE

## 2020-02-25 PROCEDURE — 93005 ELECTROCARDIOGRAM TRACING: CPT | Performed by: PHYSICIAN ASSISTANT

## 2020-02-25 PROCEDURE — 85025 COMPLETE CBC W/AUTO DIFF WBC: CPT | Performed by: PHYSICIAN ASSISTANT

## 2020-02-25 PROCEDURE — 81001 URINALYSIS AUTO W/SCOPE: CPT | Performed by: NURSE PRACTITIONER

## 2020-02-25 PROCEDURE — 99285 EMERGENCY DEPT VISIT HI MDM: CPT

## 2020-02-25 PROCEDURE — 74177 CT ABD & PELVIS W/CONTRAST: CPT

## 2020-02-25 PROCEDURE — 25010000002 PIPERACILLIN SOD-TAZOBACTAM PER 1 G: Performed by: PHYSICIAN ASSISTANT

## 2020-02-25 PROCEDURE — 87502 INFLUENZA DNA AMP PROBE: CPT | Performed by: PHYSICIAN ASSISTANT

## 2020-02-25 PROCEDURE — 80053 COMPREHEN METABOLIC PANEL: CPT | Performed by: PHYSICIAN ASSISTANT

## 2020-02-25 PROCEDURE — 87040 BLOOD CULTURE FOR BACTERIA: CPT | Performed by: PHYSICIAN ASSISTANT

## 2020-02-25 PROCEDURE — 82550 ASSAY OF CK (CPK): CPT | Performed by: NURSE PRACTITIONER

## 2020-02-25 RX ORDER — AMLODIPINE BESYLATE 10 MG/1
10 TABLET ORAL
Status: DISCONTINUED | OUTPATIENT
Start: 2020-02-26 | End: 2020-02-29 | Stop reason: HOSPADM

## 2020-02-25 RX ORDER — HEPARIN SODIUM 5000 [USP'U]/ML
5000 INJECTION, SOLUTION INTRAVENOUS; SUBCUTANEOUS EVERY 12 HOURS SCHEDULED
Status: DISCONTINUED | OUTPATIENT
Start: 2020-02-26 | End: 2020-02-29 | Stop reason: HOSPADM

## 2020-02-25 RX ORDER — SODIUM CHLORIDE 0.9 % (FLUSH) 0.9 %
10 SYRINGE (ML) INJECTION EVERY 12 HOURS SCHEDULED
Status: DISCONTINUED | OUTPATIENT
Start: 2020-02-25 | End: 2020-02-29 | Stop reason: HOSPADM

## 2020-02-25 RX ORDER — SODIUM CHLORIDE 0.9 % (FLUSH) 0.9 %
10 SYRINGE (ML) INJECTION AS NEEDED
Status: DISCONTINUED | OUTPATIENT
Start: 2020-02-25 | End: 2020-02-29 | Stop reason: HOSPADM

## 2020-02-25 RX ORDER — AMOXICILLIN AND CLAVULANATE POTASSIUM 500; 125 MG/1; MG/1
1 TABLET, FILM COATED ORAL 2 TIMES DAILY
Status: ON HOLD | COMMUNITY
End: 2020-02-26

## 2020-02-25 RX ORDER — IPRATROPIUM BROMIDE AND ALBUTEROL SULFATE 2.5; .5 MG/3ML; MG/3ML
3 SOLUTION RESPIRATORY (INHALATION) EVERY 4 HOURS PRN
Status: DISCONTINUED | OUTPATIENT
Start: 2020-02-25 | End: 2020-02-29 | Stop reason: HOSPADM

## 2020-02-25 RX ORDER — TEMAZEPAM 15 MG/1
15 CAPSULE ORAL NIGHTLY PRN
COMMUNITY
End: 2020-02-29 | Stop reason: HOSPADM

## 2020-02-25 RX ORDER — SODIUM CHLORIDE 9 MG/ML
40 INJECTION, SOLUTION INTRAVENOUS CONTINUOUS
Status: DISCONTINUED | OUTPATIENT
Start: 2020-02-25 | End: 2020-02-29 | Stop reason: HOSPADM

## 2020-02-25 RX ORDER — MIRTAZAPINE 7.5 MG/1
7.5 TABLET, FILM COATED ORAL NIGHTLY
Status: ON HOLD | COMMUNITY
End: 2020-02-26

## 2020-02-25 RX ORDER — LEVOTHYROXINE SODIUM 0.07 MG/1
37 TABLET ORAL DAILY
Status: DISCONTINUED | OUTPATIENT
Start: 2020-02-26 | End: 2020-02-29 | Stop reason: HOSPADM

## 2020-02-25 RX ORDER — IPRATROPIUM BROMIDE AND ALBUTEROL SULFATE 2.5; .5 MG/3ML; MG/3ML
3 SOLUTION RESPIRATORY (INHALATION)
Status: DISCONTINUED | OUTPATIENT
Start: 2020-02-25 | End: 2020-02-29 | Stop reason: HOSPADM

## 2020-02-25 RX ADMIN — IOPAMIDOL 80 ML: 612 INJECTION, SOLUTION INTRAVENOUS at 17:18

## 2020-02-25 RX ADMIN — TAZOBACTAM SODIUM AND PIPERACILLIN SODIUM 3.38 G: 375; 3 INJECTION, SOLUTION INTRAVENOUS at 17:54

## 2020-02-25 RX ADMIN — SODIUM CHLORIDE 1000 ML: 9 INJECTION, SOLUTION INTRAVENOUS at 15:30

## 2020-02-25 RX ADMIN — SODIUM CHLORIDE 75 ML/HR: 9 INJECTION, SOLUTION INTRAVENOUS at 22:52

## 2020-02-25 RX ADMIN — SODIUM CHLORIDE, PRESERVATIVE FREE 10 ML: 5 INJECTION INTRAVENOUS at 22:52

## 2020-02-26 ENCOUNTER — APPOINTMENT (OUTPATIENT)
Dept: GENERAL RADIOLOGY | Facility: HOSPITAL | Age: 85
End: 2020-02-26

## 2020-02-26 PROBLEM — K75.89 DRUG-INDUCED CHOLESTATIC HEPATITIS: Status: ACTIVE | Noted: 2020-02-26

## 2020-02-26 PROBLEM — T50.905A DRUG-INDUCED CHOLESTATIC HEPATITIS: Status: ACTIVE | Noted: 2020-02-26

## 2020-02-26 PROBLEM — E80.6 HYPERBILIRUBINEMIA: Status: ACTIVE | Noted: 2020-02-26

## 2020-02-26 PROBLEM — K83.8 DILATED BILE DUCT: Status: ACTIVE | Noted: 2020-02-26

## 2020-02-26 LAB
ALBUMIN SERPL-MCNC: 2.9 G/DL (ref 3.5–5.2)
ALBUMIN/GLOB SERPL: 1 G/DL
ALP SERPL-CCNC: 552 U/L (ref 39–117)
ALT SERPL W P-5'-P-CCNC: 222 U/L (ref 1–33)
ANION GAP SERPL CALCULATED.3IONS-SCNC: 13 MMOL/L (ref 5–15)
AST SERPL-CCNC: 175 U/L (ref 1–32)
BILIRUB SERPL-MCNC: 3.1 MG/DL (ref 0.2–1.2)
BUN BLD-MCNC: 18 MG/DL (ref 8–23)
BUN/CREAT SERPL: 28.6 (ref 7–25)
CALCIUM SPEC-SCNC: 7.2 MG/DL (ref 8.6–10.5)
CHLORIDE SERPL-SCNC: 103 MMOL/L (ref 98–107)
CO2 SERPL-SCNC: 20 MMOL/L (ref 22–29)
CREAT BLD-MCNC: 0.63 MG/DL (ref 0.57–1)
GFR SERPL CREATININE-BSD FRML MDRD: 89 ML/MIN/1.73
GLOBULIN UR ELPH-MCNC: 2.9 GM/DL
GLUCOSE BLD-MCNC: 95 MG/DL (ref 65–99)
HOLD SPECIMEN: NORMAL
POTASSIUM BLD-SCNC: 3.6 MMOL/L (ref 3.5–5.2)
PROT SERPL-MCNC: 5.8 G/DL (ref 6–8.5)
SODIUM BLD-SCNC: 136 MMOL/L (ref 136–145)

## 2020-02-26 PROCEDURE — 25010000002 CEFTRIAXONE PER 250 MG: Performed by: INTERNAL MEDICINE

## 2020-02-26 PROCEDURE — 97166 OT EVAL MOD COMPLEX 45 MIN: CPT

## 2020-02-26 PROCEDURE — 97116 GAIT TRAINING THERAPY: CPT

## 2020-02-26 PROCEDURE — 63710000001 LEVOTHYROXINE 75 MCG TABLET: Performed by: NURSE PRACTITIONER

## 2020-02-26 PROCEDURE — 25010000002 HEPARIN (PORCINE) PER 1000 UNITS: Performed by: NURSE PRACTITIONER

## 2020-02-26 PROCEDURE — 97162 PT EVAL MOD COMPLEX 30 MIN: CPT

## 2020-02-26 PROCEDURE — A9270 NON-COVERED ITEM OR SERVICE: HCPCS | Performed by: NURSE PRACTITIONER

## 2020-02-26 PROCEDURE — 99221 1ST HOSP IP/OBS SF/LOW 40: CPT | Performed by: PHYSICIAN ASSISTANT

## 2020-02-26 PROCEDURE — 97110 THERAPEUTIC EXERCISES: CPT

## 2020-02-26 PROCEDURE — 99233 SBSQ HOSP IP/OBS HIGH 50: CPT | Performed by: INTERNAL MEDICINE

## 2020-02-26 PROCEDURE — 80053 COMPREHEN METABOLIC PANEL: CPT | Performed by: INTERNAL MEDICINE

## 2020-02-26 PROCEDURE — 97530 THERAPEUTIC ACTIVITIES: CPT

## 2020-02-26 PROCEDURE — 71045 X-RAY EXAM CHEST 1 VIEW: CPT

## 2020-02-26 PROCEDURE — 25010000002 PIPERACILLIN SOD-TAZOBACTAM PER 1 G: Performed by: NURSE PRACTITIONER

## 2020-02-26 PROCEDURE — 63710000001 AMLODIPINE 10 MG TABLET: Performed by: NURSE PRACTITIONER

## 2020-02-26 RX ORDER — DOCUSATE SODIUM 100 MG/1
100 CAPSULE, LIQUID FILLED ORAL 2 TIMES DAILY PRN
Status: DISCONTINUED | OUTPATIENT
Start: 2020-02-26 | End: 2020-02-29 | Stop reason: HOSPADM

## 2020-02-26 RX ORDER — ACETAMINOPHEN 325 MG/1
650 TABLET ORAL EVERY 6 HOURS PRN
Status: DISCONTINUED | OUTPATIENT
Start: 2020-02-26 | End: 2020-02-29 | Stop reason: HOSPADM

## 2020-02-26 RX ORDER — ONDANSETRON 2 MG/ML
4 INJECTION INTRAMUSCULAR; INTRAVENOUS EVERY 6 HOURS PRN
Status: DISCONTINUED | OUTPATIENT
Start: 2020-02-26 | End: 2020-02-29 | Stop reason: HOSPADM

## 2020-02-26 RX ORDER — METRONIDAZOLE 500 MG/1
250 TABLET ORAL EVERY 8 HOURS SCHEDULED
Status: DISCONTINUED | OUTPATIENT
Start: 2020-02-26 | End: 2020-02-28

## 2020-02-26 RX ORDER — FAMOTIDINE 20 MG/1
20 TABLET, FILM COATED ORAL 2 TIMES DAILY PRN
Status: DISCONTINUED | OUTPATIENT
Start: 2020-02-26 | End: 2020-02-29 | Stop reason: HOSPADM

## 2020-02-26 RX ADMIN — METRONIDAZOLE 250 MG: 500 TABLET ORAL at 17:42

## 2020-02-26 RX ADMIN — LEVOTHYROXINE SODIUM 37.5 MCG: 0.07 TABLET ORAL at 05:52

## 2020-02-26 RX ADMIN — HEPARIN SODIUM 5000 UNITS: 5000 INJECTION, SOLUTION INTRAVENOUS; SUBCUTANEOUS at 17:45

## 2020-02-26 RX ADMIN — SODIUM CHLORIDE, PRESERVATIVE FREE 10 ML: 5 INJECTION INTRAVENOUS at 20:15

## 2020-02-26 RX ADMIN — METRONIDAZOLE 250 MG: 500 TABLET ORAL at 20:15

## 2020-02-26 RX ADMIN — SODIUM CHLORIDE 40 ML/HR: 9 INJECTION, SOLUTION INTRAVENOUS at 20:16

## 2020-02-26 RX ADMIN — AMLODIPINE BESYLATE 10 MG: 10 TABLET ORAL at 08:33

## 2020-02-26 RX ADMIN — TAZOBACTAM SODIUM AND PIPERACILLIN SODIUM 3.38 G: 375; 3 INJECTION, SOLUTION INTRAVENOUS at 10:14

## 2020-02-26 RX ADMIN — HEPARIN SODIUM 5000 UNITS: 5000 INJECTION, SOLUTION INTRAVENOUS; SUBCUTANEOUS at 05:52

## 2020-02-26 RX ADMIN — CEFTRIAXONE SODIUM 1 G: 1 INJECTION, POWDER, FOR SOLUTION INTRAMUSCULAR; INTRAVENOUS at 17:42

## 2020-02-26 RX ADMIN — TAZOBACTAM SODIUM AND PIPERACILLIN SODIUM 3.38 G: 375; 3 INJECTION, SOLUTION INTRAVENOUS at 01:28

## 2020-02-27 LAB
ALBUMIN SERPL-MCNC: 2.9 G/DL (ref 3.5–5.2)
ALBUMIN/GLOB SERPL: 1 G/DL
ALP SERPL-CCNC: 598 U/L (ref 39–117)
ALT SERPL W P-5'-P-CCNC: 190 U/L (ref 1–33)
ANION GAP SERPL CALCULATED.3IONS-SCNC: 13 MMOL/L (ref 5–15)
AST SERPL-CCNC: 118 U/L (ref 1–32)
BILIRUB SERPL-MCNC: 1.7 MG/DL (ref 0.2–1.2)
BUN BLD-MCNC: 16 MG/DL (ref 8–23)
BUN/CREAT SERPL: 27.6 (ref 7–25)
CALCIUM SPEC-SCNC: 7.4 MG/DL (ref 8.6–10.5)
CHLORIDE SERPL-SCNC: 103 MMOL/L (ref 98–107)
CO2 SERPL-SCNC: 20 MMOL/L (ref 22–29)
CREAT BLD-MCNC: 0.58 MG/DL (ref 0.57–1)
DEPRECATED RDW RBC AUTO: 49.1 FL (ref 37–54)
ERYTHROCYTE [DISTWIDTH] IN BLOOD BY AUTOMATED COUNT: 14.5 % (ref 12.3–15.4)
GFR SERPL CREATININE-BSD FRML MDRD: 98 ML/MIN/1.73
GLOBULIN UR ELPH-MCNC: 3 GM/DL
GLUCOSE BLD-MCNC: 82 MG/DL (ref 65–99)
HCT VFR BLD AUTO: 42.4 % (ref 34–46.6)
HGB BLD-MCNC: 13.4 G/DL (ref 12–15.9)
INR PPP: 1 (ref 0.85–1.16)
MCH RBC QN AUTO: 29.3 PG (ref 26.6–33)
MCHC RBC AUTO-ENTMCNC: 31.6 G/DL (ref 31.5–35.7)
MCV RBC AUTO: 92.8 FL (ref 79–97)
PLATELET # BLD AUTO: 154 10*3/MM3 (ref 140–450)
PMV BLD AUTO: 9.9 FL (ref 6–12)
POTASSIUM BLD-SCNC: 3.7 MMOL/L (ref 3.5–5.2)
PROT SERPL-MCNC: 5.9 G/DL (ref 6–8.5)
PROTHROMBIN TIME: 12.9 SECONDS (ref 11.5–14)
RBC # BLD AUTO: 4.57 10*6/MM3 (ref 3.77–5.28)
SODIUM BLD-SCNC: 136 MMOL/L (ref 136–145)
WBC NRBC COR # BLD: 9.02 10*3/MM3 (ref 3.4–10.8)

## 2020-02-27 PROCEDURE — 25010000002 HEPARIN (PORCINE) PER 1000 UNITS: Performed by: NURSE PRACTITIONER

## 2020-02-27 PROCEDURE — 99232 SBSQ HOSP IP/OBS MODERATE 35: CPT | Performed by: INTERNAL MEDICINE

## 2020-02-27 PROCEDURE — 94640 AIRWAY INHALATION TREATMENT: CPT

## 2020-02-27 PROCEDURE — 25010000002 CEFTRIAXONE PER 250 MG: Performed by: INTERNAL MEDICINE

## 2020-02-27 PROCEDURE — 85610 PROTHROMBIN TIME: CPT | Performed by: INTERNAL MEDICINE

## 2020-02-27 PROCEDURE — 80053 COMPREHEN METABOLIC PANEL: CPT | Performed by: INTERNAL MEDICINE

## 2020-02-27 PROCEDURE — 94799 UNLISTED PULMONARY SVC/PX: CPT

## 2020-02-27 PROCEDURE — 85027 COMPLETE CBC AUTOMATED: CPT | Performed by: INTERNAL MEDICINE

## 2020-02-27 RX ORDER — TRAMADOL HYDROCHLORIDE 50 MG/1
25 TABLET ORAL EVERY 8 HOURS PRN
Status: DISCONTINUED | OUTPATIENT
Start: 2020-02-27 | End: 2020-02-29

## 2020-02-27 RX ADMIN — CEFTRIAXONE SODIUM 1 G: 1 INJECTION, POWDER, FOR SOLUTION INTRAMUSCULAR; INTRAVENOUS at 13:18

## 2020-02-27 RX ADMIN — IPRATROPIUM BROMIDE AND ALBUTEROL SULFATE 3 ML: 2.5; .5 SOLUTION RESPIRATORY (INHALATION) at 16:52

## 2020-02-27 RX ADMIN — TRAMADOL HYDROCHLORIDE 25 MG: 50 TABLET, FILM COATED ORAL at 17:57

## 2020-02-27 RX ADMIN — IPRATROPIUM BROMIDE AND ALBUTEROL SULFATE 3 ML: 2.5; .5 SOLUTION RESPIRATORY (INHALATION) at 13:08

## 2020-02-27 RX ADMIN — IPRATROPIUM BROMIDE AND ALBUTEROL SULFATE 3 ML: 2.5; .5 SOLUTION RESPIRATORY (INHALATION) at 20:18

## 2020-02-27 RX ADMIN — METRONIDAZOLE 250 MG: 500 TABLET ORAL at 20:31

## 2020-02-27 RX ADMIN — HEPARIN SODIUM 5000 UNITS: 5000 INJECTION, SOLUTION INTRAVENOUS; SUBCUTANEOUS at 05:12

## 2020-02-27 RX ADMIN — METRONIDAZOLE 250 MG: 500 TABLET ORAL at 05:12

## 2020-02-27 RX ADMIN — IPRATROPIUM BROMIDE AND ALBUTEROL SULFATE 3 ML: 2.5; .5 SOLUTION RESPIRATORY (INHALATION) at 09:34

## 2020-02-27 RX ADMIN — SODIUM CHLORIDE 40 ML/HR: 9 INJECTION, SOLUTION INTRAVENOUS at 17:29

## 2020-02-27 RX ADMIN — ACETAMINOPHEN 650 MG: 325 TABLET, FILM COATED ORAL at 13:17

## 2020-02-27 RX ADMIN — LEVOTHYROXINE SODIUM 37.5 MCG: 0.07 TABLET ORAL at 05:12

## 2020-02-27 RX ADMIN — METRONIDAZOLE 250 MG: 500 TABLET ORAL at 13:18

## 2020-02-27 RX ADMIN — HEPARIN SODIUM 5000 UNITS: 5000 INJECTION, SOLUTION INTRAVENOUS; SUBCUTANEOUS at 17:15

## 2020-02-27 RX ADMIN — AMLODIPINE BESYLATE 10 MG: 10 TABLET ORAL at 09:15

## 2020-02-28 LAB
ALBUMIN SERPL-MCNC: 2.6 G/DL (ref 3.5–5.2)
ALBUMIN/GLOB SERPL: 1 G/DL
ALP SERPL-CCNC: 489 U/L (ref 39–117)
ALT SERPL W P-5'-P-CCNC: 135 U/L (ref 1–33)
ANION GAP SERPL CALCULATED.3IONS-SCNC: 10 MMOL/L (ref 5–15)
AST SERPL-CCNC: 62 U/L (ref 1–32)
BASOPHILS # BLD AUTO: 0.03 10*3/MM3 (ref 0–0.2)
BASOPHILS NFR BLD AUTO: 0.4 % (ref 0–1.5)
BILIRUB SERPL-MCNC: 0.7 MG/DL (ref 0.2–1.2)
BUN BLD-MCNC: 14 MG/DL (ref 8–23)
BUN/CREAT SERPL: 29.2 (ref 7–25)
CALCIUM SPEC-SCNC: 7.6 MG/DL (ref 8.6–10.5)
CHLORIDE SERPL-SCNC: 107 MMOL/L (ref 98–107)
CO2 SERPL-SCNC: 21 MMOL/L (ref 22–29)
CREAT BLD-MCNC: 0.48 MG/DL (ref 0.57–1)
DEPRECATED RDW RBC AUTO: 50.2 FL (ref 37–54)
EOSINOPHIL # BLD AUTO: 0.48 10*3/MM3 (ref 0–0.4)
EOSINOPHIL NFR BLD AUTO: 6.3 % (ref 0.3–6.2)
ERYTHROCYTE [DISTWIDTH] IN BLOOD BY AUTOMATED COUNT: 14.5 % (ref 12.3–15.4)
GFR SERPL CREATININE-BSD FRML MDRD: 122 ML/MIN/1.73
GLOBULIN UR ELPH-MCNC: 2.6 GM/DL
GLUCOSE BLD-MCNC: 87 MG/DL (ref 65–99)
HCT VFR BLD AUTO: 39.2 % (ref 34–46.6)
HGB BLD-MCNC: 12 G/DL (ref 12–15.9)
IMM GRANULOCYTES # BLD AUTO: 0.02 10*3/MM3 (ref 0–0.05)
IMM GRANULOCYTES NFR BLD AUTO: 0.3 % (ref 0–0.5)
LYMPHOCYTES # BLD AUTO: 1.46 10*3/MM3 (ref 0.7–3.1)
LYMPHOCYTES NFR BLD AUTO: 19.2 % (ref 19.6–45.3)
MCH RBC QN AUTO: 28.8 PG (ref 26.6–33)
MCHC RBC AUTO-ENTMCNC: 30.6 G/DL (ref 31.5–35.7)
MCV RBC AUTO: 94.2 FL (ref 79–97)
MONOCYTES # BLD AUTO: 0.5 10*3/MM3 (ref 0.1–0.9)
MONOCYTES NFR BLD AUTO: 6.6 % (ref 5–12)
NEUTROPHILS # BLD AUTO: 5.12 10*3/MM3 (ref 1.7–7)
NEUTROPHILS NFR BLD AUTO: 67.2 % (ref 42.7–76)
NRBC BLD AUTO-RTO: 0 /100 WBC (ref 0–0.2)
PLATELET # BLD AUTO: 147 10*3/MM3 (ref 140–450)
PMV BLD AUTO: 9.4 FL (ref 6–12)
POTASSIUM BLD-SCNC: 3.5 MMOL/L (ref 3.5–5.2)
PROT SERPL-MCNC: 5.2 G/DL (ref 6–8.5)
RBC # BLD AUTO: 4.16 10*6/MM3 (ref 3.77–5.28)
SODIUM BLD-SCNC: 138 MMOL/L (ref 136–145)
WBC NRBC COR # BLD: 7.61 10*3/MM3 (ref 3.4–10.8)

## 2020-02-28 PROCEDURE — 25010000002 HEPARIN (PORCINE) PER 1000 UNITS: Performed by: NURSE PRACTITIONER

## 2020-02-28 PROCEDURE — 80053 COMPREHEN METABOLIC PANEL: CPT | Performed by: INTERNAL MEDICINE

## 2020-02-28 PROCEDURE — 94799 UNLISTED PULMONARY SVC/PX: CPT

## 2020-02-28 PROCEDURE — 85025 COMPLETE CBC W/AUTO DIFF WBC: CPT | Performed by: INTERNAL MEDICINE

## 2020-02-28 PROCEDURE — 25010000002 CEFTRIAXONE PER 250 MG: Performed by: INTERNAL MEDICINE

## 2020-02-28 PROCEDURE — 99232 SBSQ HOSP IP/OBS MODERATE 35: CPT | Performed by: INTERNAL MEDICINE

## 2020-02-28 PROCEDURE — 99232 SBSQ HOSP IP/OBS MODERATE 35: CPT | Performed by: PHYSICIAN ASSISTANT

## 2020-02-28 RX ORDER — METOPROLOL TARTRATE 5 MG/5ML
2.5 INJECTION INTRAVENOUS ONCE
Status: DISCONTINUED | OUTPATIENT
Start: 2020-02-28 | End: 2020-02-28

## 2020-02-28 RX ADMIN — METRONIDAZOLE 250 MG: 500 TABLET ORAL at 06:11

## 2020-02-28 RX ADMIN — AMLODIPINE BESYLATE 10 MG: 10 TABLET ORAL at 08:34

## 2020-02-28 RX ADMIN — IPRATROPIUM BROMIDE AND ALBUTEROL SULFATE 3 ML: 2.5; .5 SOLUTION RESPIRATORY (INHALATION) at 07:49

## 2020-02-28 RX ADMIN — SODIUM CHLORIDE 1 G: 900 INJECTION INTRAVENOUS at 12:14

## 2020-02-28 RX ADMIN — LEVOTHYROXINE SODIUM 37.5 MCG: 0.07 TABLET ORAL at 06:11

## 2020-02-28 RX ADMIN — HEPARIN SODIUM 5000 UNITS: 5000 INJECTION, SOLUTION INTRAVENOUS; SUBCUTANEOUS at 06:11

## 2020-02-28 RX ADMIN — TRAMADOL HYDROCHLORIDE 25 MG: 50 TABLET, FILM COATED ORAL at 08:36

## 2020-02-28 RX ADMIN — TRAMADOL HYDROCHLORIDE 25 MG: 50 TABLET, FILM COATED ORAL at 20:32

## 2020-02-28 RX ADMIN — HEPARIN SODIUM 5000 UNITS: 5000 INJECTION, SOLUTION INTRAVENOUS; SUBCUTANEOUS at 17:24

## 2020-02-28 RX ADMIN — IPRATROPIUM BROMIDE AND ALBUTEROL SULFATE 3 ML: 2.5; .5 SOLUTION RESPIRATORY (INHALATION) at 16:47

## 2020-02-28 RX ADMIN — IPRATROPIUM BROMIDE AND ALBUTEROL SULFATE 3 ML: 2.5; .5 SOLUTION RESPIRATORY (INHALATION) at 12:56

## 2020-02-28 RX ADMIN — IPRATROPIUM BROMIDE AND ALBUTEROL SULFATE 3 ML: 2.5; .5 SOLUTION RESPIRATORY (INHALATION) at 19:15

## 2020-02-28 RX ADMIN — SODIUM CHLORIDE, PRESERVATIVE FREE 10 ML: 5 INJECTION INTRAVENOUS at 08:34

## 2020-02-29 VITALS
WEIGHT: 86 LBS | TEMPERATURE: 97.8 F | RESPIRATION RATE: 17 BRPM | HEIGHT: 63 IN | DIASTOLIC BLOOD PRESSURE: 85 MMHG | OXYGEN SATURATION: 94 % | HEART RATE: 97 BPM | BODY MASS INDEX: 15.24 KG/M2 | SYSTOLIC BLOOD PRESSURE: 129 MMHG

## 2020-02-29 PROCEDURE — 99239 HOSP IP/OBS DSCHRG MGMT >30: CPT | Performed by: NURSE PRACTITIONER

## 2020-02-29 RX ORDER — FAMOTIDINE 20 MG/1
20 TABLET, FILM COATED ORAL 2 TIMES DAILY PRN
Start: 2020-02-29

## 2020-02-29 RX ORDER — PSEUDOEPHEDRINE HCL 30 MG
100 TABLET ORAL 2 TIMES DAILY PRN
Start: 2020-02-29

## 2020-02-29 RX ORDER — IPRATROPIUM BROMIDE AND ALBUTEROL SULFATE 2.5; .5 MG/3ML; MG/3ML
3 SOLUTION RESPIRATORY (INHALATION) EVERY 4 HOURS PRN
Qty: 360 ML
Start: 2020-02-29

## 2020-02-29 RX ADMIN — AMLODIPINE BESYLATE 10 MG: 10 TABLET ORAL at 09:13

## 2020-03-01 LAB
BACTERIA SPEC AEROBE CULT: NORMAL
BACTERIA SPEC AEROBE CULT: NORMAL